# Patient Record
Sex: MALE | Race: WHITE | NOT HISPANIC OR LATINO
[De-identification: names, ages, dates, MRNs, and addresses within clinical notes are randomized per-mention and may not be internally consistent; named-entity substitution may affect disease eponyms.]

---

## 2017-09-07 PROBLEM — Z00.00 ENCOUNTER FOR PREVENTIVE HEALTH EXAMINATION: Status: ACTIVE | Noted: 2017-09-07

## 2017-09-12 ENCOUNTER — APPOINTMENT (OUTPATIENT)
Dept: OTOLARYNGOLOGY | Facility: CLINIC | Age: 62
End: 2017-09-12
Payer: MEDICAID

## 2017-09-12 VITALS — WEIGHT: 185 LBS | HEIGHT: 66 IN | BODY MASS INDEX: 29.73 KG/M2

## 2017-09-12 VITALS — SYSTOLIC BLOOD PRESSURE: 142 MMHG | DIASTOLIC BLOOD PRESSURE: 88 MMHG | HEART RATE: 65 BPM | OXYGEN SATURATION: 96 %

## 2017-09-12 DIAGNOSIS — Z78.9 OTHER SPECIFIED HEALTH STATUS: ICD-10-CM

## 2017-09-12 PROCEDURE — 99204 OFFICE O/P NEW MOD 45 MIN: CPT | Mod: 25

## 2017-09-12 PROCEDURE — 69210 REMOVE IMPACTED EAR WAX UNI: CPT | Mod: RT

## 2017-09-14 ENCOUNTER — MOBILE ON CALL (OUTPATIENT)
Age: 62
End: 2017-09-14

## 2017-09-26 ENCOUNTER — APPOINTMENT (OUTPATIENT)
Dept: OTOLARYNGOLOGY | Facility: CLINIC | Age: 62
End: 2017-09-26
Payer: MEDICAID

## 2017-09-26 VITALS — OXYGEN SATURATION: 96 % | HEART RATE: 81 BPM | SYSTOLIC BLOOD PRESSURE: 135 MMHG | DIASTOLIC BLOOD PRESSURE: 85 MMHG

## 2017-09-26 DIAGNOSIS — H61.21 IMPACTED CERUMEN, RIGHT EAR: ICD-10-CM

## 2017-09-26 PROCEDURE — 99214 OFFICE O/P EST MOD 30 MIN: CPT

## 2017-10-27 ENCOUNTER — OUTPATIENT (OUTPATIENT)
Dept: OUTPATIENT SERVICES | Facility: HOSPITAL | Age: 62
LOS: 1 days | End: 2017-10-27
Payer: MEDICAID

## 2017-10-27 PROCEDURE — 74230 X-RAY XM SWLNG FUNCJ C+: CPT

## 2017-10-27 PROCEDURE — 74230 X-RAY XM SWLNG FUNCJ C+: CPT | Mod: 26

## 2017-12-07 ENCOUNTER — APPOINTMENT (OUTPATIENT)
Dept: GASTROENTEROLOGY | Facility: CLINIC | Age: 62
End: 2017-12-07
Payer: MEDICAID

## 2017-12-07 VITALS
BODY MASS INDEX: 24.21 KG/M2 | RESPIRATION RATE: 14 BRPM | WEIGHT: 150 LBS | SYSTOLIC BLOOD PRESSURE: 132 MMHG | HEART RATE: 71 BPM | DIASTOLIC BLOOD PRESSURE: 85 MMHG | OXYGEN SATURATION: 98 %

## 2017-12-07 DIAGNOSIS — Z86.73 PERSONAL HISTORY OF TRANSIENT ISCHEMIC ATTACK (TIA), AND CEREBRAL INFARCTION W/OUT RESIDUAL DEFICITS: ICD-10-CM

## 2017-12-07 PROCEDURE — 99244 OFF/OP CNSLTJ NEW/EST MOD 40: CPT

## 2018-01-02 ENCOUNTER — APPOINTMENT (OUTPATIENT)
Dept: GASTROENTEROLOGY | Facility: HOSPITAL | Age: 63
End: 2018-01-02
Payer: MEDICAID

## 2018-01-02 ENCOUNTER — OUTPATIENT (OUTPATIENT)
Dept: OUTPATIENT SERVICES | Facility: HOSPITAL | Age: 63
LOS: 1 days | Discharge: HOME | End: 2018-01-02
Payer: MEDICAID

## 2018-01-02 PROCEDURE — 91010 ESOPHAGUS MOTILITY STUDY: CPT | Mod: 26

## 2018-01-02 PROCEDURE — 91010 ESOPHAGUS MOTILITY STUDY: CPT

## 2018-01-04 ENCOUNTER — APPOINTMENT (OUTPATIENT)
Dept: NEUROLOGY | Facility: CLINIC | Age: 63
End: 2018-01-04
Payer: MEDICAID

## 2018-01-04 VITALS
HEART RATE: 69 BPM | SYSTOLIC BLOOD PRESSURE: 146 MMHG | BODY MASS INDEX: 22.66 KG/M2 | HEIGHT: 66 IN | DIASTOLIC BLOOD PRESSURE: 87 MMHG | OXYGEN SATURATION: 100 % | WEIGHT: 141 LBS

## 2018-01-04 DIAGNOSIS — Z87.891 PERSONAL HISTORY OF NICOTINE DEPENDENCE: ICD-10-CM

## 2018-01-04 PROCEDURE — 99205 OFFICE O/P NEW HI 60 MIN: CPT

## 2018-01-04 RX ORDER — OFLOXACIN OTIC 3 MG/ML
0.3 SOLUTION AURICULAR (OTIC)
Qty: 1 | Refills: 0 | Status: DISCONTINUED | COMMUNITY
Start: 2017-09-12 | End: 2018-01-04

## 2018-01-04 RX ORDER — AMLODIPINE BESYLATE 5 MG/1
5 TABLET ORAL DAILY
Refills: 0 | Status: ACTIVE | COMMUNITY

## 2018-01-04 RX ORDER — INSULIN ASPART 100 [IU]/ML
INJECTION, SOLUTION INTRAVENOUS; SUBCUTANEOUS
Refills: 0 | Status: DISCONTINUED | COMMUNITY
End: 2018-01-04

## 2018-01-04 RX ORDER — ATORVASTATIN CALCIUM 80 MG/1
80 TABLET, FILM COATED ORAL DAILY
Refills: 0 | Status: ACTIVE | COMMUNITY

## 2018-01-04 RX ORDER — CIPROFLOXACIN 3 MG/ML
0.3 SOLUTION OPHTHALMIC
Qty: 1 | Refills: 0 | Status: DISCONTINUED | COMMUNITY
Start: 2017-09-14 | End: 2018-01-04

## 2018-01-04 RX ORDER — AMLODIPINE BESYLATE 5 MG/1
TABLET ORAL
Refills: 0 | Status: DISCONTINUED | COMMUNITY
End: 2018-01-04

## 2018-01-08 ENCOUNTER — MOBILE ON CALL (OUTPATIENT)
Age: 63
End: 2018-01-08

## 2018-01-18 ENCOUNTER — APPOINTMENT (OUTPATIENT)
Dept: GASTROENTEROLOGY | Facility: CLINIC | Age: 63
End: 2018-01-18
Payer: MEDICAID

## 2018-01-18 VITALS
HEART RATE: 92 BPM | SYSTOLIC BLOOD PRESSURE: 130 MMHG | BODY MASS INDEX: 23.24 KG/M2 | OXYGEN SATURATION: 99 % | WEIGHT: 144 LBS | TEMPERATURE: 99.2 F | DIASTOLIC BLOOD PRESSURE: 80 MMHG

## 2018-01-18 DIAGNOSIS — Z80.41 FAMILY HISTORY OF MALIGNANT NEOPLASM OF OVARY: ICD-10-CM

## 2018-01-18 DIAGNOSIS — Z86.39 PERSONAL HISTORY OF OTHER ENDOCRINE, NUTRITIONAL AND METABOLIC DISEASE: ICD-10-CM

## 2018-01-18 DIAGNOSIS — Z80.3 FAMILY HISTORY OF MALIGNANT NEOPLASM OF BREAST: ICD-10-CM

## 2018-01-18 PROCEDURE — 99213 OFFICE O/P EST LOW 20 MIN: CPT

## 2018-01-19 ENCOUNTER — INPATIENT (INPATIENT)
Facility: HOSPITAL | Age: 63
LOS: 0 days | Discharge: HOME | DRG: 639 | End: 2018-01-20
Attending: INTERNAL MEDICINE | Admitting: INTERNAL MEDICINE
Payer: MEDICAID

## 2018-01-19 VITALS
RESPIRATION RATE: 20 BRPM | HEART RATE: 96 BPM | OXYGEN SATURATION: 96 % | TEMPERATURE: 101 F | SYSTOLIC BLOOD PRESSURE: 120 MMHG | DIASTOLIC BLOOD PRESSURE: 76 MMHG

## 2018-01-19 DIAGNOSIS — E16.2 HYPOGLYCEMIA, UNSPECIFIED: ICD-10-CM

## 2018-01-19 DIAGNOSIS — Z29.9 ENCOUNTER FOR PROPHYLACTIC MEASURES, UNSPECIFIED: ICD-10-CM

## 2018-01-19 DIAGNOSIS — E11.9 TYPE 2 DIABETES MELLITUS WITHOUT COMPLICATIONS: ICD-10-CM

## 2018-01-19 DIAGNOSIS — E78.5 HYPERLIPIDEMIA, UNSPECIFIED: ICD-10-CM

## 2018-01-19 DIAGNOSIS — K22.0 ACHALASIA OF CARDIA: ICD-10-CM

## 2018-01-19 DIAGNOSIS — A41.9 SEPSIS, UNSPECIFIED ORGANISM: ICD-10-CM

## 2018-01-19 DIAGNOSIS — I63.9 CEREBRAL INFARCTION, UNSPECIFIED: ICD-10-CM

## 2018-01-19 DIAGNOSIS — J10.1 INFLUENZA DUE TO OTHER IDENTIFIED INFLUENZA VIRUS WITH OTHER RESPIRATORY MANIFESTATIONS: ICD-10-CM

## 2018-01-19 DIAGNOSIS — I10 ESSENTIAL (PRIMARY) HYPERTENSION: ICD-10-CM

## 2018-01-19 LAB
ALBUMIN SERPL ELPH-MCNC: 3.9 G/DL — SIGNIFICANT CHANGE UP (ref 3.3–5)
ALP SERPL-CCNC: 53 U/L — SIGNIFICANT CHANGE UP (ref 40–120)
ALT FLD-CCNC: 10 U/L — SIGNIFICANT CHANGE UP (ref 10–45)
ANION GAP SERPL CALC-SCNC: 10 MMOL/L — SIGNIFICANT CHANGE UP (ref 5–17)
APPEARANCE UR: CLEAR — SIGNIFICANT CHANGE UP
AST SERPL-CCNC: 20 U/L — SIGNIFICANT CHANGE UP (ref 10–40)
BASE EXCESS BLDV CALC-SCNC: -9.4 MMOL/L — SIGNIFICANT CHANGE UP
BASOPHILS NFR BLD AUTO: 0.2 % — SIGNIFICANT CHANGE UP (ref 0–2)
BILIRUB SERPL-MCNC: 0.4 MG/DL — SIGNIFICANT CHANGE UP (ref 0.2–1.2)
BILIRUB UR-MCNC: NEGATIVE — SIGNIFICANT CHANGE UP
BUN SERPL-MCNC: 19 MG/DL — SIGNIFICANT CHANGE UP (ref 7–23)
CALCIUM SERPL-MCNC: 8.7 MG/DL — SIGNIFICANT CHANGE UP (ref 8.4–10.5)
CHLORIDE SERPL-SCNC: 98 MMOL/L — SIGNIFICANT CHANGE UP (ref 96–108)
CO2 SERPL-SCNC: 31 MMOL/L — SIGNIFICANT CHANGE UP (ref 22–31)
COLOR SPEC: YELLOW — SIGNIFICANT CHANGE UP
CREAT SERPL-MCNC: 0.63 MG/DL — SIGNIFICANT CHANGE UP (ref 0.5–1.3)
DIFF PNL FLD: NEGATIVE — SIGNIFICANT CHANGE UP
EOSINOPHIL NFR BLD AUTO: 0.1 % — SIGNIFICANT CHANGE UP (ref 0–6)
FLUAV H1 2009 PAND RNA SPEC QL NAA+PROBE: DETECTED
GAS PNL BLDV: SIGNIFICANT CHANGE UP
GLUCOSE BLDC GLUCOMTR-MCNC: 102 MG/DL — HIGH (ref 70–99)
GLUCOSE BLDC GLUCOMTR-MCNC: 61 MG/DL — LOW (ref 70–99)
GLUCOSE SERPL-MCNC: 61 MG/DL — LOW (ref 70–99)
GLUCOSE UR QL: NEGATIVE — SIGNIFICANT CHANGE UP
HCO3 BLDV-SCNC: 14 MMOL/L — LOW (ref 20–27)
HCT VFR BLD CALC: 35.6 % — LOW (ref 39–50)
HGB BLD-MCNC: 11.9 G/DL — LOW (ref 13–17)
KETONES UR-MCNC: (no result) MG/DL
LACTATE SERPL-SCNC: 0.8 MMOL/L — SIGNIFICANT CHANGE UP (ref 0.5–2)
LEUKOCYTE ESTERASE UR-ACNC: NEGATIVE — SIGNIFICANT CHANGE UP
LYMPHOCYTES # BLD AUTO: 8.4 % — LOW (ref 13–44)
MCHC RBC-ENTMCNC: 31.1 PG — SIGNIFICANT CHANGE UP (ref 27–34)
MCHC RBC-ENTMCNC: 33.4 G/DL — SIGNIFICANT CHANGE UP (ref 32–36)
MCV RBC AUTO: 93 FL — SIGNIFICANT CHANGE UP (ref 80–100)
MONOCYTES NFR BLD AUTO: 6.1 % — SIGNIFICANT CHANGE UP (ref 2–14)
NEUTROPHILS NFR BLD AUTO: 85.2 % — HIGH (ref 43–77)
NITRITE UR-MCNC: NEGATIVE — SIGNIFICANT CHANGE UP
PCO2 BLDV: 21 MMHG — LOW (ref 41–51)
PH BLDV: 7.44 — HIGH (ref 7.32–7.43)
PH UR: 6 — SIGNIFICANT CHANGE UP (ref 5–8)
PLATELET # BLD AUTO: 238 K/UL — SIGNIFICANT CHANGE UP (ref 150–400)
PO2 BLDV: 58 MMHG — SIGNIFICANT CHANGE UP
POTASSIUM SERPL-MCNC: 3.8 MMOL/L — SIGNIFICANT CHANGE UP (ref 3.5–5.3)
POTASSIUM SERPL-SCNC: 3.8 MMOL/L — SIGNIFICANT CHANGE UP (ref 3.5–5.3)
PROT SERPL-MCNC: 6.5 G/DL — SIGNIFICANT CHANGE UP (ref 6–8.3)
PROT UR-MCNC: NEGATIVE MG/DL — SIGNIFICANT CHANGE UP
RAPID RVP RESULT: DETECTED
RBC # BLD: 3.83 M/UL — LOW (ref 4.2–5.8)
RBC # FLD: 14.8 % — SIGNIFICANT CHANGE UP (ref 10.3–16.9)
SAO2 % BLDV: 89 % — SIGNIFICANT CHANGE UP
SODIUM SERPL-SCNC: 139 MMOL/L — SIGNIFICANT CHANGE UP (ref 135–145)
SP GR SPEC: 1.01 — SIGNIFICANT CHANGE UP (ref 1–1.03)
UROBILINOGEN FLD QL: 0.2 E.U./DL — SIGNIFICANT CHANGE UP
WBC # BLD: 9.4 K/UL — SIGNIFICANT CHANGE UP (ref 3.8–10.5)
WBC # FLD AUTO: 9.4 K/UL — SIGNIFICANT CHANGE UP (ref 3.8–10.5)

## 2018-01-19 PROCEDURE — 93010 ELECTROCARDIOGRAM REPORT: CPT

## 2018-01-19 PROCEDURE — 99285 EMERGENCY DEPT VISIT HI MDM: CPT | Mod: 25

## 2018-01-19 PROCEDURE — 99223 1ST HOSP IP/OBS HIGH 75: CPT | Mod: GC

## 2018-01-19 PROCEDURE — 71045 X-RAY EXAM CHEST 1 VIEW: CPT | Mod: 26

## 2018-01-19 RX ORDER — SODIUM CHLORIDE 9 MG/ML
1000 INJECTION, SOLUTION INTRAVENOUS
Qty: 0 | Refills: 0 | Status: DISCONTINUED | OUTPATIENT
Start: 2018-01-19 | End: 2018-01-20

## 2018-01-19 RX ORDER — INSULIN LISPRO 100/ML
VIAL (ML) SUBCUTANEOUS
Qty: 0 | Refills: 0 | Status: DISCONTINUED | OUTPATIENT
Start: 2018-01-19 | End: 2018-01-20

## 2018-01-19 RX ORDER — ATORVASTATIN CALCIUM 80 MG/1
80 TABLET, FILM COATED ORAL AT BEDTIME
Qty: 0 | Refills: 0 | Status: DISCONTINUED | OUTPATIENT
Start: 2018-01-19 | End: 2018-01-20

## 2018-01-19 RX ORDER — SODIUM CHLORIDE 9 MG/ML
1000 INJECTION INTRAMUSCULAR; INTRAVENOUS; SUBCUTANEOUS ONCE
Qty: 0 | Refills: 0 | Status: COMPLETED | OUTPATIENT
Start: 2018-01-19 | End: 2018-01-19

## 2018-01-19 RX ORDER — DEXTROSE 50 % IN WATER 50 %
50 SYRINGE (ML) INTRAVENOUS ONCE
Qty: 0 | Refills: 0 | Status: COMPLETED | OUTPATIENT
Start: 2018-01-19 | End: 2018-01-19

## 2018-01-19 RX ORDER — HEPARIN SODIUM 5000 [USP'U]/ML
5000 INJECTION INTRAVENOUS; SUBCUTANEOUS EVERY 8 HOURS
Qty: 0 | Refills: 0 | Status: DISCONTINUED | OUTPATIENT
Start: 2018-01-19 | End: 2018-01-20

## 2018-01-19 RX ORDER — SODIUM CHLORIDE 9 MG/ML
3 INJECTION INTRAMUSCULAR; INTRAVENOUS; SUBCUTANEOUS ONCE
Qty: 0 | Refills: 0 | Status: COMPLETED | OUTPATIENT
Start: 2018-01-19 | End: 2018-01-19

## 2018-01-19 RX ORDER — DEXTROSE 50 % IN WATER 50 %
25 SYRINGE (ML) INTRAVENOUS ONCE
Qty: 0 | Refills: 0 | Status: COMPLETED | OUTPATIENT
Start: 2018-01-19 | End: 2018-01-19

## 2018-01-19 RX ORDER — DEXTROSE 50 % IN WATER 50 %
1 SYRINGE (ML) INTRAVENOUS ONCE
Qty: 0 | Refills: 0 | Status: DISCONTINUED | OUTPATIENT
Start: 2018-01-19 | End: 2018-01-20

## 2018-01-19 RX ORDER — ACETAMINOPHEN 500 MG
650 TABLET ORAL ONCE
Qty: 0 | Refills: 0 | Status: DISCONTINUED | OUTPATIENT
Start: 2018-01-19 | End: 2018-01-19

## 2018-01-19 RX ORDER — ACETAMINOPHEN 500 MG
650 TABLET ORAL ONCE
Qty: 0 | Refills: 0 | Status: COMPLETED | OUTPATIENT
Start: 2018-01-19 | End: 2018-01-19

## 2018-01-19 RX ORDER — ASPIRIN/CALCIUM CARB/MAGNESIUM 324 MG
81 TABLET ORAL DAILY
Qty: 0 | Refills: 0 | Status: DISCONTINUED | OUTPATIENT
Start: 2018-01-19 | End: 2018-01-20

## 2018-01-19 RX ORDER — DEXTROSE 50 % IN WATER 50 %
12.5 SYRINGE (ML) INTRAVENOUS ONCE
Qty: 0 | Refills: 0 | Status: DISCONTINUED | OUTPATIENT
Start: 2018-01-19 | End: 2018-01-20

## 2018-01-19 RX ORDER — DEXTROSE 50 % IN WATER 50 %
25 SYRINGE (ML) INTRAVENOUS ONCE
Qty: 0 | Refills: 0 | Status: DISCONTINUED | OUTPATIENT
Start: 2018-01-19 | End: 2018-01-20

## 2018-01-19 RX ORDER — GLUCAGON INJECTION, SOLUTION 0.5 MG/.1ML
1 INJECTION, SOLUTION SUBCUTANEOUS ONCE
Qty: 0 | Refills: 0 | Status: DISCONTINUED | OUTPATIENT
Start: 2018-01-19 | End: 2018-01-20

## 2018-01-19 RX ORDER — INFLUENZA VIRUS VACCINE 15; 15; 15; 15 UG/.5ML; UG/.5ML; UG/.5ML; UG/.5ML
0.5 SUSPENSION INTRAMUSCULAR ONCE
Qty: 0 | Refills: 0 | Status: COMPLETED | OUTPATIENT
Start: 2018-01-19 | End: 2018-01-19

## 2018-01-19 RX ADMIN — Medication 50 MILLILITER(S): at 17:44

## 2018-01-19 RX ADMIN — Medication 75 MILLIGRAM(S): at 20:14

## 2018-01-19 RX ADMIN — SODIUM CHLORIDE 125 MILLILITER(S): 9 INJECTION, SOLUTION INTRAVENOUS at 22:33

## 2018-01-19 RX ADMIN — SODIUM CHLORIDE 3 MILLILITER(S): 9 INJECTION INTRAMUSCULAR; INTRAVENOUS; SUBCUTANEOUS at 17:25

## 2018-01-19 RX ADMIN — SODIUM CHLORIDE 1000 MILLILITER(S): 9 INJECTION INTRAMUSCULAR; INTRAVENOUS; SUBCUTANEOUS at 17:40

## 2018-01-19 RX ADMIN — Medication 650 MILLIGRAM(S): at 18:54

## 2018-01-19 RX ADMIN — Medication 650 MILLIGRAM(S): at 20:25

## 2018-01-19 RX ADMIN — SODIUM CHLORIDE 125 MILLILITER(S): 9 INJECTION, SOLUTION INTRAVENOUS at 18:12

## 2018-01-19 RX ADMIN — SODIUM CHLORIDE 2000 MILLILITER(S): 9 INJECTION INTRAMUSCULAR; INTRAVENOUS; SUBCUTANEOUS at 22:32

## 2018-01-19 RX ADMIN — ATORVASTATIN CALCIUM 80 MILLIGRAM(S): 80 TABLET, FILM COATED ORAL at 22:32

## 2018-01-19 RX ADMIN — HEPARIN SODIUM 5000 UNIT(S): 5000 INJECTION INTRAVENOUS; SUBCUTANEOUS at 22:32

## 2018-01-19 NOTE — ED ADULT NURSE NOTE - OBJECTIVE STATEMENT
Pt BIBA for hypoglycemia. Pt currently has no complaints. Pt FS was 35, given 1 amp D50 by EMS, FS rechecked currently 89. PT stated he took lantus last night. Pt has a PEG tube in place, NPO. Wife at bedside.

## 2018-01-19 NOTE — H&P ADULT - PROBLEM SELECTOR PLAN 4
happened in 5/2017; residual defcit includes dysphagia   -- c/w ASA   -- c/w lipiitor   -- check Lipid profile in am Occurred 5/2017; residual deficits includes dysphagia; follows up with Dr pereira  -- c/w ASA   -- c/w lipiitor   -- check Lipid profile in am

## 2018-01-19 NOTE — ED PROVIDER NOTE - OBJECTIVE STATEMENT
Pt w/ PMHx HTN, DM (states on Lantus 26 units QHS only), CVA s/ residual dysphagia w/ PEG and tube feeds May 2017, p/w hypoglycemia. Pt c/o dizziness while at [employer]. Wife told pt to come home. Pt noted to be off. Wife checked temp, and at that time, temp 98.3 at 11 am. Pt was noted to be sluggish. EMS called. On EMS arrival BS 35. Pt was given 1 amp of D50. Also noted to have temp 101 at that time. Pt reports cough. Pt reports chronic sputum production because of inability to swallow, and frequently spits up beige-colored sputum, unchanged. No rhinorrhea, nor congestion. No V/D. No urinary complaints. No change in bowel habits. No CP, SOB, or abdominal pain. Pt reports his tube feeds are with Glucerna 1.5, approx 5 cans per day. Pt reports he thinks he had 2-3 cans today. Pt was previously on oral hypoglycemics, but states he no longer is. Wife reports since CVA May 2017 pt was lost significant weight 230 lbs to 140 lbs. Wife thinks pt was supposed to decrease his lantus dose, but pt states this is not so. Pt reports his PCP is Dr Rosenberg, ? GI

## 2018-01-19 NOTE — H&P ADULT - NSHPPHYSICALEXAM_GEN_ALL_CORE
.  VITAL SIGNS:  T(C): 38.4 (01-19-18 @ 17:24), Max: 38.4 (01-19-18 @ 17:24)  T(F): 101.1 (01-19-18 @ 17:24), Max: 101.1 (01-19-18 @ 17:24)  HR: 96 (01-19-18 @ 16:44) (96 - 96)  BP: 120/76 (01-19-18 @ 16:44) (120/76 - 120/76)  BP(mean): --  RR: 20 (01-19-18 @ 16:44) (20 - 20)  SpO2: 96% (01-19-18 @ 16:44) (96% - 96%)  Wt(kg): --    PHYSICAL EXAM:    Constitutional: WDWN resting comfortably in bed; NAD  Head: NC/AT  Eyes: PERRL, EOMI, clear conjunctiva  ENT: no nasal discharge; uvula midline, no oropharyngeal erythema or exudates; MMM  Neck: supple; no JVD or thyromegaly  Respiratory: CTA B/L; no W/R/R, no retractions  Cardiac: +S1/S2; RRR; no M/R/G; PMI non-displaced  Gastrointestinal: soft, NT/ND; no rebound or guarding; +BSx4  Genitourinary: normal external genitalia  Back: spine midline, no bony tenderness or step-offs; no CVAT B/L  Extremities: WWP, no clubbing or cyanosis; no peripheral edema  Musculoskeletal: NROM x4; no joint swelling, tenderness or erythema  Vascular: 2+ radial, femoral, DP/PT pulses B/L  Dermatologic: skin warm, dry and intact; no rashes, wounds, or scars  Lymphatic: no submandibular or cervical LAD  Neurologic: AAOx3; CNII-XII grossly intact; no focal deficits  Psychiatric: affect and characteristics of appearance, verbalizations, behaviors are appropriate .  VITAL SIGNS:  T(C): 38.4 (01-19-18 @ 17:24), Max: 38.4 (01-19-18 @ 17:24)  T(F): 101.1 (01-19-18 @ 17:24), Max: 101.1 (01-19-18 @ 17:24)  HR: 96 (01-19-18 @ 16:44) (96 - 96)  BP: 120/76 (01-19-18 @ 16:44) (120/76 - 120/76)  BP(mean): --  RR: 20 (01-19-18 @ 16:44) (20 - 20)  SpO2: 96% (01-19-18 @ 16:44) (96% - 96%)  Wt(kg): --    PHYSICAL EXAM:    Constitutional: WDWN resting comfortably in bed; NAD  Head: NC/AT  Eyes: PERRL, EOMI, clear conjunctiva  ENT: no nasal discharge; uvula midline, no oropharyngeal erythema or exudates;   Neck: supple;   Respiratory: CTA B/L; no W/R/R, no retractions  Cardiac: +S1/S2; RRR; no M/R/G;   Gastrointestinal: mild umblical tenderness to deep palpation; +PEG tube in place; soft, NT/ND; no rebound or guarding;   Extremities: WWP, no clubbing or cyanosis; no peripheral edema  Musculoskeletal: NROM x4;   Vascular: 2+ radial and DP pulses B/L  Dermatologic: skin vewarm, dry and intact; no rashes, wounds, or scars  Lymphatic: no submandibular or cervical LAD  Neurologic: AAOx3; CNII-XII grossly intact; no focal deficits  Psychiatric: affect and characteristics of appearance, verbalizations, behaviors are appropriate .  VITAL SIGNS:  T(C): 38.4 (01-19-18 @ 17:24), Max: 38.4 (01-19-18 @ 17:24)  T(F): 101.1 (01-19-18 @ 17:24), Max: 101.1 (01-19-18 @ 17:24)  HR: 96 (01-19-18 @ 16:44) (96 - 96)  BP: 120/76 (01-19-18 @ 16:44) (120/76 - 120/76)  BP(mean): --  RR: 20 (01-19-18 @ 16:44) (20 - 20)  SpO2: 96% (01-19-18 @ 16:44) (96% - 96%)  Wt(kg): --    PHYSICAL EXAM:    Constitutional: WDWN resting comfortably in bed; NAD  Head: NC/AT  Eyes: PERRL, EOMI, clear conjunctiva  ENT: no nasal discharge; uvula midline, no oropharyngeal erythema or exudates;   Neck: supple;   Respiratory: CTA B/L; no W/R/R, no retractions  Cardiac: +S1/S2; RRR; no M/R/G;   Gastrointestinal: mild umbilical tenderness to deep palpation; +PEG tube in place; soft, NT/ND; no rebound or guarding;   Extremities: WWP, no clubbing or cyanosis; no peripheral edema  Musculoskeletal: NROM x4;   Vascular: 2+ radial and DP pulses B/L  Dermatologic: skin vewarm, dry and intact; no rashes, wounds, or scars  Lymphatic: no submandibular or cervical LAD  Neurologic: AAOx3; CNII-XII grossly intact; no focal deficits  Psychiatric: affect and characteristics of appearance, verbalizations, behaviors are appropriate

## 2018-01-19 NOTE — ED PROVIDER NOTE - GASTROINTESTINAL, MLM
Abd soft, NT, ND, NABS. No guarding, rebound, or rigidity. No pulsatile abdominal masses. No organomegaly appreciated. PEG present

## 2018-01-19 NOTE — H&P ADULT - PROBLEM SELECTOR PLAN 8
continue lipitor   -- check lipid profile in am Patient takes amlodipine 5 mg QD; stable   -- will continue

## 2018-01-19 NOTE — H&P ADULT - ASSESSMENT
Pt ia a 61 yo male w/ a  PMHx of HTN, T2DM (states on Lantus 26 units QHS only), CVA s/ residual dysphagia w/ PEG and tube feeds May 2017, who p/w with weakness and lethargy in am.

## 2018-01-19 NOTE — H&P ADULT - PROBLEM SELECTOR PLAN 10
F: D5 and NS @ 125 cc/hr for now  E: replete PRN  N: glucerna 1.5 romeo 4-5 cans a day  FULL CODE  Prophylactic measure: HEParin TID

## 2018-01-19 NOTE — H&P ADULT - PROBLEM SELECTOR PLAN 5
A1C of 6.1- patient is well controlled   -- DOES not Warrant lantus anymore- needs to be discontinued upon discharge Patient reports 90 pound weight loss since the stroke; may be secondary to dysphagia from stroke and poor nutrition ; No B symptoms reported by patient- however has extensive family hx of cancer as seen above   -- nutritionist consult  -- routine cancer surveillance as outpatient

## 2018-01-19 NOTE — H&P ADULT - ATTENDING COMMENTS
Pt seen and examined at bedside on 1/19/2017 @ 2350    Agree with HPI, ROS as above.     VS, Labs, FH, SH, allergies, medications, imaging reviewed. Agree with physical exam as above    A/P: Pt ia a 61 yo male w/ a  PMHx of HTN, T2DM (states on Lantus 26 units QHS only), CVA s/ residual dysphagia w/ PEG and tube feeds May 2017, who p/w with weakness and lethargy in am.    **Hypoglycemia  -Likely iatrogenic - 2/2 insulin use, given patient's report of weight loss since stroke but unchanged insulin as well as in the setting of infection (detailed below) patient has been persistently hypoglycemic likely contributing to his initial symptoms; HbA1C of 6.1 supports that patient has likely been unknowingly hypoglycemic at home for some time  -C/w D10 gtt - q2-4 FS, wean as appropriate - FS goal > 100  -Endocrinology consult in AM   -Hold insulin - patient will likely need a dramatic reduction in insulin dose or no insulin now    **Sepsis 2/2 Flu  -Meeting 2/4 SIRS criteria on admission with + flu  -C/w Tamiflu  -LA wnl  -f/u blood cultures    Plan otherwise as outlined above....

## 2018-01-19 NOTE — H&P ADULT - PROBLEM SELECTOR PLAN 6
[id number] A1C of 6.1- patient is well controlled   -- endo consult in am for med regiment   -- DOES not Warrant lantus anymore- needs to be discontinued upon discharge

## 2018-01-19 NOTE — H&P ADULT - PROBLEM SELECTOR PLAN 7
Patient takes amlodipine 5 mg QD; stable   -- will continue follows up with Dr Rodriguez as outpatient; was told that he has achalasia and that he will be scheduled for a myotomy to correct

## 2018-01-19 NOTE — H&P ADULT - HISTORY OF PRESENT ILLNESS
· HPI Objective Statement: Pt w/ PMHx HTN, DM (states on Lantus 26 units QHS only), CVA s/ residual dysphagia w/ PEG and tube feeds May 2017, p/w hypoglycemia. Pt c/o dizziness while at [employer]. Wife told pt to come home. Pt noted to be off. Wife checked temp, and at that time, temp 98.3 at 11 am. Pt was noted to be sluggish. EMS called. On EMS arrival BS 35. Pt was given 1 amp of D50. Also noted to have temp 101 at that time. Pt reports cough. Pt reports chronic sputum production because of inability to swallow, and frequently spits up beige-colored sputum, unchanged. No rhinorrhea, nor congestion. No V/D. No urinary complaints. No change in bowel habits. No CP, SOB, or abdominal pain. Pt reports his tube feeds are with Glucerna 1.5, approx 5 cans per day. Pt reports he thinks he had 2-3 cans today. Pt was previously on oral hypoglycemics, but states he no longer is. Wife reports since CVA May 2017 pt was lost significant weight 230 lbs to 140 lbs. Wife thinks pt was supposed to decrease his lantus dose, but pt states this is not so. Pt reports his PCP is Dr Rosenberg, ? GI · HPI Objective Statement: Pt ia a 61 yo male w/ a  PMHx of HTN, T2DM (states on Lantus 26 units QHS only), CVA s/ residual dysphagia w/ PEG and tube feeds May 2017, who p/w with weakness and lethargy in am. Patient reports he had just returned from [employer] at 11 am preparing shabbat dinner when his wife noticed he was wobbly and looked unwell. When asked,  Pt did c/o of some dizziness while at [employer]. Wife checked temp, and at that time, temp 98.3 at 11 am. Patient does report a chronic cough (Usually clear) that worsened with yellowish sputum today. Patient went to sleep and woke up at 3 pm and still felt weak. In the setting of previous hx of stroke, wife called EMS who upon arrival noted a  BS of 35. Pt was given 1 amp of D50. Also noted to have temp 101 at that time. ROS is negative for fever, chills, headache, rhinnorhea, CP, SOB, or abdominal pain.N/V/D/C or urinary complaints. Pt reports his tube feeds are with Glucerna 1.5, approx 5 cans per day. Pt reports he thinks he had 2-3 cans today.   Of note, Wife reports since CVA May 2017 pt has lost significant weight 230 lbs to 140 lbs.    In the ED, vitals were Max: 101.1 HR: 75 - 96BP: 117/72 - 120/76RR:18 - 20 SpO2: 96% - 97%. Patient in the ED was given tamiflu, 1 amp of glucose, tylenol

## 2018-01-19 NOTE — H&P ADULT - NSHPLABSRESULTS_GEN_ALL_CORE
.  LABS:                         11.9   9.4   )-----------( 238      ( 19 Jan 2018 17:30 )             35.6     01-19    139  |  98  |  19  ----------------------------<  61<L>  3.8   |  31  |  0.63    Ca    8.7      19 Jan 2018 17:30    TPro  6.5  /  Alb  3.9  /  TBili  0.4  /  DBili  x   /  AST  20  /  ALT  10  /  AlkPhos  53  01-19              Lactate, Blood: 0.8 mmoL/L (01-19 @ 17:30)      RADIOLOGY, EKG & ADDITIONAL TESTS: Reviewed. .  LABS:                         11.9   9.4   )-----------( 238      ( 19 Jan 2018 17:30 )             35.6     01-19    139  |  98  |  19  ----------------------------<  61<L>  3.8   |  31  |  0.63    Ca    8.7      19 Jan 2018 17:30    TPro  6.5  /  Alb  3.9  /  TBili  0.4  /  DBili  x   /  AST  20  /  ALT  10  /  AlkPhos  53  01-19              Lactate, Blood: 0.8 mmoL/L (01-19 @ 17:30)      RADIOLOGY, EKG & ADDITIONAL TESTS: CXR is clear

## 2018-01-19 NOTE — H&P ADULT - FAMILY HISTORY
Father  Still living? Unknown  Family history of pancreatic cancer, Age at diagnosis: Age Unknown     Mother  Still living? Unknown  Family history of malignant neoplasm of ovary, Age at diagnosis: Age Unknown     Sibling  Still living? Unknown  Family history of breast cancer, Age at diagnosis: Age Unknown

## 2018-01-19 NOTE — ED PROVIDER NOTE - MEDICAL DECISION MAKING DETAILS
Pt p/w hypoglycemia. Pt reports lantus only. Hypoglycemia. DDx includes continued oral hypoglycemics, lack of need for insulin given diet, infection, sepsis, other pathology. Check labs, CXR, UA, monitor FS.

## 2018-01-19 NOTE — H&P ADULT - PROBLEM SELECTOR PLAN 2
Patient with RVP positve for influenza AH3  -- will dispense TAMIFLU 75 BID Patient with RVP positive for influenza AH3  -- will dispense TAMIFLU 75 BID

## 2018-01-19 NOTE — ED ADULT NURSE NOTE - CHIEF COMPLAINT QUOTE
pt received to er with c/o hypoglycemia. As per EMS pt FS was 35 and was given an amp of d50 Fs now is 89. Pt was alert and oriented when picked up by EMS. A&Ox3 now. Pt has a peg tube and is NPO. 18 G RFA

## 2018-01-19 NOTE — ED PROVIDER NOTE - PROGRESS NOTE DETAILS
Repeat glucose 50. Will give d50, start d5ns After glucose reached 142, d5NS turned off, repeat glucose 102, D5NH resumed

## 2018-01-19 NOTE — ED ADULT TRIAGE NOTE - CHIEF COMPLAINT QUOTE
pt received to er with c/o hypoglycemia. As per EMS pt FS was 35 and was given an amp of d50 Fs now is 89. Pt was alert and oriented when picked up by EMS. A&Ox3 now. Pt has a peg tube and is NPO

## 2018-01-19 NOTE — H&P ADULT - PROBLEM SELECTOR PLAN 1
Reports weakness and dizinees Patient with FGS of 35 on EMS arrival given 1 amp of D50 and FGS upon arrival was 89 on admission; 1 hour later it was 50 and another 1 amp of D50 was given and patient responded with FGS of 125--->>142 but then 102 for which he was started on a D5 + NS at 125 cc/hr Reports weakness and dizziness Patient with FGS of 35 on EMS arrival given 1 amp of D50 and FGS upon arrival was 89 on admission; 1 hour later it was 50 and another 1 amp of D50 was given and patient responded with FGS of 125--->>142 but then 102 for which he was started on a D5 + NS at 125 cc/hr; A1c is 6.1 however patient has been on lantus 26 units QD; patient also endorses that he has lost 90 pounds since the stroke; Hypoglycemia may be iatrogenic from insulin in the setting of an infection (influenza) and recent weight loss   -- will change to D10+ NS drip at 125 cc/hr  --FGS Q6   -- endo consult in the am to determine an adequate regiment upon discharge  -- neuro check Q6

## 2018-01-20 ENCOUNTER — TRANSCRIPTION ENCOUNTER (OUTPATIENT)
Age: 63
End: 2018-01-20

## 2018-01-20 VITALS — WEIGHT: 140.21 LBS

## 2018-01-20 DIAGNOSIS — R63.4 ABNORMAL WEIGHT LOSS: ICD-10-CM

## 2018-01-20 LAB
ANION GAP SERPL CALC-SCNC: 9 MMOL/L — SIGNIFICANT CHANGE UP (ref 5–17)
BUN SERPL-MCNC: 12 MG/DL — SIGNIFICANT CHANGE UP (ref 7–23)
CALCIUM SERPL-MCNC: 7.9 MG/DL — LOW (ref 8.4–10.5)
CHLORIDE SERPL-SCNC: 102 MMOL/L — SIGNIFICANT CHANGE UP (ref 96–108)
CHOLEST SERPL-MCNC: 99 MG/DL — SIGNIFICANT CHANGE UP (ref 10–199)
CO2 SERPL-SCNC: 30 MMOL/L — SIGNIFICANT CHANGE UP (ref 22–31)
CREAT SERPL-MCNC: 0.56 MG/DL — SIGNIFICANT CHANGE UP (ref 0.5–1.3)
GLUCOSE BLDC GLUCOMTR-MCNC: 121 MG/DL — HIGH (ref 70–99)
GLUCOSE BLDC GLUCOMTR-MCNC: 127 MG/DL — HIGH (ref 70–99)
GLUCOSE BLDC GLUCOMTR-MCNC: 133 MG/DL — HIGH (ref 70–99)
GLUCOSE BLDC GLUCOMTR-MCNC: 169 MG/DL — HIGH (ref 70–99)
GLUCOSE BLDC GLUCOMTR-MCNC: 196 MG/DL — HIGH (ref 70–99)
GLUCOSE BLDC GLUCOMTR-MCNC: 83 MG/DL — SIGNIFICANT CHANGE UP (ref 70–99)
GLUCOSE SERPL-MCNC: 139 MG/DL — HIGH (ref 70–99)
HCT VFR BLD CALC: 36.1 % — LOW (ref 39–50)
HCV AB S/CO SERPL IA: 0.09 S/CO — SIGNIFICANT CHANGE UP
HCV AB SERPL-IMP: SIGNIFICANT CHANGE UP
HDLC SERPL-MCNC: 52 MG/DL — SIGNIFICANT CHANGE UP (ref 40–125)
HGB BLD-MCNC: 12.2 G/DL — LOW (ref 13–17)
LIPID PNL WITH DIRECT LDL SERPL: 36 MG/DL — SIGNIFICANT CHANGE UP
MAGNESIUM SERPL-MCNC: 1.9 MG/DL — SIGNIFICANT CHANGE UP (ref 1.6–2.6)
MCHC RBC-ENTMCNC: 31.5 PG — SIGNIFICANT CHANGE UP (ref 27–34)
MCHC RBC-ENTMCNC: 33.8 G/DL — SIGNIFICANT CHANGE UP (ref 32–36)
MCV RBC AUTO: 93.3 FL — SIGNIFICANT CHANGE UP (ref 80–100)
PHOSPHATE SERPL-MCNC: 2.8 MG/DL — SIGNIFICANT CHANGE UP (ref 2.5–4.5)
PLATELET # BLD AUTO: 231 K/UL — SIGNIFICANT CHANGE UP (ref 150–400)
POTASSIUM SERPL-MCNC: 3.9 MMOL/L — SIGNIFICANT CHANGE UP (ref 3.5–5.3)
POTASSIUM SERPL-SCNC: 3.9 MMOL/L — SIGNIFICANT CHANGE UP (ref 3.5–5.3)
RBC # BLD: 3.87 M/UL — LOW (ref 4.2–5.8)
RBC # FLD: 15.3 % — SIGNIFICANT CHANGE UP (ref 10.3–16.9)
SODIUM SERPL-SCNC: 141 MMOL/L — SIGNIFICANT CHANGE UP (ref 135–145)
TOTAL CHOLESTEROL/HDL RATIO MEASUREMENT: 1.9 RATIO — LOW (ref 3.4–9.6)
TRIGL SERPL-MCNC: 54 MG/DL — SIGNIFICANT CHANGE UP (ref 10–149)
WBC # BLD: 8.6 K/UL — SIGNIFICANT CHANGE UP (ref 3.8–10.5)
WBC # FLD AUTO: 8.6 K/UL — SIGNIFICANT CHANGE UP (ref 3.8–10.5)

## 2018-01-20 PROCEDURE — 99222 1ST HOSP IP/OBS MODERATE 55: CPT | Mod: GC

## 2018-01-20 PROCEDURE — 99239 HOSP IP/OBS DSCHRG MGMT >30: CPT

## 2018-01-20 RX ORDER — REPAGLINIDE 1 MG/1
1 TABLET ORAL
Qty: 90 | Refills: 0 | OUTPATIENT
Start: 2018-01-20 | End: 2018-02-18

## 2018-01-20 RX ORDER — SODIUM CHLORIDE 9 MG/ML
1000 INJECTION, SOLUTION INTRAVENOUS
Qty: 0 | Refills: 0 | Status: DISCONTINUED | OUTPATIENT
Start: 2018-01-20 | End: 2018-01-20

## 2018-01-20 RX ORDER — INSULIN GLARGINE 100 [IU]/ML
0 INJECTION, SOLUTION SUBCUTANEOUS
Qty: 0 | Refills: 0 | COMMUNITY

## 2018-01-20 RX ORDER — REPAGLINIDE 1 MG/1
1 TABLET ORAL
Qty: 0 | Refills: 0 | COMMUNITY
Start: 2018-01-20

## 2018-01-20 RX ORDER — INSULIN GLARGINE 100 [IU]/ML
10 INJECTION, SOLUTION SUBCUTANEOUS AT BEDTIME
Qty: 0 | Refills: 0 | Status: DISCONTINUED | OUTPATIENT
Start: 2018-01-20 | End: 2018-01-20

## 2018-01-20 RX ORDER — DEXTROSE 50 % IN WATER 50 %
50 SYRINGE (ML) INTRAVENOUS ONCE
Qty: 0 | Refills: 0 | Status: COMPLETED | OUTPATIENT
Start: 2018-01-20 | End: 2018-01-20

## 2018-01-20 RX ORDER — REPAGLINIDE 1 MG/1
1 TABLET ORAL
Qty: 0 | Refills: 0 | Status: DISCONTINUED | OUTPATIENT
Start: 2018-01-20 | End: 2018-01-20

## 2018-01-20 RX ORDER — ATORVASTATIN CALCIUM 80 MG/1
1 TABLET, FILM COATED ORAL
Qty: 30 | Refills: 0 | OUTPATIENT
Start: 2018-01-20 | End: 2018-02-18

## 2018-01-20 RX ORDER — ASPIRIN/CALCIUM CARB/MAGNESIUM 324 MG
81 TABLET ORAL DAILY
Qty: 0 | Refills: 0 | Status: DISCONTINUED | OUTPATIENT
Start: 2018-01-20 | End: 2018-01-20

## 2018-01-20 RX ADMIN — Medication 50 MILLILITER(S): at 00:20

## 2018-01-20 RX ADMIN — SODIUM CHLORIDE 125 MILLILITER(S): 9 INJECTION, SOLUTION INTRAVENOUS at 03:43

## 2018-01-20 RX ADMIN — Medication 81 MILLIGRAM(S): at 11:02

## 2018-01-20 RX ADMIN — REPAGLINIDE 1 MILLIGRAM(S): 1 TABLET ORAL at 12:35

## 2018-01-20 RX ADMIN — HEPARIN SODIUM 5000 UNIT(S): 5000 INJECTION INTRAVENOUS; SUBCUTANEOUS at 13:33

## 2018-01-20 RX ADMIN — REPAGLINIDE 1 MILLIGRAM(S): 1 TABLET ORAL at 17:59

## 2018-01-20 RX ADMIN — HEPARIN SODIUM 5000 UNIT(S): 5000 INJECTION INTRAVENOUS; SUBCUTANEOUS at 05:36

## 2018-01-20 RX ADMIN — Medication 75 MILLIGRAM(S): at 11:02

## 2018-01-20 NOTE — DIETITIAN INITIAL EVALUATION ADULT. - PROBLEM SELECTOR PLAN 7
follows up with Dr Rodriguez as outpatient; was told that he has achalasia and that he will be scheduled for a myotomy to correct

## 2018-01-20 NOTE — CONSULT NOTE ADULT - SUBJECTIVE AND OBJECTIVE BOX
HPI: 62yMale    Age at Dx:  How dx:  Hx and duration of insulin:  Current Therapy:  Hx of hypoglycemia  Hx of DKA/HHS?    Home FSG:  Fasting  Lunch  Dinner  Bed    Hx of other regimens  Complications:  Outpatient Endo:    PMH & Surgical Hx:HYPOGLYCEMIA; INFLUENZAA  No h/o HF  Unknown h/o HF  Family history of breast cancer (Sibling)  Family history of malignant neoplasm of ovary (Mother)  Family history of pancreatic cancer (Father)  Handoff  MEWS Score  HLD (hyperlipidemia)  Stroke  Diabetes  HTN (hypertension)  Hypoglycemia  Weight loss  Prophylactic measure  HLD (hyperlipidemia)  HTN (hypertension)  Achalasia  Diabetes  Stroke  Sepsis  Influenza A  Hypoglycemia  No significant past surgical history  LOWER SUGAR  90+  Influenza A      FH:  DM:  Thyroid:  Autoimmune:  Other:    SH:  Smoking  Etoh:  Recreational Drugs:  Social Life:    Current Meds:  aspirin  chewable 81 milliGRAM(s) Enteral Tube daily  atorvastatin 80 milliGRAM(s) Oral at bedtime  dextrose 5%. 1000 milliLiter(s) IV Continuous <Continuous>  dextrose 50% Injectable 12.5 Gram(s) IV Push once  dextrose 50% Injectable 25 Gram(s) IV Push once  dextrose 50% Injectable 25 Gram(s) IV Push once  dextrose Gel 1 Dose(s) Oral once PRN  glucagon  Injectable 1 milliGRAM(s) IntraMuscular once PRN  heparin  Injectable 5000 Unit(s) SubCutaneous every 8 hours  influenza   Vaccine 0.5 milliLiter(s) IntraMuscular once  insulin glargine Injectable (LANTUS) 10 Unit(s) SubCutaneous at bedtime  insulin lispro (HumaLOG) corrective regimen sliding scale   SubCutaneous Before meals and at bedtime  oseltamivir Suspension 75 milliGRAM(s) Enteral Tube every 12 hours  repaglinide 1 milliGRAM(s) Oral three times a day before meals      Allergies:  penicillin (Unknown)      ROS:  Denies the following except as indicated.    General: weight loss/weight gain, decreased appetite, fatigue  Eyes: Blurry vision, double vision, visual changes  ENT: Throat pain, changes in voice,   CV: palpitations, SOB, CP, cough  GI: NVD, difficulty swallowing, abdominal pain  : polyuria, dysuria  Endo: abnormal menses, temperature intolerance, decreased libido  MSK: weakness, joint pain  Skin: rash, dryness, diaphoresis  Heme: Easy bruising,bleeding  Neuro: HA, dizziness, lightheadedness, numbness tingling  Psych: Anxiety, Depression    Vital Signs Last 24 Hrs  T(C): 37.1 (2018 08:59), Max: 38.4 (2018 17:24)  T(F): 98.8 (2018 08:59), Max: 101.1 (2018 17:24)  HR: 79 (2018 08:59) (66 - 96)  BP: 149/74 (2018 08:59) (117/72 - 153/76)  BP(mean): --  RR: 18 (2018 08:59) (18 - 20)  SpO2: 94% (2018 08:59) (92% - 97%)    Weight (kg): 63.6 ( @ 17:24)      Constitutional: wn/wd in NAD.   HEENT: NCAT, MMM, OP clear, EOMI, , no proptosis or lid retraction  Neck: no thyromegaly or palpable thyroid nodules   Respiratory: lungs CTAB.  Cardiovascular: regular rhythm, normal S1 and S2, no audible murmurs, no peripheral edema  GI: soft, NT/ND, no masses/HSM appreciated.  Neurology: no tremors, DTR 2+  Skin: no visible rashes/lesions  Psychiatric: AAO x 3, normal affect/mood.  Ext: radial pulses intact, DP pulses intact, extremities warm, no cyanosis, clubbing or edema.       LABS:                        11.9   9.4   )-----------( 238      ( 2018 17:30 )             35.6         139  |  98  |  19  ----------------------------<  61<L>  3.8   |  31  |  0.63    Ca    8.7      2018 17:30    TPro  6.5  /  Alb  3.9  /  TBili  0.4  /  DBili  x   /  AST  20  /  ALT  10  /  AlkPhos  53        Urinalysis Basic - ( 2018 20:47 )    Color: Yellow / Appearance: Clear / S.015 / pH: x  Gluc: x / Ketone: Trace mg/dL  / Bili: Negative / Urobili: 0.2 E.U./dL   Blood: x / Protein: NEGATIVE mg/dL / Nitrite: NEGATIVE   Leuk Esterase: NEGATIVE / RBC: x / WBC x   Sq Epi: x / Non Sq Epi: x / Bacteria: x      Hemoglobin A1C, Whole Blood: 6.1 ( @ 17:30)        RADIOLOGY & ADDITIONAL STUDIES:  CAPILLARY BLOOD GLUCOSE      POCT Blood Glucose.: 133 mg/dL (2018 07:14)  POCT Blood Glucose.: 127 mg/dL (2018 06:40)  POCT Blood Glucose.: 196 mg/dL (2018 01:14)  POCT Blood Glucose.: 61 mg/dL (2018 23:44)  POCT Blood Glucose.: 102 mg/dL (2018 20:10)  POCT Blood Glucose.: 142 mg/dL (2018 19:39)  POCT Blood Glucose.: 125 mg/dL (2018 18:19)  POCT Blood Glucose.: 50 mg/dL (2018 17:38)  POCT Blood Glucose.: 89 mg/dL (2018 16:42)        A/P:62y Male    1.  DM  Please continue lantus       units at night / morning.  Please continue lispro      units before each meal.  Please continue lispro moderate / low dose sliding scale four times daily with meals and at bedtime    Pt's fingerstick glucose goal is     Will continue to monitor     For discharge, pt can continue    Pt can follow up at discharge with [facility] by calling  to make an appointment.   Will discuss case with     and update primary team HPI: 62yMale with type 2 DM admitted for hyploglycemia in the setting of influenza.   Pt has had DM for about 20 years and was initially managed on oral hypolycemic agents.   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  At that time he was started on an insulin regimen with 26 units of lantus at night and 5 units of humalog after each can of glucerna 1.5, and was using 5 cans per day.   Since that time he has lost 90 pounds from 230 to 140, and his weight has been stable over the past 2 months.   His typical feeding regimen is a can of glucerna 1.5 at 6am, 11am, 1pm, 4pm, and 10pm.    He stopped using novolog a few months ago.   He has never been hospitalized previously for hypoglycemia, DKA/HHS, does not follow with an endocrinologist.  He reports checking his FSG 2-3 times per day and reports fasting numbers of low 100s and mid day of near 150, and evening of low 100s  He has no known complications of diabetes and denies symptoms of peripheral neuropathy, gastroporesis, or retinopathy.  No hx of CKD  On the night prior to admission, he had used 26 units of lantus.  The morning of admission he woke up as usual and had a can of glucerna.  A few hours later he had a second can of glucerna at approximately 11 am. He began feeling week and wobbly, checked his FSG which was 35.  An ambulance was called and EMTs found a fever of 101 and gave him an amp of D50.  On arrival to [facility], he was started on D5 NS at 125 cc/hour which was increeased at 4am to D10 at 125cc per hour.     PMH & Surgical Hx:  CVA  HLD (hyperlipidemia)    FH:  Family history of breast cancer (Sibling)  Family history of malignant neoplasm of ovary (Mother)  Family history of pancreatic cancer (Father)  DM: none    SH:  Smoking: none  Etoh: social  Recreational Drugs: none  Social Life: lives w wife, is employed.     Current Meds:  aspirin  chewable 81 milliGRAM(s) Enteral Tube daily  atorvastatin 80 milliGRAM(s) Oral at bedtime  dextrose 5%. 1000 milliLiter(s) IV Continuous <Continuous>  dextrose 50% Injectable 12.5 Gram(s) IV Push once  dextrose 50% Injectable 25 Gram(s) IV Push once  dextrose 50% Injectable 25 Gram(s) IV Push once  dextrose Gel 1 Dose(s) Oral once PRN  glucagon  Injectable 1 milliGRAM(s) IntraMuscular once PRN  heparin  Injectable 5000 Unit(s) SubCutaneous every 8 hours  influenza   Vaccine 0.5 milliLiter(s) IntraMuscular once  insulin glargine Injectable (LANTUS) 10 Unit(s) SubCutaneous at bedtime  insulin lispro (HumaLOG) corrective regimen sliding scale   SubCutaneous Before meals and at bedtime  oseltamivir Suspension 75 milliGRAM(s) Enteral Tube every 12 hours  repaglinide 1 milliGRAM(s) Oral three times a day before meals      Allergies:  penicillin (Unknown)      ROS:  Denies the following except as indicated.    General: weight loss/weight gain, decreased appetite, fatigue  Eyes: Blurry vision, double vision, visual changes  ENT: Throat pain, changes in voice,   CV: palpitations, SOB, CP, cough  GI: NVD, difficulty swallowing, abdominal pain  : polyuria, dysuria  Endo: abnormal menses, temperature intolerance, decreased libido  MSK: weakness, joint pain  Skin: rash, dryness, diaphoresis  Heme: Easy bruising,bleeding  Neuro: HA, dizziness, lightheadedness, numbness tingling  Psych: Anxiety, Depression    Vital Signs Last 24 Hrs  T(C): 37.1 (2018 08:59), Max: 38.4 (2018 17:24)  T(F): 98.8 (2018 08:59), Max: 101.1 (2018 17:24)  HR: 79 (2018 08:59) (66 - 96)  BP: 149/74 (2018 08:59) (117/72 - 153/76)  BP(mean): --  RR: 18 (2018 08:59) (18 - 20)  SpO2: 94% (2018 08:59) (92% - 97%)    Weight (kg): 63.6 ( @ 17:24)      Constitutional: wn/wd in NAD.   HEENT: NCAT, MMM, OP clear, EOMI, , no proptosis or lid retraction  Neck: no thyromegaly or palpable thyroid nodules   Respiratory: lungs CTAB.  Cardiovascular: regular rhythm, normal S1 and S2, no audible murmurs, no peripheral edema  GI: soft, NT/ND, no masses/HSM appreciated.  Neurology: no tremors, DTR 2+  Skin: no visible rashes/lesions  Psychiatric: AAO x 3, normal affect/mood.  Ext: radial pulses intact, DP pulses intact, extremities warm, no cyanosis, clubbing or edema.       LABS:                        11.9   9.4   )-----------( 238      ( 2018 17:30 )             35.6         139  |  98  |  19  ----------------------------<  61<L>  3.8   |  31  |  0.63    Ca    8.7      2018 17:30    TPro  6.5  /  Alb  3.9  /  TBili  0.4  /  DBili  x   /  AST  20  /  ALT  10  /  AlkPhos  53        Urinalysis Basic - ( 2018 20:47 )    Color: Yellow / Appearance: Clear / S.015 / pH: x  Gluc: x / Ketone: Trace mg/dL  / Bili: Negative / Urobili: 0.2 E.U./dL   Blood: x / Protein: NEGATIVE mg/dL / Nitrite: NEGATIVE   Leuk Esterase: NEGATIVE / RBC: x / WBC x   Sq Epi: x / Non Sq Epi: x / Bacteria: x      Hemoglobin A1C, Whole Blood: 6.1 ( @ 17:30)    POCT Blood Glucose.: 133 mg/dL (2018 07:14)  POCT Blood Glucose.: 127 mg/dL (2018 06:40)  POCT Blood Glucose.: 196 mg/dL (2018 01:14)  POCT Blood Glucose.: 61 mg/dL (2018 23:44)  POCT Blood Glucose.: 102 mg/dL (2018 20:10)  POCT Blood Glucose.: 142 mg/dL (2018 19:39)  POCT Blood Glucose.: 125 mg/dL (2018 18:19)  POCT Blood Glucose.: 50 mg/dL (2018 17:38)  POCT Blood Glucose.: 89 mg/dL (2018 16:42)        A/P:62y Male with hx of type 2 DM, well controlled but likely with masked hypogylcemia at home, presenting with hypoglycemia in setting of flu, now stable.     1.  DM - type 2, controlled, uncomplicated.   Would recommend change in feeding regimen to 2 cans of glucerna 1.5 for breakfast, 1 can at lunch, and 2 cans at dinner.   Can start 10 units lantus at bedtime, and start prandin 1mg prior to each bolus feed.   Please reveiw correct insulin injection technique.     Pt's fingerstick glucose goal is 100-150  Will continue to monitor   For discharge, pt can continue the above regimen    Pt can follow up at discharge with [facility] by calling  to make an appointment.   Will discuss case with Dr. Paige  and update primary team HPI: 62yMale with type 2 DM admitted for hyploglycemia in the setting of influenza.   Pt has had DM for about 20 years and was initially managed on oral hypolycemic agents.   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  At that time he was started on an insulin regimen with 26 units of lantus at night and 5 units of humalog after each can of glucerna 1.5, and was using 5 cans per day.   Since that time he has lost 90 pounds from 230 to 140, and his weight has been stable over the past 2 months.   His typical feeding regimen is a can of glucerna 1.5 at 6am, 11am, 1pm, 4pm, and 10pm.    He stopped using novolog a few months ago.   He has never been hospitalized previously for hypoglycemia, DKA/HHS, does not follow with an endocrinologist.  He reports checking his FSG 2-3 times per day and reports fasting numbers of low 100s and mid day of near 150, and evening of low 100s  He has no known complications of diabetes and denies symptoms of peripheral neuropathy, gastroporesis, or retinopathy.  No hx of CKD  On the night prior to admission, he had used 26 units of lantus.  The morning of admission he woke up as usual and had a can of glucerna.  A few hours later he had a second can of glucerna at approximately 11 am. He began feeling week and wobbly, checked his FSG which was 35.  An ambulance was called and EMTs found a fever of 101 and gave him an amp of D50.  On arrival to [facility], he was started on D5 NS at 125 cc/hour which was increeased at 4am to D10 at 125cc per hour.     PMH & Surgical Hx:  CVA  HLD (hyperlipidemia)    FH:  Family history of breast cancer (Sibling)  Family history of malignant neoplasm of ovary (Mother)  Family history of pancreatic cancer (Father)  DM: none    SH:  Smoking: none  Etoh: social  Recreational Drugs: none  Social Life: lives w wife, is employed.     Current Meds:  aspirin  chewable 81 milliGRAM(s) Enteral Tube daily  atorvastatin 80 milliGRAM(s) Oral at bedtime  dextrose 5%. 1000 milliLiter(s) IV Continuous <Continuous>  dextrose 50% Injectable 12.5 Gram(s) IV Push once  dextrose 50% Injectable 25 Gram(s) IV Push once  dextrose 50% Injectable 25 Gram(s) IV Push once  dextrose Gel 1 Dose(s) Oral once PRN  glucagon  Injectable 1 milliGRAM(s) IntraMuscular once PRN  heparin  Injectable 5000 Unit(s) SubCutaneous every 8 hours  influenza   Vaccine 0.5 milliLiter(s) IntraMuscular once  insulin glargine Injectable (LANTUS) 10 Unit(s) SubCutaneous at bedtime  insulin lispro (HumaLOG) corrective regimen sliding scale   SubCutaneous Before meals and at bedtime  oseltamivir Suspension 75 milliGRAM(s) Enteral Tube every 12 hours  repaglinide 1 milliGRAM(s) Oral three times a day before meals      Allergies:  penicillin (Unknown)      ROS:  Denies the following except as indicated.    General: weight loss/weight gain, decreased appetite, fatigue  Eyes: Blurry vision, double vision, visual changes  ENT: Throat pain, changes in voice,   CV: palpitations, SOB, CP, cough  GI: NVD, difficulty swallowing, abdominal pain  : polyuria, dysuria  Endo: abnormal menses, temperature intolerance, decreased libido  MSK: weakness, joint pain  Skin: rash, dryness, diaphoresis  Heme: Easy bruising,bleeding  Neuro: HA, dizziness, lightheadedness, numbness tingling  Psych: Anxiety, Depression    Vital Signs Last 24 Hrs  T(C): 37.1 (2018 08:59), Max: 38.4 (2018 17:24)  T(F): 98.8 (2018 08:59), Max: 101.1 (2018 17:24)  HR: 79 (2018 08:59) (66 - 96)  BP: 149/74 (2018 08:59) (117/72 - 153/76)  BP(mean): --  RR: 18 (2018 08:59) (18 - 20)  SpO2: 94% (2018 08:59) (92% - 97%)    Weight (kg): 63.6 ( @ 17:24)      Constitutional: wn/wd in NAD.   Respiratory: lungs CTAB.  Cardiovascular: regular rhythm, normal S1 and S2, systolic murmur at apex, no peripheral edema  GI: soft, NT/ND, no masses/HSM appreciated  Neurology: no tremors, DTR 3+ on right  Skin: no visible rashes/lesions  Psychiatric: AAO x 3, normal affect/mood.  Ext: radial pulses intact, DP pulses intact, extremities warm, no cyanosis, clubbing or edema.       LABS:                        11.9   9.4   )-----------( 238      ( 2018 17:30 )             35.6         139  |  98  |  19  ----------------------------<  61<L>  3.8   |  31  |  0.63    Ca    8.7      2018 17:30    TPro  6.5  /  Alb  3.9  /  TBili  0.4  /  DBili  x   /  AST  20  /  ALT  10  /  AlkPhos  53        Urinalysis Basic - ( 2018 20:47 )    Color: Yellow / Appearance: Clear / S.015 / pH: x  Gluc: x / Ketone: Trace mg/dL  / Bili: Negative / Urobili: 0.2 E.U./dL   Blood: x / Protein: NEGATIVE mg/dL / Nitrite: NEGATIVE   Leuk Esterase: NEGATIVE / RBC: x / WBC x   Sq Epi: x / Non Sq Epi: x / Bacteria: x      Hemoglobin A1C, Whole Blood: 6.1 ( @ 17:30)    POCT Blood Glucose.: 133 mg/dL (2018 07:14)  POCT Blood Glucose.: 127 mg/dL (2018 06:40)  POCT Blood Glucose.: 196 mg/dL (2018 01:14)  POCT Blood Glucose.: 61 mg/dL (2018 23:44)  POCT Blood Glucose.: 102 mg/dL (2018 20:10)  POCT Blood Glucose.: 142 mg/dL (2018 19:39)  POCT Blood Glucose.: 125 mg/dL (2018 18:19)  POCT Blood Glucose.: 50 mg/dL (2018 17:38)  POCT Blood Glucose.: 89 mg/dL (2018 16:42)        A/P:62y Male with hx of type 2 DM, well controlled but likely with masked hypogylcemia at home, presenting with hypoglycemia in setting of flu, now stable.     1.  DM - type 2, controlled, uncomplicated.   Would recommend change in feeding regimen to 2 cans of glucerna 1.5 for breakfast, 1 can at lunch, and 2 cans at dinner.   Can start 10 units lantus at bedtime, and start prandin 1mg prior to each bolus feed.   Please reveiw correct insulin injection technique.     Pt's fingerstick glucose goal is 100-150  Will continue to monitor   For discharge, pt can continue the above regimen    [facility] by calling  to make an appointment.   Will discuss case with Dr. Paige  and update primary team

## 2018-01-20 NOTE — DIETITIAN INITIAL EVALUATION ADULT. - OTHER INFO
61y/o M admitted with weakness, lethargy, and hypoglycemia. He is seen asleep in bed with RN at bedside. Pt with piror PEG on bolus feeds PTA. Denies wt changes and intolerance to EN. Pt currently ordered for glucerna 1.5, 6cans/day. RN reports tolerance to current EN regimen. Will follow.

## 2018-01-20 NOTE — DIETITIAN INITIAL EVALUATION ADULT. - PROBLEM SELECTOR PLAN 5
Patient reports 90 pound weight loss since the stroke; may be secondary to dysphagia from stroke and poor nutrition ; No B symptoms reported by patient- however has extensive family hx of cancer as seen above   -- nutritionist consult  -- routine cancer surveillance as outpatient

## 2018-01-20 NOTE — DISCHARGE NOTE ADULT - CARE PLAN
Principal Discharge DX:	Hypoglycemia  Goal:	follow-up, change medication regimen  Assessment and plan of treatment:	plan to change in feeding regimen to 2 cans of glucerna 1.5 for breakfast, 1 can at lunch, and 2 cans at dinner. Can start 10 units lantus at bedtime, and start prandin 1mg prior to each bolus feed. Pt can follow up at discharge with [facility] by calling  to make an appointment within one week of discharge.  Secondary Diagnosis:	Diabetes  Assessment and plan of treatment:	You should follow-up with your endocrinologist as an outpatient and check your sugars. You should come back to the ER if you have worsening symptoms.  Secondary Diagnosis:	HLD (hyperlipidemia)  Assessment and plan of treatment:	You should continue home medications and follow up with your primary medical doctor  Secondary Diagnosis:	HTN (hypertension)  Assessment and plan of treatment:	You should continue your home medications as prescribed.  Secondary Diagnosis:	Influenza A  Assessment and plan of treatment:	You should continue tamiflu until your pills are completed. You should follow-up with PMD in  Secondary Diagnosis:	Stroke  Assessment and plan of treatment:	You should follow up with your PMD for your previous stroke. If you have new symptoms concerning for stroke, you should return to the ER.

## 2018-01-20 NOTE — DISCHARGE NOTE ADULT - MEDICATION SUMMARY - MEDICATIONS TO TAKE
I will START or STAY ON the medications listed below when I get home from the hospital:    aspirin 81 mg oral tablet  -- 1 tab(s) by mouth once a day  -- Indication: For Prophylactic measure    repaglinide 1 mg oral tablet  -- 1 tab(s) by mouth 3 times a day (before meals)  -- Indication: For Diabetes    Lantus  -- 10 unit(s) subcutaneous once a day  -- Indication: For Diabetes    atorvastatin 80 mg oral tablet  -- 1 tab(s) by mouth once a day (at bedtime)  -- Indication: For HLD (hyperlipidemia)    Tamiflu 6 mg/mL oral suspension  -- 60 milliliter(s) by mouth 2 times a day   -- Check with your doctor before becoming pregnant.  Expires___________________  Finish all this medication unless otherwise directed by prescriber.  Refrigerate and shake well.  Expires_______________________    -- Indication: For FLU    Norvasc  -- orally once a day  -- Indication: For HTN (hypertension)

## 2018-01-20 NOTE — DISCHARGE NOTE ADULT - INSTRUCTIONS
plan to change in feeding regimen to 2 cans of glucerna 1.5 for breakfast, 1 can at lunch, and 2 cans at dinner.

## 2018-01-20 NOTE — DISCHARGE NOTE ADULT - PATIENT PORTAL LINK FT
“You can access the Follow[employer] Patient Portal, offered by [facility], by registering with the following website: [email or web address]”

## 2018-01-20 NOTE — CONSULT NOTE ADULT - ATTENDING COMMENTS
Pt seen with Dr. Meléndez on rounds this morning.  The hypoglycemic episode can be attributed to a Lantus dose which is well above what are likely his true basal requirements, though the timing of the episode (only 2-3 hours after his previous can of Glucerna) is a bit surprising.  Will decrease the Lantus to 10 units as the next step, and will try covering his bolus feeds with Prandin.  Will also try to decrease his feeds from 5 to 3X/day if he can tolerate two cans of Glucerna as a single bolus.  If the Prandin fails to cover the feeds adequately, will need to change to pre-meal Novolog, which he previously took before his CVA.

## 2018-01-20 NOTE — DISCHARGE NOTE ADULT - HOSPITAL COURSE
Pt ia a 61 yo male w/ a  PMHx of HTN, T2DM (states on Lantus 26 units QHS only), CVA s/ residual dysphagia w/ PEG and tube feeds May 2017, who p/w with weakness and lethargy in am. Patient reports he had just returned from [employer] at 11 am preparing shabbat dinner when his wife noticed he was wobbly and looked unwell. When asked,  Pt did c/o of some dizziness while at [employer]. Wife checked temp, and at that time, temp 98.3 at 11 am. Patient does report a chronic cough (Usually clear) that worsened with yellowish sputum today. Patient went to sleep and woke up at 3 pm and still felt weak. In the setting of previous hx of stroke, wife called EMS who upon arrival noted a  BS of 35. Pt was given 1 amp of D50. Also noted to have temp 101 at that time. ROS is negative for fever, chills, headache, rhinnorhea, CP, SOB, or abdominal pain.N/V/D/C or urinary complaints. Pt reports his tube feeds are with Glucerna 1.5, approx 5 cans per day. Pt reports he thinks he had 2-3 cans before admission. Of note, Wife reports since CVA May 2017 pt has lost significant weight 230 lbs to 140 lbs. In the ED, vitals were Max: 101.1 HR: 75 - 96BP: 117/72 - 120/76RR:18 - 20 SpO2: 96% - 97%. Patient in the ED was given tamiflu, 1 amp of glucose, tylenol. patient was admitted and endocrinology was consulted. The patient was initially started on d10 and FS improved and D10 was turned off.    Patient will be discharge with tamiflu and plan to change in feeding regimen to 2 cans of glucerna 1.5 for breakfast, 1 can at lunch, and 2 cans at dinner. Can start 10 units lantus at bedtime, and start prandin 1mg prior to each bolus feed. Pt can follow up at discharge with [facility] by calling  to make an appointment within one week of discharge.    The patient is stable and ready for discharge. Reviewed new medication regimen and new feeds regimen.  Patient understands given that he has the flu, he should follow-up with his PMD within 5 days of discharge.

## 2018-01-20 NOTE — DIETITIAN INITIAL EVALUATION ADULT. - PROBLEM SELECTOR PLAN 6
A1C of 6.1- patient is well controlled   -- endo consult in am for med regiment   -- DOES not Warrant lantus anymore- needs to be discontinued upon discharge

## 2018-01-20 NOTE — DIETITIAN INITIAL EVALUATION ADULT. - NS AS NUTRI INTERV ENTERAL NUTRITION
Glucerna 1.2 via PEG, 6cans/day (1422ml TV, 1710kcal, 85g, 1170ml water). Add 800ml free water/day or per MD. Monitor for s/s of intolerance.

## 2018-01-20 NOTE — DIETITIAN INITIAL EVALUATION ADULT. - PROBLEM SELECTOR PLAN 4
Occurred 5/2017; residual deficits includes dysphagia; follows up with Dr pereira  -- c/w ASA   -- c/w lipiitor   -- check Lipid profile in am

## 2018-01-20 NOTE — DISCHARGE NOTE ADULT - CARE PROVIDER_API CALL
Elena Meléndez; PhD), Internal Medicine  [address] [place] [id number]  Phone: [phone]  Fax: [phone]

## 2018-01-20 NOTE — DISCHARGE NOTE ADULT - PLAN OF CARE
follow-up, change medication regimen plan to change in feeding regimen to 2 cans of glucerna 1.5 for breakfast, 1 can at lunch, and 2 cans at dinner. Can start 10 units lantus at bedtime, and start prandin 1mg prior to each bolus feed. Pt can follow up at discharge with [facility] by calling  to make an appointment within one week of discharge. You should follow-up with your endocrinologist as an outpatient and check your sugars. You should come back to the ER if you have worsening symptoms. You should continue home medications and follow up with your primary medical doctor You should continue your home medications as prescribed. You should continue tamiflu until your pills are completed. You should follow-up with PMD in You should follow up with your PMD for your previous stroke. If you have new symptoms concerning for stroke, you should return to the ER.

## 2018-01-21 LAB
CULTURE RESULTS: SIGNIFICANT CHANGE UP
SPECIMEN SOURCE: SIGNIFICANT CHANGE UP

## 2018-01-23 DIAGNOSIS — Z87.891 PERSONAL HISTORY OF NICOTINE DEPENDENCE: ICD-10-CM

## 2018-01-23 DIAGNOSIS — T38.3X5A ADVERSE EFFECT OF INSULIN AND ORAL HYPOGLYCEMIC [ANTIDIABETIC] DRUGS, INITIAL ENCOUNTER: ICD-10-CM

## 2018-01-23 DIAGNOSIS — Z79.4 LONG TERM (CURRENT) USE OF INSULIN: ICD-10-CM

## 2018-01-23 DIAGNOSIS — Z80.3 FAMILY HISTORY OF MALIGNANT NEOPLASM OF BREAST: ICD-10-CM

## 2018-01-23 DIAGNOSIS — E11.649 TYPE 2 DIABETES MELLITUS WITH HYPOGLYCEMIA WITHOUT COMA: ICD-10-CM

## 2018-01-23 DIAGNOSIS — J10.1 INFLUENZA DUE TO OTHER IDENTIFIED INFLUENZA VIRUS WITH OTHER RESPIRATORY MANIFESTATIONS: ICD-10-CM

## 2018-01-23 DIAGNOSIS — I10 ESSENTIAL (PRIMARY) HYPERTENSION: ICD-10-CM

## 2018-01-23 DIAGNOSIS — Z80.0 FAMILY HISTORY OF MALIGNANT NEOPLASM OF DIGESTIVE ORGANS: ICD-10-CM

## 2018-01-23 DIAGNOSIS — Z93.1 GASTROSTOMY STATUS: ICD-10-CM

## 2018-01-23 DIAGNOSIS — B97.4 RESPIRATORY SYNCYTIAL VIRUS AS THE CAUSE OF DISEASES CLASSIFIED ELSEWHERE: ICD-10-CM

## 2018-01-23 DIAGNOSIS — E78.5 HYPERLIPIDEMIA, UNSPECIFIED: ICD-10-CM
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
## 2018-01-23 DIAGNOSIS — Z79.82 LONG TERM (CURRENT) USE OF ASPIRIN: ICD-10-CM

## 2018-01-23 DIAGNOSIS — Z88.0 ALLERGY STATUS TO PENICILLIN: ICD-10-CM

## 2018-01-23 DIAGNOSIS — I69.391 DYSPHAGIA FOLLOWING CEREBRAL INFARCTION: ICD-10-CM

## 2018-01-23 DIAGNOSIS — K22.0 ACHALASIA OF CARDIA: ICD-10-CM

## 2018-01-24 LAB
CULTURE RESULTS: SIGNIFICANT CHANGE UP
CULTURE RESULTS: SIGNIFICANT CHANGE UP
SPECIMEN SOURCE: SIGNIFICANT CHANGE UP
SPECIMEN SOURCE: SIGNIFICANT CHANGE UP

## 2018-01-26 PROBLEM — I63.9 CEREBRAL INFARCTION, UNSPECIFIED: Chronic | Status: ACTIVE | Noted: 2018-01-19

## 2018-01-31 ENCOUNTER — APPOINTMENT (OUTPATIENT)
Dept: HEART AND VASCULAR | Facility: CLINIC | Age: 63
End: 2018-01-31
Payer: MEDICAID

## 2018-01-31 VITALS
BODY MASS INDEX: 22.5 KG/M2 | TEMPERATURE: 98.3 F | SYSTOLIC BLOOD PRESSURE: 100 MMHG | DIASTOLIC BLOOD PRESSURE: 70 MMHG | OXYGEN SATURATION: 99 % | WEIGHT: 139.99 LBS | HEART RATE: 65 BPM | HEIGHT: 66 IN

## 2018-01-31 PROCEDURE — 99204 OFFICE O/P NEW MOD 45 MIN: CPT | Mod: 25

## 2018-01-31 PROCEDURE — 93000 ELECTROCARDIOGRAM COMPLETE: CPT

## 2018-02-02 ENCOUNTER — APPOINTMENT (OUTPATIENT)
Dept: HEART AND VASCULAR | Facility: CLINIC | Age: 63
End: 2018-02-02

## 2018-02-02 ENCOUNTER — EMERGENCY (EMERGENCY)
Facility: HOSPITAL | Age: 63
LOS: 1 days | Discharge: HOME | End: 2018-02-02
Attending: EMERGENCY MEDICINE | Admitting: EMERGENCY MEDICINE
Payer: MEDICAID

## 2018-02-02 VITALS
HEIGHT: 66 IN | RESPIRATION RATE: 16 BRPM | SYSTOLIC BLOOD PRESSURE: 110 MMHG | OXYGEN SATURATION: 97 % | TEMPERATURE: 98 F | HEART RATE: 87 BPM | WEIGHT: 145.06 LBS | DIASTOLIC BLOOD PRESSURE: 67 MMHG

## 2018-02-02 DIAGNOSIS — Z79.899 OTHER LONG TERM (CURRENT) DRUG THERAPY: ICD-10-CM

## 2018-02-02 DIAGNOSIS — K94.23 GASTROSTOMY MALFUNCTION: ICD-10-CM

## 2018-02-02 DIAGNOSIS — I10 ESSENTIAL (PRIMARY) HYPERTENSION: ICD-10-CM

## 2018-02-02 DIAGNOSIS — Z79.82 LONG TERM (CURRENT) USE OF ASPIRIN: ICD-10-CM

## 2018-02-02 DIAGNOSIS — E11.9 TYPE 2 DIABETES MELLITUS WITHOUT COMPLICATIONS: ICD-10-CM

## 2018-02-02 DIAGNOSIS — Z88.0 ALLERGY STATUS TO PENICILLIN: ICD-10-CM

## 2018-02-02 PROCEDURE — 99282 EMERGENCY DEPT VISIT SF MDM: CPT

## 2018-02-02 NOTE — ED PROVIDER NOTE - SKIN, MLM
Skin normal color for race, warm, dry and intact. No evidence of rash. No signs of erythema, fluctuance or purulence around PEG site.

## 2018-02-02 NOTE — ED ADULT NURSE NOTE - CHPI ED SYMPTOMS NEG
no dizziness/no weakness/no decreased eating/drinking/no vomiting/no chills/no nausea/no tingling/no fever/no numbness

## 2018-02-02 NOTE — ED PROVIDER NOTE - ATTENDING CONTRIBUTION TO CARE
63 yo M w/ DM, HTN, HLD, dysphagia 2/2 CVA presents w/ clogged PEG tube x 1 day.  Pt had PEG tube placed 8 months ago at [facility].  ROS negative for fevers, chills, nausea, vomiting, cough, CP, SOB, abdominal pain, diarrhea.  PE - PEG tube in place without leakage around site or evidence of infection such as redness or drainage.  Resident used pediatric ng tube to snake into pt's G tube and food clot loosened.  Resident able to flush and pt able to feed himself in ED via PEG.  No acute complications.  Pt to follow up with GI as scheduled.

## 2018-02-02 NOTE — ED PROVIDER NOTE - MEDICAL DECISION MAKING DETAILS
61 yo M w/ dysphagia 2/2 CVA presents w/ clogged PEG tube x 1 day.  Pediatric NG tube used to clear PEG tube followed by ginger ale flush.  PEG now functioning, pt able to self feed.  Pt to f/u w/ existing gastroenterologist.

## 2018-02-09 ENCOUNTER — MOBILE ON CALL (OUTPATIENT)
Age: 63
End: 2018-02-09

## 2018-02-15 ENCOUNTER — APPOINTMENT (OUTPATIENT)
Dept: GASTROENTEROLOGY | Facility: HOSPITAL | Age: 63
End: 2018-02-15

## 2018-02-15 ENCOUNTER — CHART COPY (OUTPATIENT)
Age: 63
End: 2018-02-15

## 2018-02-15 ENCOUNTER — OUTPATIENT (OUTPATIENT)
Dept: OUTPATIENT SERVICES | Facility: HOSPITAL | Age: 63
LOS: 1 days | Discharge: HOME | End: 2018-02-15
Payer: MEDICAID

## 2018-02-15 LAB
GLUCOSE BLDC GLUCOMTR-MCNC: 107 MG/DL — HIGH (ref 70–99)
GLUCOSE BLDC GLUCOMTR-MCNC: 119 MG/DL — HIGH (ref 70–99)

## 2018-02-15 PROCEDURE — 82962 GLUCOSE BLOOD TEST: CPT

## 2018-02-15 PROCEDURE — 43235 EGD DIAGNOSTIC BRUSH WASH: CPT

## 2018-02-15 PROCEDURE — 43499 UNLISTED PROCEDURE ESOPHAGUS: CPT

## 2018-02-15 PROCEDURE — C1889: CPT

## 2018-03-05 ENCOUNTER — APPOINTMENT (OUTPATIENT)
Dept: INTERNAL MEDICINE | Facility: CLINIC | Age: 63
End: 2018-03-05
Payer: MEDICAID

## 2018-03-05 VITALS
OXYGEN SATURATION: 99 % | HEIGHT: 66 IN | BODY MASS INDEX: 22.18 KG/M2 | WEIGHT: 138 LBS | HEART RATE: 69 BPM | SYSTOLIC BLOOD PRESSURE: 135 MMHG | TEMPERATURE: 98.5 F | DIASTOLIC BLOOD PRESSURE: 75 MMHG

## 2018-03-05 PROCEDURE — G0009: CPT

## 2018-03-05 PROCEDURE — 99214 OFFICE O/P EST MOD 30 MIN: CPT

## 2018-03-05 PROCEDURE — 99204 OFFICE O/P NEW MOD 45 MIN: CPT

## 2018-03-05 PROCEDURE — 90732 PPSV23 VACC 2 YRS+ SUBQ/IM: CPT

## 2018-03-06 ENCOUNTER — APPOINTMENT (OUTPATIENT)
Dept: NEUROLOGY | Facility: CLINIC | Age: 63
End: 2018-03-06
Payer: MEDICAID

## 2018-03-06 VITALS
TEMPERATURE: 98 F | OXYGEN SATURATION: 98 % | WEIGHT: 140 LBS | HEART RATE: 75 BPM | SYSTOLIC BLOOD PRESSURE: 144 MMHG | DIASTOLIC BLOOD PRESSURE: 85 MMHG | BODY MASS INDEX: 22.5 KG/M2 | HEIGHT: 66 IN

## 2018-03-06 PROCEDURE — 99214 OFFICE O/P EST MOD 30 MIN: CPT

## 2018-03-09 ENCOUNTER — EMERGENCY (EMERGENCY)
Facility: HOSPITAL | Age: 63
LOS: 1 days | Discharge: HOME | End: 2018-03-09
Admitting: EMERGENCY MEDICINE
Payer: MEDICAID

## 2018-03-09 VITALS
DIASTOLIC BLOOD PRESSURE: 85 MMHG | RESPIRATION RATE: 16 BRPM | SYSTOLIC BLOOD PRESSURE: 144 MMHG | HEIGHT: 66 IN | HEART RATE: 83 BPM | TEMPERATURE: 98 F | OXYGEN SATURATION: 100 % | WEIGHT: 139.99 LBS

## 2018-03-09 VITALS
OXYGEN SATURATION: 98 % | SYSTOLIC BLOOD PRESSURE: 137 MMHG | HEART RATE: 883 BPM | TEMPERATURE: 98 F | RESPIRATION RATE: 16 BRPM | DIASTOLIC BLOOD PRESSURE: 84 MMHG

## 2018-03-09 DIAGNOSIS — Z79.899 OTHER LONG TERM (CURRENT) DRUG THERAPY: ICD-10-CM

## 2018-03-09 DIAGNOSIS — K94.23 GASTROSTOMY MALFUNCTION: ICD-10-CM

## 2018-03-09 DIAGNOSIS — Z79.82 LONG TERM (CURRENT) USE OF ASPIRIN: ICD-10-CM

## 2018-03-09 DIAGNOSIS — Z88.0 ALLERGY STATUS TO PENICILLIN: ICD-10-CM

## 2018-03-09 DIAGNOSIS — Z79.4 LONG TERM (CURRENT) USE OF INSULIN: ICD-10-CM

## 2018-03-09 PROCEDURE — 74018 RADEX ABDOMEN 1 VIEW: CPT | Mod: 26

## 2018-03-09 PROCEDURE — 99283 EMERGENCY DEPT VISIT LOW MDM: CPT | Mod: 25

## 2018-03-09 PROCEDURE — 74018 RADEX ABDOMEN 1 VIEW: CPT

## 2018-03-09 RX ORDER — DIATRIZOATE MEGLUMINE 180 MG/ML
30 INJECTION, SOLUTION INTRAVESICAL ONCE
Qty: 0 | Refills: 0 | Status: COMPLETED | OUTPATIENT
Start: 2018-03-09 | End: 2018-03-09

## 2018-03-09 RX ADMIN — DIATRIZOATE MEGLUMINE 30 MILLILITER(S): 180 INJECTION, SOLUTION INTRAVESICAL at 14:37

## 2018-03-09 NOTE — ED PROVIDER NOTE - MEDICAL DECISION MAKING DETAILS
pt to ed co needing feeding tube which was placed 10 months ago changed unable to feed new tube placed and confirmed will DC I have discussed the discharge plan with the patient. The patient agrees with the plan, as discussed.  The patient understands Emergency Department diagnosis is a preliminary diagnosis often based on limited information and that the patient must adhere to the follow-up plan as discussed.  The patient understands that if the symptoms worsen or if prescribed medications do not have the desired/planned effect that the patient may return to the Emergency Department at any time for further evaluation and treatment.

## 2018-03-09 NOTE — ED PROVIDER NOTE - OBJECTIVE STATEMENT
pt to ed co feeding tube being clogged pt has long history dysphagia and has feeding tube placed 10 months ago and today clogged and wont flush or feed no pain no fever no dizzy no headache no chills no NVD no chest pain no SOB no shakes no aches no other  injury no other complaints

## 2018-03-09 NOTE — ED ADULT TRIAGE NOTE - CHIEF COMPLAINT QUOTE
Pt CO clogged Feeding tube today.  Pt states "I had a can this morning but I haven't been able to get the second one down because the tube is clogged."  PT denies Abd Pain, N/V/D, SOB, Fevers and CP

## 2018-03-09 NOTE — ED PROCEDURE NOTE - CPROC ED POST RADIOGRAPHY1
feeding tube in correct gastric position/no extravasation of contrast/post-procedure radiography performed

## 2018-03-13 ENCOUNTER — OUTPATIENT (OUTPATIENT)
Dept: OUTPATIENT SERVICES | Facility: HOSPITAL | Age: 63
LOS: 1 days | Discharge: HOME | End: 2018-03-13
Payer: MEDICAID

## 2018-03-13 ENCOUNTER — APPOINTMENT (OUTPATIENT)
Dept: GASTROENTEROLOGY | Facility: HOSPITAL | Age: 63
End: 2018-03-13
Payer: MEDICAID

## 2018-03-13 PROCEDURE — 91010 ESOPHAGUS MOTILITY STUDY: CPT | Mod: 26

## 2018-03-13 PROCEDURE — 91037 ESOPH IMPED FUNCTION TEST: CPT | Mod: 26

## 2018-03-13 PROCEDURE — 91010 ESOPHAGUS MOTILITY STUDY: CPT

## 2018-03-30 ENCOUNTER — APPOINTMENT (OUTPATIENT)
Dept: HEART AND VASCULAR | Facility: CLINIC | Age: 63
End: 2018-03-30

## 2018-04-05 ENCOUNTER — APPOINTMENT (OUTPATIENT)
Dept: GASTROENTEROLOGY | Facility: CLINIC | Age: 63
End: 2018-04-05
Payer: MEDICAID

## 2018-04-05 VITALS
RESPIRATION RATE: 14 BRPM | HEART RATE: 73 BPM | WEIGHT: 132 LBS | HEIGHT: 66 IN | BODY MASS INDEX: 21.21 KG/M2 | SYSTOLIC BLOOD PRESSURE: 102 MMHG | OXYGEN SATURATION: 97 % | TEMPERATURE: 97.7 F | DIASTOLIC BLOOD PRESSURE: 63 MMHG

## 2018-04-05 PROCEDURE — 99213 OFFICE O/P EST LOW 20 MIN: CPT | Mod: 25

## 2018-04-10 ENCOUNTER — APPOINTMENT (OUTPATIENT)
Dept: OTOLARYNGOLOGY | Facility: CLINIC | Age: 63
End: 2018-04-10
Payer: MEDICAID

## 2018-04-10 VITALS
OXYGEN SATURATION: 99 % | HEART RATE: 63 BPM | DIASTOLIC BLOOD PRESSURE: 72 MMHG | TEMPERATURE: 97.9 F | SYSTOLIC BLOOD PRESSURE: 120 MMHG

## 2018-04-10 PROCEDURE — 31575 DIAGNOSTIC LARYNGOSCOPY: CPT

## 2018-04-10 PROCEDURE — 99215 OFFICE O/P EST HI 40 MIN: CPT | Mod: 25

## 2018-04-10 RX ORDER — ACETAMINOPHEN, DEXTROMETHORPHAN HBR, DOXYLAMINE SUCCINATE 650; 30; 12.5 MG/30ML; MG/30ML; MG/30ML
32G X 4 MM LIQUID ORAL
Qty: 150 | Refills: 0 | Status: DISCONTINUED | COMMUNITY
Start: 2018-01-05

## 2018-04-10 RX ORDER — AZITHROMYCIN 250 MG/1
250 TABLET, FILM COATED ORAL
Qty: 6 | Refills: 0 | Status: DISCONTINUED | COMMUNITY
Start: 2018-03-12

## 2018-04-10 RX ORDER — OSELTAMIVIR PHOSPHATE 6 MG/ML
6 POWDER, FOR SUSPENSION ORAL
Qty: 180 | Refills: 0 | Status: DISCONTINUED | COMMUNITY
Start: 2018-01-20

## 2018-04-10 RX ORDER — NUT.TX.IMPAIRED DIGESTIVE FXN 0.035-1/ML
LIQUID (ML) ORAL
Qty: 34128 | Refills: 0 | Status: DISCONTINUED | COMMUNITY
Start: 2017-10-11

## 2018-04-10 RX ORDER — LANCETS 33 GAUGE
EACH MISCELLANEOUS
Qty: 100 | Refills: 0 | Status: DISCONTINUED | COMMUNITY
Start: 2018-01-09

## 2018-04-10 RX ORDER — BLOOD SUGAR DIAGNOSTIC
STRIP MISCELLANEOUS
Qty: 50 | Refills: 0 | Status: DISCONTINUED | COMMUNITY
Start: 2017-10-17

## 2018-04-10 RX ORDER — ETODOLAC 400 MG/1
400 TABLET, FILM COATED ORAL
Qty: 20 | Refills: 0 | Status: DISCONTINUED | COMMUNITY
Start: 2018-03-12

## 2018-04-10 RX ORDER — ISOPROPYL ALCOHOL 70 ML/100ML
70 SWAB TOPICAL
Qty: 100 | Refills: 0 | Status: DISCONTINUED | COMMUNITY
Start: 2017-10-17

## 2018-04-10 RX ORDER — LEVOFLOXACIN 500 MG/1
500 TABLET, FILM COATED ORAL DAILY
Qty: 7 | Refills: 0 | Status: DISCONTINUED | COMMUNITY
Start: 2018-02-15 | End: 2018-04-10

## 2018-04-10 RX ORDER — REPAGLINIDE 1 MG/1
1 TABLET ORAL
Qty: 90 | Refills: 0 | Status: DISCONTINUED | COMMUNITY
Start: 2018-01-20

## 2018-04-18 ENCOUNTER — RESULT REVIEW (OUTPATIENT)
Age: 63
End: 2018-04-18

## 2018-04-18 LAB
BASOPHILS # BLD AUTO: 0.03 K/UL
BASOPHILS NFR BLD AUTO: 0.5 %
EOSINOPHIL # BLD AUTO: 0.09 K/UL
EOSINOPHIL NFR BLD AUTO: 1.4 %
HCT VFR BLD CALC: 39.2 %
HGB BLD-MCNC: 12.5 G/DL
IMM GRANULOCYTES NFR BLD AUTO: 0.2 %
INR PPP: 1.02 RATIO
LYMPHOCYTES # BLD AUTO: 1.91 K/UL
LYMPHOCYTES NFR BLD AUTO: 30.2 %
MAN DIFF?: NORMAL
MCHC RBC-ENTMCNC: 31.3 PG
MCHC RBC-ENTMCNC: 31.9 GM/DL
MCV RBC AUTO: 98.2 FL
MONOCYTES # BLD AUTO: 0.5 K/UL
MONOCYTES NFR BLD AUTO: 7.9 %
NEUTROPHILS # BLD AUTO: 3.78 K/UL
NEUTROPHILS NFR BLD AUTO: 59.8 %
PLATELET # BLD AUTO: 299 K/UL
PT BLD: 11.5 SEC
RBC # BLD: 3.99 M/UL
RBC # FLD: 14.4 %
WBC # FLD AUTO: 6.32 K/UL

## 2018-04-19 ENCOUNTER — APPOINTMENT (OUTPATIENT)
Dept: OTOLARYNGOLOGY | Facility: CLINIC | Age: 63
End: 2018-04-19

## 2018-05-07 ENCOUNTER — APPOINTMENT (OUTPATIENT)
Dept: GASTROENTEROLOGY | Facility: HOSPITAL | Age: 63
End: 2018-05-07

## 2018-05-07 ENCOUNTER — OUTPATIENT (OUTPATIENT)
Dept: OUTPATIENT SERVICES | Facility: HOSPITAL | Age: 63
LOS: 1 days | Discharge: HOME | End: 2018-05-07
Payer: MEDICAID

## 2018-05-07 DIAGNOSIS — R13.10 DYSPHAGIA, UNSPECIFIED: ICD-10-CM

## 2018-05-07 PROCEDURE — 43236 UPPR GI SCOPE W/SUBMUC INJ: CPT | Mod: GC

## 2018-05-07 PROCEDURE — 43259 EGD US EXAM DUODENUM/JEJUNUM: CPT | Mod: 59,GC

## 2018-05-23 PROCEDURE — 87581 M.PNEUMON DNA AMP PROBE: CPT

## 2018-05-23 PROCEDURE — 87486 CHLMYD PNEUM DNA AMP PROBE: CPT

## 2018-05-23 PROCEDURE — 80061 LIPID PANEL: CPT

## 2018-05-23 PROCEDURE — 80048 BASIC METABOLIC PNL TOTAL CA: CPT

## 2018-05-23 PROCEDURE — 36415 COLL VENOUS BLD VENIPUNCTURE: CPT

## 2018-05-23 PROCEDURE — 84100 ASSAY OF PHOSPHORUS: CPT

## 2018-05-23 PROCEDURE — 81003 URINALYSIS AUTO W/O SCOPE: CPT

## 2018-05-23 PROCEDURE — 86803 HEPATITIS C AB TEST: CPT

## 2018-05-23 PROCEDURE — 71045 X-RAY EXAM CHEST 1 VIEW: CPT

## 2018-05-23 PROCEDURE — 99285 EMERGENCY DEPT VISIT HI MDM: CPT | Mod: 25

## 2018-05-23 PROCEDURE — 96375 TX/PRO/DX INJ NEW DRUG ADDON: CPT

## 2018-05-23 PROCEDURE — 87798 DETECT AGENT NOS DNA AMP: CPT

## 2018-05-23 PROCEDURE — 96374 THER/PROPH/DIAG INJ IV PUSH: CPT

## 2018-05-23 PROCEDURE — 82803 BLOOD GASES ANY COMBINATION: CPT

## 2018-05-23 PROCEDURE — 83036 HEMOGLOBIN GLYCOSYLATED A1C: CPT

## 2018-05-23 PROCEDURE — 85025 COMPLETE CBC W/AUTO DIFF WBC: CPT

## 2018-05-23 PROCEDURE — 87040 BLOOD CULTURE FOR BACTERIA: CPT

## 2018-05-23 PROCEDURE — 83735 ASSAY OF MAGNESIUM: CPT

## 2018-05-23 PROCEDURE — 83605 ASSAY OF LACTIC ACID: CPT

## 2018-05-23 PROCEDURE — 85027 COMPLETE CBC AUTOMATED: CPT

## 2018-05-23 PROCEDURE — 93005 ELECTROCARDIOGRAM TRACING: CPT

## 2018-05-23 PROCEDURE — 87633 RESP VIRUS 12-25 TARGETS: CPT

## 2018-05-23 PROCEDURE — 80053 COMPREHEN METABOLIC PANEL: CPT

## 2018-05-23 PROCEDURE — 82962 GLUCOSE BLOOD TEST: CPT

## 2018-05-23 PROCEDURE — 87086 URINE CULTURE/COLONY COUNT: CPT

## 2018-06-04 ENCOUNTER — APPOINTMENT (OUTPATIENT)
Dept: NEUROLOGY | Facility: CLINIC | Age: 63
End: 2018-06-04
Payer: MEDICAID

## 2018-06-04 ENCOUNTER — EMERGENCY (EMERGENCY)
Facility: HOSPITAL | Age: 63
LOS: 1 days | Discharge: HOME | End: 2018-06-04
Admitting: EMERGENCY MEDICINE
Payer: MEDICAID

## 2018-06-04 VITALS
OXYGEN SATURATION: 99 % | DIASTOLIC BLOOD PRESSURE: 72 MMHG | TEMPERATURE: 97 F | SYSTOLIC BLOOD PRESSURE: 106 MMHG | HEART RATE: 80 BPM | RESPIRATION RATE: 18 BRPM

## 2018-06-04 VITALS
HEART RATE: 73 BPM | DIASTOLIC BLOOD PRESSURE: 73 MMHG | TEMPERATURE: 97.4 F | WEIGHT: 132 LBS | BODY MASS INDEX: 21.21 KG/M2 | OXYGEN SATURATION: 99 % | HEIGHT: 66 IN | SYSTOLIC BLOOD PRESSURE: 120 MMHG

## 2018-06-04 DIAGNOSIS — Z86.79 PERSONAL HISTORY OF OTHER DISEASES OF THE CIRCULATORY SYSTEM: ICD-10-CM

## 2018-06-04 PROCEDURE — 99282 EMERGENCY DEPT VISIT SF MDM: CPT

## 2018-06-04 PROCEDURE — 99213 OFFICE O/P EST LOW 20 MIN: CPT

## 2018-06-04 NOTE — ED ADULT NURSE NOTE - CHIEF COMPLAINT QUOTE
"the cap fell off my feeding tube and its leaking." pt had feeding tube place 3 months ago, hx of stroke 1 year ago. denies pain, fever, n/v/d.

## 2018-06-04 NOTE — ED ADULT NURSE NOTE - OBJECTIVE STATEMENT
Patient states cap of Peg tube fell off last night and wanted it replaced, no other GI or other symptom complaint.  States has PEG due to Hx. of CVA and difficulties swallowing.

## 2018-06-04 NOTE — ED ADULT TRIAGE NOTE - CHIEF COMPLAINT QUOTE
"the cap fell on my feeding tube and its leaking." pt had feeding tube place 3 months ago, hx of stroke 1 year ago. denies pain, fever, n/v/d. "the cap fell off my feeding tube and its leaking." pt had feeding tube place 3 months ago, hx of stroke 1 year ago. denies pain, fever, n/v/d.

## 2018-06-04 NOTE — ED PROVIDER NOTE - MEDICAL DECISION MAKING DETAILS
New cap placed on PEG tube.  Stable for outpatient follow up.  Pt will see his GI for consideration of a new PEG if he has further problems.

## 2018-06-04 NOTE — ED PROVIDER NOTE - PHYSICAL EXAMINATION
GEN: awake, alert, NAD  EYES: Clear, Pupils equal and round.  PULM: Lungs clear  CV: RRR S1S2  GI: Abd soft, nontender  PEG site CDI.  Cap broken off PEG, but otherwise appears intact.  MSK: PUGH without difficulty  SKIN: normal color and turgor, no rash  NEURO: Alert, oriented. PUGH normally.  Speech clear.  Gait steady.

## 2018-06-04 NOTE — ED ADULT NURSE NOTE - CHPI ED SYMPTOMS NEG
no diarrhea/no dysuria/no vomiting/no nausea/no fever/no burning urination/no chills/no abdominal distension/no blood in stool

## 2018-06-04 NOTE — ED PROVIDER NOTE - OBJECTIVE STATEMENT
cap on PEG tube broke off and he lost it.  PEG functioning well, but he has been using a rolled up tape "plug" to prevent leakage.  has no other complaints.

## 2018-06-05 PROBLEM — Z93.1 S/P PERCUTANEOUS ENDOSCOPIC GASTROSTOMY (PEG) TUBE PLACEMENT: Status: RESOLVED | Noted: 2018-06-05 | Resolved: 2018-06-05

## 2018-06-05 PROBLEM — Z86.39 HISTORY OF HYPERLIPIDEMIA: Status: RESOLVED | Noted: 2018-06-05 | Resolved: 2018-06-05

## 2018-06-07 ENCOUNTER — APPOINTMENT (OUTPATIENT)
Dept: THORACIC SURGERY | Facility: CLINIC | Age: 63
End: 2018-06-07
Payer: MEDICAID

## 2018-06-07 VITALS
TEMPERATURE: 97.2 F | SYSTOLIC BLOOD PRESSURE: 120 MMHG | HEIGHT: 66 IN | RESPIRATION RATE: 19 BRPM | DIASTOLIC BLOOD PRESSURE: 68 MMHG | WEIGHT: 130 LBS | OXYGEN SATURATION: 97 % | BODY MASS INDEX: 20.89 KG/M2 | HEART RATE: 80 BPM

## 2018-06-07 DIAGNOSIS — Z86.39 PERSONAL HISTORY OF OTHER ENDOCRINE, NUTRITIONAL AND METABOLIC DISEASE: ICD-10-CM

## 2018-06-07 DIAGNOSIS — Z93.1 GASTROSTOMY STATUS: ICD-10-CM

## 2018-06-07 PROCEDURE — 99205 OFFICE O/P NEW HI 60 MIN: CPT

## 2018-06-08 DIAGNOSIS — Z79.82 LONG TERM (CURRENT) USE OF ASPIRIN: ICD-10-CM

## 2018-06-08 DIAGNOSIS — K94.23 GASTROSTOMY MALFUNCTION: ICD-10-CM

## 2018-06-08 DIAGNOSIS — Z88.0 ALLERGY STATUS TO PENICILLIN: ICD-10-CM

## 2018-06-08 DIAGNOSIS — Z79.4 LONG TERM (CURRENT) USE OF INSULIN: ICD-10-CM

## 2018-06-08 DIAGNOSIS — Z79.899 OTHER LONG TERM (CURRENT) DRUG THERAPY: ICD-10-CM

## 2018-06-14 ENCOUNTER — FORM ENCOUNTER (OUTPATIENT)
Age: 63
End: 2018-06-14

## 2018-06-15 ENCOUNTER — OUTPATIENT (OUTPATIENT)
Dept: OUTPATIENT SERVICES | Facility: HOSPITAL | Age: 63
LOS: 1 days | End: 2018-06-15
Payer: MEDICAID

## 2018-06-15 ENCOUNTER — APPOINTMENT (OUTPATIENT)
Dept: CT IMAGING | Facility: HOSPITAL | Age: 63
End: 2018-06-15
Payer: MEDICAID

## 2018-06-15 PROCEDURE — 70498 CT ANGIOGRAPHY NECK: CPT | Mod: 26

## 2018-06-15 PROCEDURE — 70498 CT ANGIOGRAPHY NECK: CPT

## 2018-06-21 ENCOUNTER — APPOINTMENT (OUTPATIENT)
Dept: THORACIC SURGERY | Facility: CLINIC | Age: 63
End: 2018-06-21
Payer: MEDICAID

## 2018-06-24 ENCOUNTER — EMERGENCY (EMERGENCY)
Facility: HOSPITAL | Age: 63
LOS: 1 days | Discharge: HOME | End: 2018-06-24
Attending: EMERGENCY MEDICINE | Admitting: EMERGENCY MEDICINE
Payer: MEDICAID

## 2018-06-24 VITALS
TEMPERATURE: 98 F | HEART RATE: 68 BPM | SYSTOLIC BLOOD PRESSURE: 117 MMHG | HEIGHT: 66 IN | DIASTOLIC BLOOD PRESSURE: 75 MMHG | WEIGHT: 130.07 LBS | OXYGEN SATURATION: 97 % | RESPIRATION RATE: 20 BRPM

## 2018-06-24 VITALS
SYSTOLIC BLOOD PRESSURE: 116 MMHG | DIASTOLIC BLOOD PRESSURE: 77 MMHG | RESPIRATION RATE: 18 BRPM | TEMPERATURE: 98 F | HEART RATE: 68 BPM | OXYGEN SATURATION: 96 %

## 2018-06-24 DIAGNOSIS — K94.23 GASTROSTOMY MALFUNCTION: ICD-10-CM

## 2018-06-24 DIAGNOSIS — Z88.0 ALLERGY STATUS TO PENICILLIN: ICD-10-CM

## 2018-06-24 DIAGNOSIS — I10 ESSENTIAL (PRIMARY) HYPERTENSION: ICD-10-CM

## 2018-06-24 DIAGNOSIS — Z79.899 OTHER LONG TERM (CURRENT) DRUG THERAPY: ICD-10-CM

## 2018-06-24 DIAGNOSIS — E78.5 HYPERLIPIDEMIA, UNSPECIFIED: ICD-10-CM

## 2018-06-24 DIAGNOSIS — Z79.82 LONG TERM (CURRENT) USE OF ASPIRIN: ICD-10-CM

## 2018-06-24 DIAGNOSIS — Z79.4 LONG TERM (CURRENT) USE OF INSULIN: ICD-10-CM

## 2018-06-24 PROCEDURE — 99283 EMERGENCY DEPT VISIT LOW MDM: CPT | Mod: 25

## 2018-06-24 PROCEDURE — 74018 RADEX ABDOMEN 1 VIEW: CPT | Mod: 26

## 2018-06-24 PROCEDURE — L8699: CPT

## 2018-06-24 PROCEDURE — 43760: CPT

## 2018-06-24 PROCEDURE — 74018 RADEX ABDOMEN 1 VIEW: CPT

## 2018-06-24 PROCEDURE — 99284 EMERGENCY DEPT VISIT MOD MDM: CPT | Mod: 25

## 2018-06-24 RX ORDER — DIATRIZOATE MEGLUMINE 180 MG/ML
30 INJECTION, SOLUTION INTRAVESICAL ONCE
Qty: 0 | Refills: 0 | Status: COMPLETED | OUTPATIENT
Start: 2018-06-24 | End: 2018-06-24

## 2018-06-24 RX ADMIN — DIATRIZOATE MEGLUMINE 30 MILLILITER(S): 180 INJECTION, SOLUTION INTRAVESICAL at 15:42

## 2018-06-24 NOTE — ED PROVIDER NOTE - DIAGNOSTIC INTERPRETATION
ER Physician Assistant: Yo Multani PA-C  INTERPRETATION:  no acute fracture; no soft tissue swelling noted; normal bony alignment. abd peg tube in (vazquez) in place

## 2018-06-24 NOTE — ED ADULT NURSE NOTE - OBJECTIVE STATEMENT
Patient is a 63yo M, arrived to ED via walk-in, AAOx3, in NAD, vitals stable, stating that his PEG tube fell out approximately one hour PTA.  Patient denies any trauma to the site, falls, bleeding or discharge at site or any other complaints.  PEG site to LUQ clear, dry and intact.  Patient states PEG was placed due to dysphagia from past stroke.  Will continue with plan of care.

## 2018-06-24 NOTE — ED PROVIDER NOTE - ATTENDING CONTRIBUTION TO CARE
I discussed the plan of care of the patient directly with the PA while the patient was in the Emergency Department. I did not perform a face to face diagnostic evaluation on this patient. I have reviewed the ACP note and agree with the history, exam and plan of care.

## 2018-06-24 NOTE — ED PROVIDER NOTE - OBJECTIVE STATEMENT
PT TO ED  co peg tub coming out today pt has had peg for 1 year has esophageal issues no fever no dizzy no headache no chills no NVD no chest pain no SOB no shakes no aches no other  injury no other complaints

## 2018-06-24 NOTE — ED PROVIDER NOTE - MEDICAL DECISION MAKING DETAILS
attempted peg tube 20 Mongolian would not fit 18 Mongolian vazquez placed and confirmed PT to FU GI this week I have discussed the discharge plan with the patient. The patient agrees with the plan, as discussed.  The patient understands Emergency Department diagnosis is a preliminary diagnosis often based on limited information and that the patient must adhere to the follow-up plan as discussed.  The patient understands that if the symptoms worsen or if prescribed medications do not have the desired/planned effect that the patient may return to the Emergency Department at any time for further evaluation and treatment.

## 2018-06-25 ENCOUNTER — FORM ENCOUNTER (OUTPATIENT)
Age: 63
End: 2018-06-25

## 2018-06-25 ENCOUNTER — APPOINTMENT (OUTPATIENT)
Dept: CT IMAGING | Facility: HOSPITAL | Age: 63
End: 2018-06-25

## 2018-06-26 ENCOUNTER — APPOINTMENT (OUTPATIENT)
Dept: CT IMAGING | Facility: HOSPITAL | Age: 63
End: 2018-06-26
Payer: MEDICAID

## 2018-06-26 ENCOUNTER — EMERGENCY (EMERGENCY)
Facility: HOSPITAL | Age: 63
LOS: 1 days | Discharge: HOME | End: 2018-06-26
Admitting: EMERGENCY MEDICINE
Payer: MEDICAID

## 2018-06-26 ENCOUNTER — OUTPATIENT (OUTPATIENT)
Dept: OUTPATIENT SERVICES | Facility: HOSPITAL | Age: 63
LOS: 1 days | End: 2018-06-26
Payer: MEDICAID

## 2018-06-26 VITALS
DIASTOLIC BLOOD PRESSURE: 74 MMHG | SYSTOLIC BLOOD PRESSURE: 127 MMHG | WEIGHT: 130.07 LBS | HEART RATE: 68 BPM | HEIGHT: 66 IN | RESPIRATION RATE: 18 BRPM | TEMPERATURE: 98 F | OXYGEN SATURATION: 95 %

## 2018-06-26 VITALS
HEART RATE: 68 BPM | DIASTOLIC BLOOD PRESSURE: 66 MMHG | TEMPERATURE: 98 F | OXYGEN SATURATION: 96 % | RESPIRATION RATE: 18 BRPM | SYSTOLIC BLOOD PRESSURE: 125 MMHG

## 2018-06-26 DIAGNOSIS — K94.23 GASTROSTOMY MALFUNCTION: ICD-10-CM

## 2018-06-26 DIAGNOSIS — I10 ESSENTIAL (PRIMARY) HYPERTENSION: ICD-10-CM

## 2018-06-26 DIAGNOSIS — Z79.82 LONG TERM (CURRENT) USE OF ASPIRIN: ICD-10-CM

## 2018-06-26 DIAGNOSIS — E78.5 HYPERLIPIDEMIA, UNSPECIFIED: ICD-10-CM

## 2018-06-26 DIAGNOSIS — Z79.4 LONG TERM (CURRENT) USE OF INSULIN: ICD-10-CM

## 2018-06-26 DIAGNOSIS — Z79.899 OTHER LONG TERM (CURRENT) DRUG THERAPY: ICD-10-CM

## 2018-06-26 DIAGNOSIS — Z88.0 ALLERGY STATUS TO PENICILLIN: ICD-10-CM

## 2018-06-26 PROCEDURE — 75572 CT HRT W/3D IMAGE: CPT

## 2018-06-26 PROCEDURE — 99283 EMERGENCY DEPT VISIT LOW MDM: CPT | Mod: 25

## 2018-06-26 PROCEDURE — 75572 CT HRT W/3D IMAGE: CPT | Mod: 26

## 2018-06-26 NOTE — ED PROVIDER NOTE - MEDICAL DECISION MAKING DETAILS
18 FR vazquez exchange as requested + verified positioning and d/c to gi f/u in place 18 FR taylor exchange as requested + verified positioning @ xray  and d/c to gi f/u in place ( CT surgery called and todays CTA results discussed -> follow uo tomorrow Dr Presley in place)

## 2018-06-26 NOTE — ED PROVIDER NOTE - OBJECTIVE STATEMENT
Returns to ED as replacement vazquez in peg site now clogged + has GI care in place to replace peg Seeking new vazquez for interim NO pain reported Here on the 24th and vazquez placed as difficulty with PEG and patient seeks only this exchanged till GI f/u

## 2018-06-27 PROCEDURE — 99283 EMERGENCY DEPT VISIT LOW MDM: CPT

## 2018-06-27 PROCEDURE — 74019 RADEX ABDOMEN 2 VIEWS: CPT | Mod: 26

## 2018-06-27 PROCEDURE — 74019 RADEX ABDOMEN 2 VIEWS: CPT

## 2018-06-28 ENCOUNTER — APPOINTMENT (OUTPATIENT)
Dept: THORACIC SURGERY | Facility: CLINIC | Age: 63
End: 2018-06-28
Payer: MEDICAID

## 2018-06-28 VITALS
WEIGHT: 120 LBS | TEMPERATURE: 97.3 F | DIASTOLIC BLOOD PRESSURE: 66 MMHG | OXYGEN SATURATION: 97 % | SYSTOLIC BLOOD PRESSURE: 113 MMHG | RESPIRATION RATE: 18 BRPM | BODY MASS INDEX: 19.37 KG/M2 | HEART RATE: 78 BPM

## 2018-06-28 PROCEDURE — 99214 OFFICE O/P EST MOD 30 MIN: CPT

## 2018-06-29 ENCOUNTER — OUTPATIENT (OUTPATIENT)
Dept: OUTPATIENT SERVICES | Facility: HOSPITAL | Age: 63
LOS: 1 days | End: 2018-06-29
Payer: MEDICAID

## 2018-06-29 ENCOUNTER — APPOINTMENT (OUTPATIENT)
Dept: GASTROENTEROLOGY | Facility: HOSPITAL | Age: 63
End: 2018-06-29

## 2018-06-29 DIAGNOSIS — R13.10 DYSPHAGIA, UNSPECIFIED: ICD-10-CM

## 2018-06-29 PROCEDURE — 43246 EGD PLACE GASTROSTOMY TUBE: CPT | Mod: GC

## 2018-06-29 PROCEDURE — L8699: CPT

## 2018-06-29 PROCEDURE — 43245 EGD DILATE STRICTURE: CPT | Mod: 59,GC

## 2018-06-29 PROCEDURE — 43246 EGD PLACE GASTROSTOMY TUBE: CPT

## 2018-07-01 ENCOUNTER — FORM ENCOUNTER (OUTPATIENT)
Age: 63
End: 2018-07-01

## 2018-07-02 ENCOUNTER — APPOINTMENT (OUTPATIENT)
Dept: HEART AND VASCULAR | Facility: CLINIC | Age: 63
End: 2018-07-02
Payer: MEDICAID

## 2018-07-02 ENCOUNTER — OUTPATIENT (OUTPATIENT)
Dept: OUTPATIENT SERVICES | Facility: HOSPITAL | Age: 63
LOS: 1 days | End: 2018-07-02
Payer: MEDICAID

## 2018-07-02 VITALS
BODY MASS INDEX: 19.77 KG/M2 | WEIGHT: 123 LBS | OXYGEN SATURATION: 96 % | HEIGHT: 66 IN | DIASTOLIC BLOOD PRESSURE: 61 MMHG | SYSTOLIC BLOOD PRESSURE: 116 MMHG | HEART RATE: 77 BPM

## 2018-07-02 DIAGNOSIS — R63.4 ABNORMAL WEIGHT LOSS: ICD-10-CM

## 2018-07-02 DIAGNOSIS — I25.10 ATHEROSCLEROTIC HEART DISEASE OF NATIVE CORONARY ARTERY WITHOUT ANGINA PECTORIS: ICD-10-CM

## 2018-07-02 DIAGNOSIS — I25.10 ATHEROSCLEROTIC HEART DISEASE OF NATIVE CORONARY ARTERY W/OUT ANGINA PECTORIS: ICD-10-CM

## 2018-07-02 DIAGNOSIS — I69.391 DYSPHAGIA FOLLOWING CEREBRAL INFARCTION: ICD-10-CM

## 2018-07-02 DIAGNOSIS — E11.9 TYPE 2 DIABETES MELLITUS W/OUT COMPLICATIONS: ICD-10-CM

## 2018-07-02 PROCEDURE — 93306 TTE W/DOPPLER COMPLETE: CPT | Mod: 26

## 2018-07-02 PROCEDURE — 93306 TTE W/DOPPLER COMPLETE: CPT

## 2018-07-02 PROCEDURE — 99215 OFFICE O/P EST HI 40 MIN: CPT

## 2018-07-05 ENCOUNTER — APPOINTMENT (OUTPATIENT)
Dept: CARDIOTHORACIC SURGERY | Facility: CLINIC | Age: 63
End: 2018-07-05
Payer: MEDICAID

## 2018-07-05 VITALS
HEIGHT: 66 IN | TEMPERATURE: 98.1 F | OXYGEN SATURATION: 97 % | DIASTOLIC BLOOD PRESSURE: 65 MMHG | HEART RATE: 85 BPM | WEIGHT: 125 LBS | RESPIRATION RATE: 19 BRPM | SYSTOLIC BLOOD PRESSURE: 136 MMHG | BODY MASS INDEX: 20.09 KG/M2

## 2018-07-05 DIAGNOSIS — R26.9 OTHER SEQUELAE OF CEREBRAL INFARCTION: ICD-10-CM

## 2018-07-05 DIAGNOSIS — Z87.19 PERSONAL HISTORY OF OTHER DISEASES OF THE DIGESTIVE SYSTEM: ICD-10-CM

## 2018-07-05 DIAGNOSIS — R13.19 OTHER DYSPHAGIA: ICD-10-CM

## 2018-07-05 DIAGNOSIS — S09.91XA UNSPECIFIED INJURY OF EAR, INITIAL ENCOUNTER: ICD-10-CM

## 2018-07-05 DIAGNOSIS — K22.0 ACHALASIA OF CARDIA: ICD-10-CM

## 2018-07-05 DIAGNOSIS — I69.398 OTHER SEQUELAE OF CEREBRAL INFARCTION: ICD-10-CM

## 2018-07-05 LAB
25(OH)D3 SERPL-MCNC: 23.2 NG/ML
ALBUMIN SERPL ELPH-MCNC: 3.8 G/DL
ALP BLD-CCNC: 66 U/L
ALT SERPL-CCNC: 14 U/L
ANION GAP SERPL CALC-SCNC: 14 MMOL/L
AST SERPL-CCNC: 20 U/L
BILIRUB SERPL-MCNC: 0.3 MG/DL
BUN SERPL-MCNC: 24 MG/DL
CALCIUM SERPL-MCNC: 8.8 MG/DL
CHLORIDE SERPL-SCNC: 96 MMOL/L
CHOLEST SERPL-MCNC: 113 MG/DL
CHOLEST/HDLC SERPL: 2.2 RATIO
CO2 SERPL-SCNC: 31 MMOL/L
CREAT SERPL-MCNC: 0.8 MG/DL
GLUCOSE SERPL-MCNC: 146 MG/DL
HBA1C MFR BLD HPLC: 6.6 %
HDLC SERPL-MCNC: 52 MG/DL
LDLC SERPL CALC-MCNC: 33 MG/DL
POTASSIUM SERPL-SCNC: 4.8 MMOL/L
PROT SERPL-MCNC: 6.3 G/DL
SODIUM SERPL-SCNC: 141 MMOL/L
TRIGL SERPL-MCNC: 138 MG/DL
TSH SERPL-ACNC: 3.16 UIU/ML
VIT B12 SERPL-MCNC: 975 PG/ML

## 2018-07-05 PROCEDURE — 99211 OFF/OP EST MAY X REQ PHY/QHP: CPT

## 2018-07-05 RX ORDER — OMEPRAZOLE 10 MG/1
1 CAPSULE, DELAYED RELEASE ORAL
Qty: 0 | Refills: 0 | COMMUNITY

## 2018-07-05 RX ORDER — AMLODIPINE BESYLATE 2.5 MG/1
0 TABLET ORAL
Qty: 0 | Refills: 0 | COMMUNITY

## 2018-07-05 RX ORDER — CHLORHEXIDINE GLUCONATE 213 G/1000ML
1 SOLUTION TOPICAL ONCE
Qty: 0 | Refills: 0 | Status: DISCONTINUED | OUTPATIENT
Start: 2018-07-06 | End: 2018-07-06

## 2018-07-05 RX ORDER — INSULIN GLARGINE 100 [IU]/ML
10 INJECTION, SOLUTION SUBCUTANEOUS
Qty: 0 | Refills: 0 | COMMUNITY

## 2018-07-05 NOTE — H&P ADULT - HISTORY OF PRESENT ILLNESS
SKELETON    61 yo male, PMHx HTN, HLD, IDDM type 2, h/o CVA with left sided hemiparesis s/p botox injection, POEM (peroral endoscopic myotomy with Dr Kam), and PEG, Carotid artery disease with LICA 67% blockage and occluded left vertebral artery ( presents to cardiologist for routine follow up. Pt denies chest pain, SOB, dizziness, palpitations, nausea. Echocardiogram 7/2/18 mild concentric LVH. Normal LV wall motion. EF 65%. Mild AS. Trace MR. PASP 30. CTA coronaries 6/26/18 revealing severe pRCA, mod mLAD, and dense calcium in D1, OM1, and OM3 to preclude evaluation. Study also showed aberrant right subclavian artery originating from midpoint of aortic arch and mild focal ectasia of posterior aortic arch measuring 3.4cm; no dissection.     In light of pt’s risk factors and abnormal CTA coronaries pt now presents for recommended cardiac catheterization with possible intervention. SKELETON    61 yo male, PMHx HTN, HLD, IDDM type 2, h/o CVA with left sided hemiparesis s/p botox injection, POEM (peroral endoscopic myotomy with Dr Kam), and PEG initially placed 5/2017 and replaced at [facility] on 6/29/18, Carotid artery disease with LICA 67% blockage and occluded left vertebral artery ( presents to cardiologist for routine follow up. Pt denies chest pain, SOB, dizziness, palpitations, nausea. Echocardiogram 7/2/18 mild concentric LVH. Normal LV wall motion. EF 65%. Mild AS. Trace MR. PASP 30. CTA coronaries 6/26/18 revealing severe pRCA, mod mLAD, and dense calcium in D1, OM1, and OM3 to preclude evaluation. Study also showed aberrant right subclavian artery originating from midpoint of aortic arch and mild focal ectasia of posterior aortic arch measuring 3.4cm; no dissection.     In light of pt’s risk factors and abnormal CTA coronaries pt now presents for recommended cardiac catheterization with possible intervention. History obtained from pt's wife - reliable historian    Pt needs right subclavian artery assessment. It's aberrant and comes off the mid aortic arch and could be contributing to pt's dysphasia.     61 yo male, former smoker, PMHx HTN, HLD, IDDM type 2, h/o CVA with left sided hemiparesis s/p botox injection, POEM (peroral endoscopic myotomy with Dr Kam), and PEG initially placed 5/2017 and replaced at [facility] on 6/29/18, Carotid artery disease with LICA 67% blockage and occluded left vertebral artery presents to cardiologist for routine follow up. Pt has known aberrant right subclavian artery originating from midpoint of aortic arch which is pressing on his esophagus, possibly worsening his dysphasia. Pt may need CTS with Dr Kam depending on a cardiac catheterization to assess the vasculature more closely. His surgery is not yet scheduled. Pt denies chest pain, SOB, dizziness, palpitations, nausea. Echocardiogram 7/2/18 mild concentric LVH. Normal LV wall motion. EF 65%. Mild AS. Trace MR. PASP 30. CTA coronaries 6/26/18 revealing severe pRCA, mod mLAD, and dense calcium in D1, OM1, and OM3 to preclude evaluation. Study also showed aberrant right subclavian artery originating from midpoint of aortic arch and mild focal ectasia of posterior aortic arch measuring 3.4cm; no dissection.     In light of pt’s risk factors and abnormal CTA coronaries pt now presents for recommended cardiac catheterization with possible intervention to assess vasculature prior to potential surgery with Dr. Kam. History obtained from pt's wife - reliable historian    Pt needs right subclavian artery assessment. It's aberrant and comes off the mid aortic arch and could be contributing to pt's dysphasia.     63 yo male, former smoker, PMHx HTN, HLD, NIDDM type 2, h/o CVA with left sided hemiparesis s/p botox injection, POEM (peroral endoscopic myotomy with Dr Kam), and PEG initially placed 5/2017 and replaced at [facility] on 6/29/18, Carotid artery disease with LICA 67% blockage and occluded left vertebral artery presents to cardiologist for routine follow up. Pt has known aberrant right subclavian artery originating from midpoint of aortic arch which is pressing on his esophagus, possibly worsening his dysphasia. Pt may need CTS with Dr Kam depending on a cardiac catheterization to assess the vasculature more closely. His surgery is not yet scheduled. Pt denies chest pain, SOB, dizziness, palpitations, nausea. Echocardiogram 7/2/18 mild concentric LVH. Normal LV wall motion. EF 65%. Mild AS. Trace MR. PASP 30. CTA coronaries 6/26/18 revealing severe pRCA, mod mLAD, and dense calcium in D1, OM1, and OM3 to preclude evaluation. Study also showed aberrant right subclavian artery originating from midpoint of aortic arch and mild focal ectasia of posterior aortic arch measuring 3.4cm; no dissection.     In light of pt’s risk factors and abnormal CTA coronaries pt now presents for recommended cardiac catheterization with possible intervention to assess vasculature prior to potential surgery with Dr. Kam.

## 2018-07-05 NOTE — H&P ADULT - ASSESSMENT
63 yo male, former smoker, PMHx HTN, HLD, NIDDM type 2, h/o CVA with left sided hemiparesis s/p botox injection, POEM (peroral endoscopic myotomy with Dr Kam), and PEG initially placed 5/2017 and replaced at [facility] on 6/29/18, Carotid artery disease with LICA 67% blockage and occluded left vertebral artery presents to cardiologist for routine follow up. Pt has known aberrant right subclavian artery originating from midpoint of aortic arch which is pressing on his esophagus, possibly worsening his dysphasia. Pt may need CTS with Dr Kam depending on a cardiac catheterization to assess the vasculature more closely. His surgery is not yet scheduled. Pt denies chest pain, SOB, dizziness, palpitations, nausea. Echocardiogram 7/2/18 mild concentric LVH. Normal LV wall motion. EF 65%. Mild AS. Trace MR. PASP 30. CTA coronaries 6/26/18 revealing severe pRCA, mod mLAD, and dense calcium in D1, OM1, and OM3 to preclude evaluation. Study also showed aberrant right subclavian artery originating from midpoint of aortic arch and mild focal ectasia of posterior aortic arch measuring 3.4cm; no dissection. In light of pt’s risk factors and abnormal CTA coronaries pt now presents for recommended cardiac catheterization with possible intervention to assess vasculature prior to potential surgery with Dr. Kam.     Pt's last dose of Aspirin 81mg was on 7/5/18. Mallampati III (pt has severe dysphagia). ASA III.

## 2018-07-06 ENCOUNTER — OUTPATIENT (OUTPATIENT)
Dept: OUTPATIENT SERVICES | Facility: HOSPITAL | Age: 63
LOS: 1 days | Discharge: NURSING, REHAB OR LONG-TERM CARE | End: 2018-07-06
Payer: MEDICAID

## 2018-07-06 LAB
ALBUMIN SERPL ELPH-MCNC: 3.9 G/DL — SIGNIFICANT CHANGE UP (ref 3.3–5)
ALP SERPL-CCNC: 53 U/L — SIGNIFICANT CHANGE UP (ref 40–120)
ALT FLD-CCNC: 14 U/L — SIGNIFICANT CHANGE UP (ref 10–45)
ANION GAP SERPL CALC-SCNC: 8 MMOL/L — SIGNIFICANT CHANGE UP (ref 5–17)
APTT BLD: 29.9 SEC — SIGNIFICANT CHANGE UP (ref 27.5–37.4)
AST SERPL-CCNC: 21 U/L — SIGNIFICANT CHANGE UP (ref 10–40)
BASOPHILS NFR BLD AUTO: 0.4 % — SIGNIFICANT CHANGE UP (ref 0–2)
BILIRUB SERPL-MCNC: 0.2 MG/DL — SIGNIFICANT CHANGE UP (ref 0.2–1.2)
BUN SERPL-MCNC: 27 MG/DL — HIGH (ref 7–23)
CALCIUM SERPL-MCNC: 8.9 MG/DL — SIGNIFICANT CHANGE UP (ref 8.4–10.5)
CHLORIDE SERPL-SCNC: 95 MMOL/L — LOW (ref 96–108)
CHOLEST SERPL-MCNC: 138 MG/DL — SIGNIFICANT CHANGE UP (ref 10–199)
CK MB CFR SERPL CALC: 5 NG/ML — SIGNIFICANT CHANGE UP (ref 0–6.7)
CK SERPL-CCNC: 147 U/L — SIGNIFICANT CHANGE UP (ref 30–200)
CO2 SERPL-SCNC: 33 MMOL/L — HIGH (ref 22–31)
CREAT SERPL-MCNC: 0.66 MG/DL — SIGNIFICANT CHANGE UP (ref 0.5–1.3)
EOSINOPHIL NFR BLD AUTO: 1.5 % — SIGNIFICANT CHANGE UP (ref 0–6)
GLUCOSE SERPL-MCNC: 135 MG/DL — HIGH (ref 70–99)
HBA1C BLD-MCNC: 6.4 % — HIGH (ref 4–5.6)
HCT VFR BLD CALC: 36.4 % — LOW (ref 39–50)
HDLC SERPL-MCNC: 51 MG/DL — SIGNIFICANT CHANGE UP (ref 40–125)
HGB BLD-MCNC: 12 G/DL — LOW (ref 13–17)
INR BLD: 1.09 — SIGNIFICANT CHANGE UP (ref 0.88–1.16)
LIPID PNL WITH DIRECT LDL SERPL: 49 MG/DL — SIGNIFICANT CHANGE UP
LYMPHOCYTES # BLD AUTO: 20.9 % — SIGNIFICANT CHANGE UP (ref 13–44)
MCHC RBC-ENTMCNC: 32.4 PG — SIGNIFICANT CHANGE UP (ref 27–34)
MCHC RBC-ENTMCNC: 33 G/DL — SIGNIFICANT CHANGE UP (ref 32–36)
MCV RBC AUTO: 98.4 FL — SIGNIFICANT CHANGE UP (ref 80–100)
MONOCYTES NFR BLD AUTO: 6.1 % — SIGNIFICANT CHANGE UP (ref 2–14)
NEUTROPHILS NFR BLD AUTO: 71.1 % — SIGNIFICANT CHANGE UP (ref 43–77)
PLATELET # BLD AUTO: 267 K/UL — SIGNIFICANT CHANGE UP (ref 150–400)
POTASSIUM SERPL-MCNC: 4.6 MMOL/L — SIGNIFICANT CHANGE UP (ref 3.5–5.3)
POTASSIUM SERPL-SCNC: 4.6 MMOL/L — SIGNIFICANT CHANGE UP (ref 3.5–5.3)
PROT SERPL-MCNC: 6.1 G/DL — SIGNIFICANT CHANGE UP (ref 6–8.3)
PROTHROM AB SERPL-ACNC: 12.1 SEC — SIGNIFICANT CHANGE UP (ref 9.8–12.7)
RBC # BLD: 3.7 M/UL — LOW (ref 4.2–5.8)
RBC # FLD: 13.9 % — SIGNIFICANT CHANGE UP (ref 10.3–16.9)
SODIUM SERPL-SCNC: 136 MMOL/L — SIGNIFICANT CHANGE UP (ref 135–145)
TOTAL CHOLESTEROL/HDL RATIO MEASUREMENT: 2.7 RATIO — LOW (ref 3.4–9.6)
TRIGL SERPL-MCNC: 191 MG/DL — HIGH (ref 10–149)
WBC # BLD: 7.5 K/UL — SIGNIFICANT CHANGE UP (ref 3.8–10.5)
WBC # FLD AUTO: 7.5 K/UL — SIGNIFICANT CHANGE UP (ref 3.8–10.5)

## 2018-07-06 PROCEDURE — 93458 L HRT ARTERY/VENTRICLE ANGIO: CPT | Mod: 26

## 2018-07-06 PROCEDURE — 93458 L HRT ARTERY/VENTRICLE ANGIO: CPT

## 2018-07-06 PROCEDURE — C1894: CPT

## 2018-07-06 PROCEDURE — 85610 PROTHROMBIN TIME: CPT

## 2018-07-06 PROCEDURE — 82550 ASSAY OF CK (CPK): CPT

## 2018-07-06 PROCEDURE — 85025 COMPLETE CBC W/AUTO DIFF WBC: CPT

## 2018-07-06 PROCEDURE — 75605 CONTRAST EXAM THORACIC AORTA: CPT | Mod: 26,59

## 2018-07-06 PROCEDURE — 82553 CREATINE MB FRACTION: CPT

## 2018-07-06 PROCEDURE — 80053 COMPREHEN METABOLIC PANEL: CPT

## 2018-07-06 PROCEDURE — 80061 LIPID PANEL: CPT

## 2018-07-06 PROCEDURE — C1760: CPT

## 2018-07-06 PROCEDURE — 83036 HEMOGLOBIN GLYCOSYLATED A1C: CPT

## 2018-07-06 PROCEDURE — 85730 THROMBOPLASTIN TIME PARTIAL: CPT

## 2018-07-06 PROCEDURE — C1769: CPT

## 2018-07-06 PROCEDURE — 93567 NJX CAR CTH SPRVLV AORTGRPHY: CPT

## 2018-07-06 PROCEDURE — C1887: CPT

## 2018-07-06 NOTE — PROGRESS NOTE ADULT - SUBJECTIVE AND OBJECTIVE BOX
Interventional Cardiology PA SDA Discharge Note    Patient without complaints. Ambulated and voided without difficulties    Afebrile, VSS    Ext:    		Right  Groin:   no  hematoma,   no  bruit, dressing; C/D/I        Pulses:    intact DP/PT to baseline     A/P:  63 yo male, former smoker, PMHx HTN, HLD, NIDDM type 2, h/o CVA with left sided hemiparesis s/p botox injection, POEM (peroral endoscopic myotomy with Dr Kam), and PEG initially placed 5/2017 and replaced at [facility] on 6/29/18, Carotid artery disease with LICA 67% blockage and occluded left vertebral artery presents to cardiologist for routine follow up. Pt has known aberrant right subclavian artery originating from midpoint of aortic arch which is pressing on his esophagus, possibly worsening his dysphasia. Pt may need CTS with Dr Kam depending on a cardiac catheterization to assess the vasculature more closely. His surgery is not yet scheduled. Pt denies chest pain, SOB, dizziness, palpitations, nausea. Echocardiogram 7/2/18 mild concentric LVH. Normal LV wall motion. EF 65%. Mild AS. Trace MR. PASP 30. CTA coronaries 6/26/18 revealing severe pRCA, mod mLAD, and dense calcium in D1, OM1, and OM3 to preclude evaluation. Study also showed aberrant right subclavian artery originating from midpoint of aortic arch and mild focal ectasia of posterior aortic arch measuring 3.4cm; no dissection. In light of pt’s risk factors and abnormal CTA coronaries pt now presents for recommended cardiac catheterization with possible intervention to assess vasculature prior to potential surgery with Dr. Kam.     Pt s/p diagnostic cardiac cath on 7/6/18 showing triple vessel disease. As per Dr Bella pt will have open heart surgery with Dr Boyd. Pt will follow up with Dr. Boyd in 1-2 weeks. Pt had a failed right femoral artery perclose and hemostasis was achieved after 20 minutes of manual compression. Dr. Bella wants the interventional cardiology fellow to evaluate the pt prior to discharge and pt can go home at 8pm but is not allowed to walk home.       1.	Stable for discharge as per attending Dr. Bella after bed rest, pt voids, groin/wrist stable and 30 minutes of ambulation.  2.	Follow-up with PMD/Cardiologist Dr Boyd in 1-2 weeks  3.	Discharged forms signed and copies in chart Interventional Cardiology PA SDA Discharge Note    Patient without complaints. Ambulated and voided without difficulties    Afebrile, VSS    Ext:    		Right  Groin:   no  hematoma,   no  bruit, dressing; C/D/I        Pulses:    intact DP/PT to baseline     A/P:  61 yo male, former smoker, PMHx HTN, HLD, NIDDM type 2, h/o CVA with left sided hemiparesis s/p botox injection, POEM (peroral endoscopic myotomy with Dr Kam), and PEG initially placed 5/2017 and replaced at [facility] on 6/29/18, Carotid artery disease with LICA 67% blockage and occluded left vertebral artery presents to cardiologist for routine follow up. Pt has known aberrant right subclavian artery originating from midpoint of aortic arch which is pressing on his esophagus, possibly worsening his dysphasia. Pt may need CTS with Dr Kam depending on a cardiac catheterization to assess the vasculature more closely. His surgery is not yet scheduled. Pt denies chest pain, SOB, dizziness, palpitations, nausea. Echocardiogram 7/2/18 mild concentric LVH. Normal LV wall motion. EF 65%. Mild AS. Trace MR. PASP 30. CTA coronaries 6/26/18 revealing severe pRCA, mod mLAD, and dense calcium in D1, OM1, and OM3 to preclude evaluation. Study also showed aberrant right subclavian artery originating from midpoint of aortic arch and mild focal ectasia of posterior aortic arch measuring 3.4cm; no dissection. In light of pt’s risk factors and abnormal CTA coronaries pt now presents for recommended cardiac catheterization with possible intervention to assess vasculature prior to potential surgery with Dr. Kam.     Pt s/p diagnostic cardiac cath on 7/6/18 showing LM normal. Significant LAD, OM1 and subtotal RCA lesions. Minimal stenosis in anomalous right subclavian artery. EF normal. As per Dr Bella pt will have open heart surgery with Dr Boyd. Pt will follow up with Dr. Boyd in 1-2 weeks. Pt had a failed right femoral artery perclose and hemostasis was achieved after 20 minutes of manual compression. Dr. Bella wants the interventional cardiology fellow to evaluate the pt prior to discharge and pt can go home at 8pm but is not allowed to walk home.       1.	Stable for discharge as per attending Dr. Bella after bed rest, pt voids, groin/wrist stable and 30 minutes of ambulation.  2.	Follow-up with PMD/Cardiologist Dr Boyd in 1-2 weeks  3.	Discharged forms signed and copies in chart Interventional Cardiology PA SDA Discharge Note    Patient without complaints. Ambulated and voided without difficulties    Afebrile, VSS    Ext:    		Right  Groin:   no  hematoma,   no  bruit, dressing; C/D/I        Pulses:    intact DP/PT to baseline     A/P:  61 yo male, former smoker, PMHx HTN, HLD, NIDDM type 2, h/o CVA with left sided hemiparesis s/p botox injection, POEM (peroral endoscopic myotomy with Dr Kam), and PEG initially placed 5/2017 and replaced at [facility] on 6/29/18, Carotid artery disease with LICA 67% blockage and occluded left vertebral artery presents to cardiologist for routine follow up. Pt has known aberrant right subclavian artery originating from midpoint of aortic arch which is pressing on his esophagus, possibly worsening his dysphasia. Pt may need CTS with Dr Kam depending on a cardiac catheterization to assess the vasculature more closely. His surgery is not yet scheduled. Pt denies chest pain, SOB, dizziness, palpitations, nausea. Echocardiogram 7/2/18 mild concentric LVH. Normal LV wall motion. EF 65%. Mild AS. Trace MR. PASP 30. CTA coronaries 6/26/18 revealing severe pRCA, mod mLAD, and dense calcium in D1, OM1, and OM3 to preclude evaluation. Study also showed aberrant right subclavian artery originating from midpoint of aortic arch and mild focal ectasia of posterior aortic arch measuring 3.4cm; no dissection. In light of pt’s risk factors and abnormal CTA coronaries pt now presents for recommended cardiac catheterization with possible intervention to assess vasculature prior to potential surgery with Dr. Kam.     Pt s/p diagnostic cardiac cath on 7/6/18 showing LM normal. Significant LAD, OM1 and subtotal RCA lesions. Minimal stenosis in anomalous right subclavian artery. EF normal. As per Dr Bella pt will have open heart surgery with Dr Boyd. Pt will follow up with Dr. Boyd in 1-2 weeks. Pt had a failed right femoral artery perclose and hemostasis was achieved after 20 minutes of manual compression. Dr. Bella wants the interventional cardiology fellow to evaluate the pt prior to discharge and pt can go home at 8pm but is not allowed to walk home.   update: pt. evaluated by Interventional cardiology fellow Joel who states that pt. groin is stable and he can be d/c home.     1.	Stable for discharge as per attending Dr. Bella after bed rest, pt voids, groin/wrist stable and 30 minutes of ambulation.  2.	Follow-up with PMD/Cardiologist Dr Boyd in 1-2 weeks  3.	Discharged forms signed and copies in chart

## 2018-07-09 ENCOUNTER — INPATIENT (INPATIENT)
Facility: HOSPITAL | Age: 63
LOS: 8 days | Discharge: NURSING, REHAB OR LONG-TERM CARE | DRG: 235 | End: 2018-07-18
Attending: THORACIC SURGERY (CARDIOTHORACIC VASCULAR SURGERY) | Admitting: THORACIC SURGERY (CARDIOTHORACIC VASCULAR SURGERY)
Payer: MEDICAID

## 2018-07-09 ENCOUNTER — APPOINTMENT (OUTPATIENT)
Dept: INTERNAL MEDICINE | Facility: CLINIC | Age: 63
End: 2018-07-09

## 2018-07-09 VITALS
HEART RATE: 62 BPM | OXYGEN SATURATION: 98 % | DIASTOLIC BLOOD PRESSURE: 53 MMHG | RESPIRATION RATE: 16 BRPM | TEMPERATURE: 98 F | SYSTOLIC BLOOD PRESSURE: 110 MMHG

## 2018-07-09 DIAGNOSIS — Z98.890 OTHER SPECIFIED POSTPROCEDURAL STATES: Chronic | ICD-10-CM

## 2018-07-09 DIAGNOSIS — Z09 ENCOUNTER FOR FOLLOW-UP EXAMINATION AFTER COMPLETED TREATMENT FOR CONDITIONS OTHER THAN MALIGNANT NEOPLASM: Chronic | ICD-10-CM

## 2018-07-09 DIAGNOSIS — Z98.49 CATARACT EXTRACTION STATUS, UNSPECIFIED EYE: Chronic | ICD-10-CM

## 2018-07-09 DIAGNOSIS — I25.10 ATHEROSCLEROTIC HEART DISEASE OF NATIVE CORONARY ARTERY WITHOUT ANGINA PECTORIS: ICD-10-CM

## 2018-07-09 DIAGNOSIS — R13.10 DYSPHAGIA, UNSPECIFIED: ICD-10-CM

## 2018-07-09 DIAGNOSIS — I10 ESSENTIAL (PRIMARY) HYPERTENSION: ICD-10-CM

## 2018-07-09 PROBLEM — S09.91XA INJURY OF EAR CANAL, INITIAL ENCOUNTER: Status: ACTIVE | Noted: 2017-09-12

## 2018-07-09 PROBLEM — K22.0 ACHALASIA, ESOPHAGEAL: Status: ACTIVE | Noted: 2018-07-09

## 2018-07-09 PROBLEM — I69.398 GAIT DISTURBANCE, POST-STROKE: Status: ACTIVE | Noted: 2018-01-04

## 2018-07-09 PROBLEM — Z87.19 HISTORY OF DYSPHAGIA: Status: RESOLVED | Noted: 2017-09-12 | Resolved: 2018-07-09

## 2018-07-09 PROBLEM — R13.19 CERVICAL DYSPHAGIA: Status: ACTIVE | Noted: 2018-06-07

## 2018-07-09 LAB
ALBUMIN SERPL ELPH-MCNC: 3.9 G/DL — SIGNIFICANT CHANGE UP (ref 3.3–5)
ALP SERPL-CCNC: 57 U/L — SIGNIFICANT CHANGE UP (ref 40–120)
ALT FLD-CCNC: 12 U/L — SIGNIFICANT CHANGE UP (ref 10–45)
ANION GAP SERPL CALC-SCNC: 9 MMOL/L — SIGNIFICANT CHANGE UP (ref 5–17)
APPEARANCE UR: ABNORMAL
APTT BLD: 31.9 SEC — SIGNIFICANT CHANGE UP (ref 27.5–37.4)
AST SERPL-CCNC: 19 U/L — SIGNIFICANT CHANGE UP (ref 10–40)
BASOPHILS NFR BLD AUTO: 0.4 % — SIGNIFICANT CHANGE UP (ref 0–2)
BILIRUB SERPL-MCNC: 0.2 MG/DL — SIGNIFICANT CHANGE UP (ref 0.2–1.2)
BILIRUB UR-MCNC: NEGATIVE — SIGNIFICANT CHANGE UP
BLD GP AB SCN SERPL QL: NEGATIVE — SIGNIFICANT CHANGE UP
BLD GP AB SCN SERPL QL: NEGATIVE — SIGNIFICANT CHANGE UP
BUN SERPL-MCNC: 26 MG/DL — HIGH (ref 7–23)
CALCIUM SERPL-MCNC: 8.9 MG/DL — SIGNIFICANT CHANGE UP (ref 8.4–10.5)
CHLORIDE SERPL-SCNC: 98 MMOL/L — SIGNIFICANT CHANGE UP (ref 96–108)
CHOLEST SERPL-MCNC: 140 MG/DL — SIGNIFICANT CHANGE UP (ref 10–199)
CK MB CFR SERPL CALC: 4.2 NG/ML — SIGNIFICANT CHANGE UP (ref 0–6.7)
CK SERPL-CCNC: 162 U/L — SIGNIFICANT CHANGE UP (ref 30–200)
CO2 SERPL-SCNC: 35 MMOL/L — HIGH (ref 22–31)
COLOR SPEC: YELLOW — SIGNIFICANT CHANGE UP
CREAT SERPL-MCNC: 0.64 MG/DL — SIGNIFICANT CHANGE UP (ref 0.5–1.3)
DIFF PNL FLD: NEGATIVE — SIGNIFICANT CHANGE UP
EOSINOPHIL NFR BLD AUTO: 1.7 % — SIGNIFICANT CHANGE UP (ref 0–6)
GLUCOSE SERPL-MCNC: 154 MG/DL — HIGH (ref 70–99)
GLUCOSE UR QL: NEGATIVE — SIGNIFICANT CHANGE UP
HCT VFR BLD CALC: 35 % — LOW (ref 39–50)
HDLC SERPL-MCNC: 63 MG/DL — SIGNIFICANT CHANGE UP (ref 40–125)
HGB BLD-MCNC: 11.2 G/DL — LOW (ref 13–17)
INR BLD: 1.05 — SIGNIFICANT CHANGE UP (ref 0.88–1.16)
KETONES UR-MCNC: NEGATIVE — SIGNIFICANT CHANGE UP
LEUKOCYTE ESTERASE UR-ACNC: NEGATIVE — SIGNIFICANT CHANGE UP
LIPID PNL WITH DIRECT LDL SERPL: 61 MG/DL — SIGNIFICANT CHANGE UP
LYMPHOCYTES # BLD AUTO: 17.5 % — SIGNIFICANT CHANGE UP (ref 13–44)
MAGNESIUM SERPL-MCNC: 2.4 MG/DL — SIGNIFICANT CHANGE UP (ref 1.6–2.6)
MCHC RBC-ENTMCNC: 31.9 PG — SIGNIFICANT CHANGE UP (ref 27–34)
MCHC RBC-ENTMCNC: 32 G/DL — SIGNIFICANT CHANGE UP (ref 32–36)
MCV RBC AUTO: 99.7 FL — SIGNIFICANT CHANGE UP (ref 80–100)
MONOCYTES NFR BLD AUTO: 6.5 % — SIGNIFICANT CHANGE UP (ref 2–14)
NEUTROPHILS NFR BLD AUTO: 73.9 % — SIGNIFICANT CHANGE UP (ref 43–77)
NITRITE UR-MCNC: NEGATIVE — SIGNIFICANT CHANGE UP
NT-PROBNP SERPL-SCNC: 239 PG/ML — SIGNIFICANT CHANGE UP (ref 0–300)
PH UR: 8 — SIGNIFICANT CHANGE UP (ref 5–8)
PLATELET # BLD AUTO: 262 K/UL — SIGNIFICANT CHANGE UP (ref 150–400)
POTASSIUM SERPL-MCNC: 4.8 MMOL/L — SIGNIFICANT CHANGE UP (ref 3.5–5.3)
POTASSIUM SERPL-SCNC: 4.8 MMOL/L — SIGNIFICANT CHANGE UP (ref 3.5–5.3)
PROT SERPL-MCNC: 6.7 G/DL — SIGNIFICANT CHANGE UP (ref 6–8.3)
PROT UR-MCNC: NEGATIVE MG/DL — SIGNIFICANT CHANGE UP
PROTHROM AB SERPL-ACNC: 11.7 SEC — SIGNIFICANT CHANGE UP (ref 9.8–12.7)
RBC # BLD: 3.51 M/UL — LOW (ref 4.2–5.8)
RBC # FLD: 14.2 % — SIGNIFICANT CHANGE UP (ref 10.3–16.9)
RH IG SCN BLD-IMP: POSITIVE — SIGNIFICANT CHANGE UP
RH IG SCN BLD-IMP: POSITIVE — SIGNIFICANT CHANGE UP
SODIUM SERPL-SCNC: 142 MMOL/L — SIGNIFICANT CHANGE UP (ref 135–145)
SP GR SPEC: 1.01 — SIGNIFICANT CHANGE UP (ref 1–1.03)
TOTAL CHOLESTEROL/HDL RATIO MEASUREMENT: 2.2 RATIO — LOW (ref 3.4–9.6)
TRIGL SERPL-MCNC: 81 MG/DL — SIGNIFICANT CHANGE UP (ref 10–149)
TROPONIN T SERPL-MCNC: <0.01 NG/ML — SIGNIFICANT CHANGE UP (ref 0–0.01)
TSH SERPL-MCNC: 2.13 UIU/ML — SIGNIFICANT CHANGE UP (ref 0.35–4.94)
UROBILINOGEN FLD QL: 0.2 E.U./DL — SIGNIFICANT CHANGE UP
WBC # BLD: 7.1 K/UL — SIGNIFICANT CHANGE UP (ref 3.8–10.5)
WBC # FLD AUTO: 7.1 K/UL — SIGNIFICANT CHANGE UP (ref 3.8–10.5)

## 2018-07-09 PROCEDURE — 70450 CT HEAD/BRAIN W/O DYE: CPT | Mod: 26

## 2018-07-09 PROCEDURE — 94010 BREATHING CAPACITY TEST: CPT | Mod: 26

## 2018-07-09 PROCEDURE — 99222 1ST HOSP IP/OBS MODERATE 55: CPT

## 2018-07-09 PROCEDURE — 71045 X-RAY EXAM CHEST 1 VIEW: CPT | Mod: 26

## 2018-07-09 PROCEDURE — 93880 EXTRACRANIAL BILAT STUDY: CPT | Mod: 26

## 2018-07-09 RX ORDER — CHLORHEXIDINE GLUCONATE 213 G/1000ML
1 SOLUTION TOPICAL ONCE
Qty: 0 | Refills: 0 | Status: COMPLETED | OUTPATIENT
Start: 2018-07-09 | End: 2018-07-09

## 2018-07-09 RX ORDER — HEPARIN SODIUM 5000 [USP'U]/ML
5000 INJECTION INTRAVENOUS; SUBCUTANEOUS EVERY 8 HOURS
Qty: 0 | Refills: 0 | Status: DISCONTINUED | OUTPATIENT
Start: 2018-07-09 | End: 2018-07-10

## 2018-07-09 RX ORDER — ASPIRIN/CALCIUM CARB/MAGNESIUM 324 MG
81 TABLET ORAL DAILY
Qty: 0 | Refills: 0 | Status: DISCONTINUED | OUTPATIENT
Start: 2018-07-09 | End: 2018-07-10

## 2018-07-09 RX ORDER — CHLORHEXIDINE GLUCONATE 213 G/1000ML
10 SOLUTION TOPICAL ONCE
Qty: 0 | Refills: 0 | Status: COMPLETED | OUTPATIENT
Start: 2018-07-09 | End: 2018-07-10

## 2018-07-09 RX ORDER — METOPROLOL TARTRATE 50 MG
12.5 TABLET ORAL DAILY
Qty: 0 | Refills: 0 | Status: DISCONTINUED | OUTPATIENT
Start: 2018-07-09 | End: 2018-07-10

## 2018-07-09 RX ORDER — REPAGLINIDE 1 MG/1
1 TABLET ORAL THREE TIMES A DAY
Qty: 0 | Refills: 0 | Status: DISCONTINUED | OUTPATIENT
Start: 2018-07-09 | End: 2018-07-11

## 2018-07-09 RX ORDER — CHLORHEXIDINE GLUCONATE 213 G/1000ML
1 SOLUTION TOPICAL ONCE
Qty: 0 | Refills: 0 | Status: COMPLETED | OUTPATIENT
Start: 2018-07-10 | End: 2018-07-10

## 2018-07-09 RX ORDER — PANTOPRAZOLE SODIUM 20 MG/1
40 TABLET, DELAYED RELEASE ORAL
Qty: 0 | Refills: 0 | Status: DISCONTINUED | OUTPATIENT
Start: 2018-07-09 | End: 2018-07-10

## 2018-07-09 RX ORDER — SODIUM CHLORIDE 9 MG/ML
3 INJECTION INTRAMUSCULAR; INTRAVENOUS; SUBCUTANEOUS EVERY 8 HOURS
Qty: 0 | Refills: 0 | Status: DISCONTINUED | OUTPATIENT
Start: 2018-07-09 | End: 2018-07-10

## 2018-07-09 RX ORDER — DOCUSATE SODIUM 100 MG
100 CAPSULE ORAL
Qty: 0 | Refills: 0 | Status: DISCONTINUED | OUTPATIENT
Start: 2018-07-09 | End: 2018-07-10

## 2018-07-09 RX ORDER — ATORVASTATIN CALCIUM 80 MG/1
80 TABLET, FILM COATED ORAL AT BEDTIME
Qty: 0 | Refills: 0 | Status: DISCONTINUED | OUTPATIENT
Start: 2018-07-09 | End: 2018-07-18

## 2018-07-09 RX ORDER — SENNA PLUS 8.6 MG/1
2 TABLET ORAL AT BEDTIME
Qty: 0 | Refills: 0 | Status: DISCONTINUED | OUTPATIENT
Start: 2018-07-09 | End: 2018-07-10

## 2018-07-09 RX ADMIN — CHLORHEXIDINE GLUCONATE 1 APPLICATION(S): 213 SOLUTION TOPICAL at 22:15

## 2018-07-09 RX ADMIN — REPAGLINIDE 1 MILLIGRAM(S): 1 TABLET ORAL at 23:04

## 2018-07-09 RX ADMIN — Medication 81 MILLIGRAM(S): at 22:15

## 2018-07-09 RX ADMIN — ATORVASTATIN CALCIUM 80 MILLIGRAM(S): 80 TABLET, FILM COATED ORAL at 22:15

## 2018-07-09 RX ADMIN — HEPARIN SODIUM 5000 UNIT(S): 5000 INJECTION INTRAVENOUS; SUBCUTANEOUS at 22:16

## 2018-07-09 NOTE — PROGRESS NOTE ADULT - SUBJECTIVE AND OBJECTIVE BOX
Planned Date of Surgery: 7/10/18                                                                                                           Surgeon: Dr. 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 Patient lost 110 pounds since his stroke and has been evaluated by various specialists including [facility]- Dr. George, neuro- Dr. Howell, Gi - Dr. Rosenberg and Dr. Sood and thoracic surgery - Dr. Kam. The patient was recommended to Dr. Boyd for consideration of resection of the aberrant subclavian artery, which appears to be causing an anatomic obstruction of the esophagus with proximal dilatation resulting in dysphagia, with concomitant carotid subclavian bypass. The patient presents to [facility] for surgical intervention with Dr. Boyd planned for 7/10/18. Patient denies any swallowing problems in his childhood or prior to CVA. He now reports inability to enjoy life secondary to dysphagia and achalasia. He is now s/p POEM on 2018 and Botox injections to [facility] 2018 with no relief of symptoms. Patient is in no acute distress, denies recent hospitalization, ER visits or surgeries. Denies chest pain, SOB, palpitations, hemopytsis, N/V/D, abdominal pain, dizziness, fever or chills. Patient is ambulating with a cane.       PAST MEDICAL & SURGICAL HISTORY:  Dysphagia  HLD (hyperlipidemia)  Stroke  Diabetes  HTN (hypertension)  H/O hernia repair: At age 6 months.  S/P cataract surgery  S/P Botox injection: to [facility]      penicillin (Unknown)  penicillins (Unknown)      MEDICATIONS  (STANDING):  aspirin  chewable 81 milliGRAM(s) Oral daily  atorvastatin 80 milliGRAM(s) Oral at bedtime  chlorhexidine 0.12% Liquid 10 milliLiter(s) Swish and Spit once  chlorhexidine 4% Liquid 1 Application(s) Topical once  chlorhexidine 4% Liquid 1 Application(s) Topical once  docusate sodium 100 milliGRAM(s) Oral two times a day  heparin  Injectable 5000 Unit(s) SubCutaneous every 8 hours  pantoprazole    Tablet 40 milliGRAM(s) Oral before breakfast  repaglinide 1 milliGRAM(s) Oral three times a day  senna 2 Tablet(s) Oral at bedtime  sodium chloride 0.9% lock flush 3 milliLiter(s) IV Push every 8 hours    MEDICATIONS  (PRN):      On Beta Blocker? YES     Labs:                        11.2   7.1   )-----------( 262      ( 2018 15:48 )             35.0         142  |  98  |  26<H>  ----------------------------<  154<H>  4.8   |  35<H>  |  0.64    Ca    8.9      2018 15:48  Mg     2.4         TPro  6.7  /  Alb  3.9  /  TBili  0.2  /  DBili  x   /  AST  19  /  ALT  12  /  AlkPhos  57      PT/INR - ( 2018 15:48 )   PT: 11.7 sec;   INR: 1.05          PTT - ( 2018 15:48 )  PTT:31.9 sec  Urinalysis Basic - ( 2018 15:48 )    Color: Yellow / Appearance: Cloudy / S.010 / pH: x  Gluc: x / Ketone: NEGATIVE  / Bili: Negative / Urobili: 0.2 E.U./dL   Blood: x / Protein: NEGATIVE mg/dL / Nitrite: NEGATIVE   Leuk Esterase: NEGATIVE / RBC: x / WBC x   Sq Epi: x / Non Sq Epi: x / Bacteria: x      ABO Interpretation: A (18 @ 14:47)      CARDIAC MARKERS ( 2018 15:48 )  x     / <0.01 ng/mL / 162 U/L / x     / 4.2 ng/mL          Hgb A1C: 6.4    EKG: < from: 12 Lead ECG (18 @ 17:42) >    Ventricular Rate 83 BPM    Atrial Rate 83 BPM    P-R Interval 122 ms    QRS Duration 96 ms    Q-T Interval 360 ms    QTC Calculation(Bezet) 423 ms    P Axis 73 degrees    R Axis 60 degrees    T Axis 54 degrees    Diagnosis Line Normal sinus rhythm  Low voltage Limb lead    Confirmed by Pauline Velasquez ([id number]) on 2018 2:05:07 PM    < end of copied text >      CXR: pending.    CT Scans: < from: CT Head No Cont (18 @ 19:32) >  EXAM:  CT BRAIN                          PROCEDURE DATE:  2018          INTERPRETATION:  PROCEDURE: CT head without intravenous contrast    CLINICAL INDICATION: Preoperative study prior to cardiac surgery, history   of CVA    TECHNIQUE: Multiple axial images were obtained and viewed at 5 mm   intervals from the skull base to the vertex. The images were reviewed in   brain and bone windows. Sagittal and coronal reformations are provided.    COMPARISON: No prior CT of the head    FINDINGS:     There is no evidence of acute intracranial hemorrhage.    There is evidence of infarction injury in the left anterior corona   radiata and frontal periventricular white matter. This is partially seen   on a prior CTA neck and is likely chronic. There is background mild small   vessel ischemic change with subtle patchy lucency elsewhere in cerebral   white matter. Gray-white matter differentiation of cortex appears   preserved without evidence of large territorial infarction. The   intracranialleft vertebral artery is heavily calcified.    There is no hydrocephalus. There is diffuse mild parenchymal volume loss.   No mass effect or midline shift.    Bone window images show the calvarium to be intact. There is chronic left   maxillary sinusdisease with atelectasis. The mastoid air cells are well   ventilated. The left native ocular lens has been replaced.    IMPRESSION:    No acute intracranial hemorrhage or mass effect.     There is left corona radiata and periventricular frontal ischemic injury   that appears chronic. No recent territorial infarction injury identified.          < end of copied text >      Cath Report:    < from: CT Heart without Coronaries w/ IV Cont (18 @ 12:27) >  EXAM:  CT HEART WITHOUT CORONARIES IC                          *** ADDENDUM 2018  ***      Limited CT of the chest with IV contrast 2018 12:27 PM    INDICATION: 62 year old male with vascular ring.      TECHNIQUE: CT of the medial aspects of the mid to lower chest was   obtained in conjunction with a coronary CT angiogram.  The coronary CT   angiogram is dictated separately by the attending cardiologist.  This   report pertains solely to the non-coronary portions of the study.  CONTRAST: 110 cc of Optiray 350 IV; with 15 cc discarded.    PRIOR STUDIES: None.      FINDINGS:  There is an aberrant right subclavian artery, originating off the   midportion of the aortic arch, without evidence of a prominent   diverticulum. There is mild focal ectasia of the posterior segment of the   aortic arch measuring up to 3.4 cm in maximal luminal diameter. There is   a left-sided thoracic aorta, without aortic dissection. The heart is   normal in size.  No pericardial effusion is seen.   There is no thrombus   in the left atrium or the left atrial appendage.  The imaged opacified   pulmonary arteries demonstrate no filling defect to indicate pulmonary   embolus. No gross mediastinal or hilar adenopathy identified.    There are scatteredcentrilobular and tree in bud nodularities throughout   the bilateral lower lobes, which appear airway related and consistent   with terminal bronchiolitis. Mild central bronchial wall thickening is   also noted bilaterally. No focal airspace consolidations or pleural   effusions are evident. Visualized central airways demonstrate no   obstructing endobronchial lesion, otherwise. Minimal focal inspissated   secretion in the mid trachea is noted. A 0.8 cm perifissural nodule is   noted in the posteroinferior segment of the right middle lobe.     Multilevel degenerative spondylotic changes are seen in the spine. No   aggressive osseous lesions throughout the visualized bones.    A 1.5 cm linear metallic densities noted in the region of the mid   esophagus, either representing surgical clips or ingested material.   Correlate clinically.      IMPRESSION:   1.  Left-sided thoracic aorta with an aberrant right subclavian artery   originating off the mid aortic arch; without evidence of a prominent   diverticulum.     2.  Mild focal ectasia of the posterior aortic arch measuring up to 3.4   cm in maximal luminal diameter. No aortic dissection.    3.  Scattered centrilobular and tree in bud nodularities throughout the   bilateral lower lobes,which appear airway related and consistent with   terminal bronchiolitis. These are likely infectious or inflammatory in   etiology.    4.  A 0.8 cm perifissural nodule in the posteroinferior segment of the   right middle lobe.    < end of copied text >    < from: CT Angio Neck w/ IV Cont (06.15.18 @ 16:40) >  EXAM:  CT ANGIO NECK (W)AW IC                          PROCEDURE DATE:  06/15/2018          INTERPRETATION:  PROCEDURE: CTA neck with and without intravenous contrast    INDICATION: Dysphagia    TECHNIQUE: Multiple axial thin section were obtained through the neck   following the intravenous injection of 120 ml of Optiray 350. MPR   sagittal and coronal MIP images were generated from the axial images. 3-D   reconstructions were also obtained and postprocessed on a independent   workstation.    COMPARISON: None    FINDINGS: The CTA examination demonstrates the right common carotid   artery to be normal in caliber. There is a normal bifurcation into the   right internal and external carotid arteries. There is calcification   involving the right carotid bifurcation and proximal right internal   carotid arteries. There is no hemodynamically significant stenosis. There   is a looped configuration to the distal right internal carotid artery.    The left common carotid artery is normal in caliber. There is a normal   bifurcation into the left internal and external carotid arteries. There   is calcification involving the carotid bifurcation and proximal left   internal carotid artery. There is a short segment of narrowing involving   the proximal left internal carotid artery approximately 10 mm from the   carotid bifurcation. The diameter of the luminal narrowing measures   approximately 1.5 mm. The normal distal left internal carotid artery   measures 4.6 mm. Utilizing NASCET criteria,this corresponds to   approximately 67% stenosis. There is a looped configuration to the   midportion of the left internal carotid artery.    Intraluminal contrast is not identified within the cervical vertebral   artery which may be secondary to occlusion. The right vertebral artery   appears normal in caliber.    There is a common origin to the right innominate and left common carotid   artery. The aortic arch appears intact without narrowing of the origin of   the great vessels.    Limited evaluation of intracranial circulation demonstrates the left A1   segment to be underdeveloped. The anterior communicating artery is   present. The left P1 segment is hypoplastic with a prominent left   posterior communicating artery supplying the left PCA territory.    Limited evaluation of the neck demonstrates mild dilatation of the left   piriform sinus with a paramedian orientation to the left vocal cord which   may be secondary to focal cord paresis and clinical correlation is   recommended. A 15 mm tubular metallic density is identified at the within   the esophagus at the level of the lamar and clinical correlation is   recommended (series [id number] image 31 and series 4 image 26). A second   similar tubular density is identified is distallyas best seen on the    topogram.    IMPRESSION: Approximately 67% left carotid stenosis. Occluded left   vertebral artery. Two approximately 15 mm tubular metallic density within   the esophagus of unclear radiology and clinical correlation is recommended.    < end of copied text >      Echo: < from: Echocardiogram (18 @ 12:28) >  EXAM:  ECHOCARDIOGRAM (CARDIOL)                          PROCEDURE DATE:  2018          INTERPRETATION:  Patient Height: 167.6 cm  Patient Weight: 56.7 kg  Heart Rate: 58 bpm  Systolic Pressure: 110 mmHg  Diastolic Pressure: 60 mmHg  BSA: 1.6m^2  Interpretation Summary  There is mild concentric left ventricular hypertrophy.The left   ventricular wall   motion is normal.The left ventricular ejection fraction is normal.The   left   ventricular ejection fraction is 65%.The right ventricle is normal in   size and   function.The left atrial size is normal.The mitral inflow pattern is   consistent with impaired left ventricular relaxation with mildly   elevated left   atrial pressure (8-14mmHg).  Right atrial size is normal.Calcified aortic   valve.No aortic regurgitation noted.There is Mild aortic stenosis.The   peak   pressure gradient is 15 mmHg.The mean pressure gradient is 7 mmHg.The   calculated aortic valve area using the continuity equation is 1.8   cm2.Mitral   annular calcification noted.Structurally normal mitral valve.There is   trace   mitral regurgitation.Structurally normal tricuspid valve.There is trace   tricuspid regurgitation.The pulmonary artery systolic pressure is   estimated to   be 30 mmHg.Structurally normal pulmonic valve.No pulmonic regurgitation   noted.There is no pericardial effusion.    < end of copied text >      PFT's:     Carotid Duplex: pending.    Consult in Chart?  YES   Consent in Chart? YES   Pre-op Orders Placed? YES   Blood Prodructs Ordered? YES   NPO ordered? YES Planned Date of Surgery: 7/10/18                                                                                                           Surgeon: Dr. 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 Patient lost 110 pounds since his stroke and has been evaluated by various specialists including [facility]- Dr. George, neuro- Dr. Howell, Gi - Dr. Rosenberg and Dr. Sood and thoracic surgery - Dr. Kam. The patient was recommended to Dr. Boyd for consideration of resection of the aberrant subclavian artery, which appears to be causing an anatomic obstruction of the esophagus with proximal dilatation resulting in dysphagia, with concomitant carotid subclavian bypass. The patient presents to [facility] for surgical intervention with Dr. Boyd planned for 7/10/18. Patient denies any swallowing problems in his childhood or prior to CVA. He now reports inability to enjoy life secondary to dysphagia and achalasia. He is now s/p POEM on 2018 and Botox injections to [facility] 2018 with no relief of symptoms. Patient is in no acute distress, denies recent hospitalization, ER visits or surgeries. Denies chest pain, SOB, palpitations, hemopytsis, N/V/D, abdominal pain, dizziness, fever or chills. Patient is ambulating with a cane.       PAST MEDICAL & SURGICAL HISTORY:  Dysphagia  HLD (hyperlipidemia)  Stroke  Diabetes  HTN (hypertension)  H/O hernia repair: At age 6 months.  S/P cataract surgery  S/P Botox injection: to [facility]      penicillin (Unknown)  penicillins (Unknown)      MEDICATIONS  (STANDING):  aspirin  chewable 81 milliGRAM(s) Oral daily  atorvastatin 80 milliGRAM(s) Oral at bedtime  chlorhexidine 0.12% Liquid 10 milliLiter(s) Swish and Spit once  chlorhexidine 4% Liquid 1 Application(s) Topical once  chlorhexidine 4% Liquid 1 Application(s) Topical once  docusate sodium 100 milliGRAM(s) Oral two times a day  heparin  Injectable 5000 Unit(s) SubCutaneous every 8 hours  pantoprazole    Tablet 40 milliGRAM(s) Oral before breakfast  repaglinide 1 milliGRAM(s) Oral three times a day  senna 2 Tablet(s) Oral at bedtime  sodium chloride 0.9% lock flush 3 milliLiter(s) IV Push every 8 hours    MEDICATIONS  (PRN):      On Beta Blocker? YES     Labs:                        11.2   7.1   )-----------( 262      ( 2018 15:48 )             35.0         142  |  98  |  26<H>  ----------------------------<  154<H>  4.8   |  35<H>  |  0.64    Ca    8.9      2018 15:48  Mg     2.4         TPro  6.7  /  Alb  3.9  /  TBili  0.2  /  DBili  x   /  AST  19  /  ALT  12  /  AlkPhos  57      PT/INR - ( 2018 15:48 )   PT: 11.7 sec;   INR: 1.05          PTT - ( 2018 15:48 )  PTT:31.9 sec  Urinalysis Basic - ( 2018 15:48 )    Color: Yellow / Appearance: Cloudy / S.010 / pH: x  Gluc: x / Ketone: NEGATIVE  / Bili: Negative / Urobili: 0.2 E.U./dL   Blood: x / Protein: NEGATIVE mg/dL / Nitrite: NEGATIVE   Leuk Esterase: NEGATIVE / RBC: x / WBC x   Sq Epi: x / Non Sq Epi: x / Bacteria: x      ABO Interpretation: A (18 @ 14:47)      CARDIAC MARKERS ( 2018 15:48 )  x     / <0.01 ng/mL / 162 U/L / x     / 4.2 ng/mL          Hgb A1C: 6.4    EKG: < from: 12 Lead ECG (18 @ 17:42) >    Ventricular Rate 83 BPM    Atrial Rate 83 BPM    P-R Interval 122 ms    QRS Duration 96 ms    Q-T Interval 360 ms    QTC Calculation(Bezet) 423 ms    P Axis 73 degrees    R Axis 60 degrees    T Axis 54 degrees    Diagnosis Line Normal sinus rhythm  Low voltage Limb lead    Confirmed by Pauline Velasquez ([id number]) on 2018 2:05:07 PM    < end of copied text >      CXR: pending.    CT Scans: < from: CT Head No Cont (18 @ 19:32) >  EXAM:  CT BRAIN                          PROCEDURE DATE:  2018          INTERPRETATION:  PROCEDURE: CT head without intravenous contrast    CLINICAL INDICATION: Preoperative study prior to cardiac surgery, history   of CVA    TECHNIQUE: Multiple axial images were obtained and viewed at 5 mm   intervals from the skull base to the vertex. The images were reviewed in   brain and bone windows. Sagittal and coronal reformations are provided.    COMPARISON: No prior CT of the head    FINDINGS:     There is no evidence of acute intracranial hemorrhage.    There is evidence of infarction injury in the left anterior corona   radiata and frontal periventricular white matter. This is partially seen   on a prior CTA neck and is likely chronic. There is background mild small   vessel ischemic change with subtle patchy lucency elsewhere in cerebral   white matter. Gray-white matter differentiation of cortex appears   preserved without evidence of large territorial infarction. The   intracranialleft vertebral artery is heavily calcified.    There is no hydrocephalus. There is diffuse mild parenchymal volume loss.   No mass effect or midline shift.    Bone window images show the calvarium to be intact. There is chronic left   maxillary sinusdisease with atelectasis. The mastoid air cells are well   ventilated. The left native ocular lens has been replaced.    IMPRESSION:    No acute intracranial hemorrhage or mass effect.     There is left corona radiata and periventricular frontal ischemic injury   that appears chronic. No recent territorial infarction injury identified.          < end of copied text >      Cath Report:    < from: CT Heart without Coronaries w/ IV Cont (18 @ 12:27) >  EXAM:  CT HEART WITHOUT CORONARIES IC                          *** ADDENDUM 2018  ***      Limited CT of the chest with IV contrast 2018 12:27 PM    INDICATION: 62 year old male with vascular ring.      TECHNIQUE: CT of the medial aspects of the mid to lower chest was   obtained in conjunction with a coronary CT angiogram.  The coronary CT   angiogram is dictated separately by the attending cardiologist.  This   report pertains solely to the non-coronary portions of the study.  CONTRAST: 110 cc of Optiray 350 IV; with 15 cc discarded.    PRIOR STUDIES: None.      FINDINGS:  There is an aberrant right subclavian artery, originating off the   midportion of the aortic arch, without evidence of a prominent   diverticulum. There is mild focal ectasia of the posterior segment of the   aortic arch measuring up to 3.4 cm in maximal luminal diameter. There is   a left-sided thoracic aorta, without aortic dissection. The heart is   normal in size.  No pericardial effusion is seen.   There is no thrombus   in the left atrium or the left atrial appendage.  The imaged opacified   pulmonary arteries demonstrate no filling defect to indicate pulmonary   embolus. No gross mediastinal or hilar adenopathy identified.    There are scatteredcentrilobular and tree in bud nodularities throughout   the bilateral lower lobes, which appear airway related and consistent   with terminal bronchiolitis. Mild central bronchial wall thickening is   also noted bilaterally. No focal airspace consolidations or pleural   effusions are evident. Visualized central airways demonstrate no   obstructing endobronchial lesion, otherwise. Minimal focal inspissated   secretion in the mid trachea is noted. A 0.8 cm perifissural nodule is   noted in the posteroinferior segment of the right middle lobe.     Multilevel degenerative spondylotic changes are seen in the spine. No   aggressive osseous lesions throughout the visualized bones.    A 1.5 cm linear metallic densities noted in the region of the mid   esophagus, either representing surgical clips or ingested material.   Correlate clinically.      IMPRESSION:   1.  Left-sided thoracic aorta with an aberrant right subclavian artery   originating off the mid aortic arch; without evidence of a prominent   diverticulum.     2.  Mild focal ectasia of the posterior aortic arch measuring up to 3.4   cm in maximal luminal diameter. No aortic dissection.    3.  Scattered centrilobular and tree in bud nodularities throughout the   bilateral lower lobes,which appear airway related and consistent with   terminal bronchiolitis. These are likely infectious or inflammatory in   etiology.    4.  A 0.8 cm perifissural nodule in the posteroinferior segment of the   right middle lobe.    < end of copied text >    < from: CT Angio Neck w/ IV Cont (06.15.18 @ 16:40) >  EXAM:  CT ANGIO NECK (W)AW IC                          PROCEDURE DATE:  06/15/2018          INTERPRETATION:  PROCEDURE: CTA neck with and without intravenous contrast    INDICATION: Dysphagia    TECHNIQUE: Multiple axial thin section were obtained through the neck   following the intravenous injection of 120 ml of Optiray 350. MPR   sagittal and coronal MIP images were generated from the axial images. 3-D   reconstructions were also obtained and postprocessed on a independent   workstation.    COMPARISON: None    FINDINGS: The CTA examination demonstrates the right common carotid   artery to be normal in caliber. There is a normal bifurcation into the   right internal and external carotid arteries. There is calcification   involving the right carotid bifurcation and proximal right internal   carotid arteries. There is no hemodynamically significant stenosis. There   is a looped configuration to the distal right internal carotid artery.    The left common carotid artery is normal in caliber. There is a normal   bifurcation into the left internal and external carotid arteries. There   is calcification involving the carotid bifurcation and proximal left   internal carotid artery. There is a short segment of narrowing involving   the proximal left internal carotid artery approximately 10 mm from the   carotid bifurcation. The diameter of the luminal narrowing measures   approximately 1.5 mm. The normal distal left internal carotid artery   measures 4.6 mm. Utilizing NASCET criteria,this corresponds to   approximately 67% stenosis. There is a looped configuration to the   midportion of the left internal carotid artery.    Intraluminal contrast is not identified within the cervical vertebral   artery which may be secondary to occlusion. The right vertebral artery   appears normal in caliber.    There is a common origin to the right innominate and left common carotid   artery. The aortic arch appears intact without narrowing of the origin of   the great vessels.    Limited evaluation of intracranial circulation demonstrates the left A1   segment to be underdeveloped. The anterior communicating artery is   present. The left P1 segment is hypoplastic with a prominent left   posterior communicating artery supplying the left PCA territory.    Limited evaluation of the neck demonstrates mild dilatation of the left   piriform sinus with a paramedian orientation to the left vocal cord which   may be secondary to focal cord paresis and clinical correlation is   recommended. A 15 mm tubular metallic density is identified at the within   the esophagus at the level of the lamar and clinical correlation is   recommended (series [id number] image 31 and series 4 image 26). A second   similar tubular density is identified is distallyas best seen on the    topogram.    IMPRESSION: Approximately 67% left carotid stenosis. Occluded left   vertebral artery. Two approximately 15 mm tubular metallic density within   the esophagus of unclear radiology and clinical correlation is recommended.    < end of copied text >      Echo: < from: Echocardiogram (18 @ 12:28) >  EXAM:  ECHOCARDIOGRAM (CARDIOL)                          PROCEDURE DATE:  2018          INTERPRETATION:  Patient Height: 167.6 cm  Patient Weight: 56.7 kg  Heart Rate: 58 bpm  Systolic Pressure: 110 mmHg  Diastolic Pressure: 60 mmHg  BSA: 1.6m^2  Interpretation Summary  There is mild concentric left ventricular hypertrophy.The left   ventricular wall   motion is normal.The left ventricular ejection fraction is normal.The   left   ventricular ejection fraction is 65%.The right ventricle is normal in   size and   function.The left atrial size is normal.The mitral inflow pattern is   consistent with impaired left ventricular relaxation with mildly   elevated left   atrial pressure (8-14mmHg).  Right atrial size is normal.Calcified aortic   valve.No aortic regurgitation noted.There is Mild aortic stenosis.The   peak   pressure gradient is 15 mmHg.The mean pressure gradient is 7 mmHg.The   calculated aortic valve area using the continuity equation is 1.8   cm2.Mitral   annular calcification noted.Structurally normal mitral valve.There is   trace   mitral regurgitation.Structurally normal tricuspid valve.There is trace   tricuspid regurgitation.The pulmonary artery systolic pressure is   estimated to   be 30 mmHg.Structurally normal pulmonic valve.No pulmonic regurgitation   noted.There is no pericardial effusion.    < end of copied text >      PFT's:  FEV1/FVC: 97%    Carotid Duplex: pending.    Consult in Chart?  YES   Consent in Chart? YES   Pre-op Orders Placed? YES   Blood Prodructs Ordered? YES   NPO ordered? YES

## 2018-07-09 NOTE — PROGRESS NOTE ADULT - ASSESSMENT
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 Patient lost 110 pounds since his stroke and has been evaluated by various specialists including FLOYD- Dr. George, neuro- Dr. Howell, Gi - Dr. Rosenberg and Dr. Sood and thoracic surgery - Dr. Kam. The patient was recommended to Dr. Boyd for consideration of resection of the aberrant subclavian artery, which appears to be causing an anatomic obstruction of the esophagus with proximal dilatation resulting in dysphagia, with concomitant carotid subclavian bypass. The patient presents to [facility] for surgical intervention with Dr. Boyd planned for 7/10/18. Patient denies any swallowing problems in his childhood or prior to CVA. He now reports inability to enjoy life secondary to dysphagia and achalasia. He is now s/p POEM on 2/2018 and Botox injections to UES 5/2018 with no relief of symptoms. Patient is in no acute distress, denies recent hospitalization, ER visits or surgeries. Denies chest pain, SOB, palpitations, hemopytsis, N/V/D, abdominal pain, dizziness, fever or chills. Patient is ambulating with a cane.      Plan:  - Preop for tomorrow.

## 2018-07-09 NOTE — H&P ADULT - PMH
Diabetes    Dysphagia    HLD (hyperlipidemia)    HTN (hypertension)    Stroke Coronary artery disease    Diabetes    Dysphagia    HLD (hyperlipidemia)    HTN (hypertension)    Stroke

## 2018-07-09 NOTE — H&P ADULT - NSHPPHYSICALEXAM_GEN_ALL_CORE
General: Thin male lying comfortably in bed, wife at bedside, no acute distress     Neurological: Alert and oriented. No focal neurological deficits, strength 5/5 b/l upper and lower extremities, sensation intact b/l.     Cardiovascular: S1S2, RRR, no murmurs appreciated on exam     Respiratory: Clear to ausculation bilaterally, no wheezes rales or rhonchi.     Gastrointestinal: PEG in place, clean, dry, no signs of infection, Abdomen soft, non tender, non distended     Extremities: Warm and well perfused. No peripheral edema or calf tenderness     Vascular: Peripheral pulses palpable, DP/PT 2+, radial 2+ b/l

## 2018-07-09 NOTE — H&P ADULT - NSHPSOCIALHISTORY_GEN_ALL_CORE
Former smoker - 1pack/week x 1 year, quit 2 years ago.  Denies ETOH or illicit drug use.  Ambulates with a cane and with assistance.

## 2018-07-09 NOTE — H&P ADULT - ASSESSMENT
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 Patient lost 110 pounds since his stroke and has been evaluated by various specialists including [facility]- Dr. George, neuro- Dr. Howell, Gi - Dr. Rosenberg and Dr. Sood and thoracic surgery - Dr. Kam. The patient was recommended to Dr. Boyd for consideration of resection of the aberrant subclavian artery, which appears to be causing an anatomic obstruction of the esophagus with proximal dilatation resulting in dysphagia, with concomitant carotid subclavian bypass. The patient presents to [facility] for surgical intervention with Dr. Boyd planned for 7/10/18. Patient denies any swallowing problems in his childhood or prior to CVA. He now reports inability to enjoy life secondary to dysphagia and achalasia. He is now s/p POEM on 2/2018 and Botox injections to UES 5/2018 with no relief of symptoms. Patient is in no acute distress, denies recent hospitalization, ER visits or surgeries. Denies chest pain, SOB, palpitations, hemopytsis, N/V/D, abdominal pain, dizziness, fever or chills. Patient is ambulating with a cane.

## 2018-07-09 NOTE — H&P ADULT - HISTORY OF PRESENT ILLNESS
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 Patient lost 110 pounds since his stroke and has been evaluated by various specialists including [facility]FO[facility]- Dr. George, neuro- Dr. Howell, Gi - Dr. Rosenberg and Dr. Sood and thoracic surgery - Dr. Kam. Patient underwent 62 year old Male with PMHx significant for HTN, HLD, DM, CVA 5/4/17 resulting in left sided hemiparesis, dysphagia, and PEG placement. Patient lost 110 pounds since his stroke and has been evaluated by various specialists including [facility]- Dr. George, neuro- Dr. Howell, Gi - Dr. Rosenberg and Dr. Sood and thoracic surgery - Dr. Kam. Patient underwent Videofluoroscopic swallow study on 10/27/17 which revealed pharyngeoesophageal swallow is severely profoundly impaired. Patient underwent high resolution esophageal motility study 1/2/18 revealing achalasia with Type III pattern. S/p EDG with esophageal POEM on 2/15/18 which revealed hypertensive esophagus s/p perioral endoscopic myotomy. 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 CTA Neck and CTA coronaries were performed in 6/2018 and patient was referred to Dr. Boyd by Dr. Kam. On review of the CT angiogram/coronary angiogram, patient has evidence of aberrant right subclavian artery with origin from the proximal arch, the course is posterior to the esophogram, the esophagus is dilated proximal to this. The patient was recommended to Dr. Boyd for consideration of resection of the aberrant subclavian artery, which appears to be causing an anatomic obstruction of the esophagus with proximal dilatation resulting in dysphagia, with concomitant carotid subclavian bypass. The patient presents to [facility] for surgical intervention with Dr. Boyd. Patient denies any swallowing problems in his childhood or prior to CVA. He now reports inability to     The patient presents to [facility] today for CABG, aorto to right subclavian artery bypass, possible TEVAR 62 year old Male with PMHx significant for HTN, HLD, DM, CVA 5/4/17 resulting in left sided hemiparesis, dysphagia, and PEG placement. Patient lost 110 pounds since his stroke and has been evaluated by various specialists including [facility]- Dr. George, neuro- Dr. Howell, Gi - Dr. Rosenberg and Dr. Sood and thoracic surgery - Dr. Kam. Patient underwent Videofluoroscopic swallow study on 10/27/17 which revealed pharyngeoesophageal swallow is severely profoundly impaired. Patient underwent high resolution esophageal motility study 1/2/18 revealing achalasia with Type III pattern. S/p EDG with esophageal POEM on 2/15/18 which revealed hypertensive esophagus s/p perioral endoscopic myotomy. 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 CTA Neck and CTA coronaries were performed in 6/2018 and patient was referred to Dr. Boyd by Dr. Kam. On review of the CT angiogram/coronary angiogram, patient has evidence of aberrant right subclavian artery with origin from the proximal arch, the course is posterior to the esophogram, the esophagus is dilated proximal to this. The patient was recommended to Dr. Boyd for consideration of resection of the aberrant subclavian artery, which appears to be causing an anatomic obstruction of the esophagus with proximal dilatation resulting in dysphagia, with concomitant carotid subclavian bypass. The patient presents to [facility] for surgical intervention with Dr. Boyd planned for 7/10/18. Patient denies any swallowing problems in his childhood or prior to CVA. He now reports inability to enjoy life secondary to dysphagia and achalasia. He is now s/p POEM on 2/2018 and Botox injections to UES 5/2018 with no relief of symptoms. Patient is in no acute distress, denies recent hospitalization, ER visits or surgeries. Denies chest pain, SOB, palpitations, hemopytsis, N/V/D, abdominal pain, dizziness, fever or chills. Patient is ambulating with a cane.

## 2018-07-09 NOTE — H&P ADULT - NSHPREVIEWOFSYSTEMS_GEN_ALL_CORE
Review of Systems  CONSTITUTIONAL:  + weight loss Denies Fevers / chills, sweats, fatigue, weight gain                                      NEURO:  +difficulty walking, history of falls, Denies parathesias, seizures, syncope, confusion                                                  EYES:  Denies Blurry vision, discharge, pain, loss of vision                                                                                    ENMT:  +dysphagia Denies Difficulty hearing, vertigo, epistaxis, recent dental work                                       CV:  Denies Chest pain, palpitations, GODWIN, orthopnea                                                                                          RESPIRATORY:  Denies Wheezing, SOB, cough / sputum, hemoptysis                                                                GI:  +PEG feeding, Denies Nausea, vomiting, diarrhea, constipation, melena                                               : Denies Hematuria, dysuria, urgency, incontinence                                                                                         MUSKULOSKELETAL:  Denies arthritis, joint swelling                                                           SKIN/BREAST:  Denies rash, itching, hair loss, masses                                                                                            PSYCH:  Denies depression, anxiety, suicidal ideation                                                                                               HEME/LYMPH:  Denies bruises easily, enlarged lymph nodes, tender lymph nodes                                        ENDOCRINE:  Denies cold intolerance, heat intolerance, polydipsia

## 2018-07-09 NOTE — H&P ADULT - PROBLEM SELECTOR PLAN 2
- Continue to monitor BP/HR. - Continue to monitor BP/HR.  - Started Lopressor 12.5mg PO qd with hold parameters.

## 2018-07-09 NOTE — H&P ADULT - PROBLEM SELECTOR PLAN 1
- Plan is for OR tomorrow.  - NPO at midnight.  - Restarted home meds.  - GI PPX.  - DVT PPx. - Plan is for OR tomorrow with Dr. Boyd.  - NPO at midnight.  - Restarted home meds - holding Amlodipine.  - GI PPX.  - DVT PPx.

## 2018-07-09 NOTE — PATIENT PROFILE ADULT. - FALL HARM RISK
coagulation(Bleeding disorder R/T clinical cond/anti-coags)/bones(Osteoporosis,prev fx,steroid use,metastatic bone ca/hx of stroke/other

## 2018-07-10 ENCOUNTER — APPOINTMENT (OUTPATIENT)
Dept: CARDIOTHORACIC SURGERY | Facility: HOSPITAL | Age: 63
End: 2018-07-10
Payer: MEDICAID

## 2018-07-10 LAB
ALBUMIN SERPL ELPH-MCNC: 3.4 G/DL — SIGNIFICANT CHANGE UP (ref 3.3–5)
ALBUMIN SERPL ELPH-MCNC: 3.5 G/DL — SIGNIFICANT CHANGE UP (ref 3.3–5)
ALP SERPL-CCNC: 35 U/L — LOW (ref 40–120)
ALP SERPL-CCNC: 54 U/L — SIGNIFICANT CHANGE UP (ref 40–120)
ALT FLD-CCNC: 12 U/L — SIGNIFICANT CHANGE UP (ref 10–45)
ALT FLD-CCNC: 13 U/L — SIGNIFICANT CHANGE UP (ref 10–45)
ANION GAP SERPL CALC-SCNC: 11 MMOL/L — SIGNIFICANT CHANGE UP (ref 5–17)
ANION GAP SERPL CALC-SCNC: 13 MMOL/L — SIGNIFICANT CHANGE UP (ref 5–17)
ANION GAP SERPL CALC-SCNC: 14 MMOL/L — SIGNIFICANT CHANGE UP (ref 5–17)
ANION GAP SERPL CALC-SCNC: 9 MMOL/L — SIGNIFICANT CHANGE UP (ref 5–17)
APTT BLD: 40.7 SEC — HIGH (ref 27.5–37.4)
APTT BLD: 50.1 SEC — HIGH (ref 27.5–37.4)
APTT BLD: 68.4 SEC — HIGH (ref 27.5–37.4)
AST SERPL-CCNC: 19 U/L — SIGNIFICANT CHANGE UP (ref 10–40)
AST SERPL-CCNC: 24 U/L — SIGNIFICANT CHANGE UP (ref 10–40)
BASOPHILS NFR BLD AUTO: 0.1 % — SIGNIFICANT CHANGE UP (ref 0–2)
BASOPHILS NFR BLD AUTO: 0.1 % — SIGNIFICANT CHANGE UP (ref 0–2)
BASOPHILS NFR BLD AUTO: 0.4 % — SIGNIFICANT CHANGE UP (ref 0–2)
BILIRUB SERPL-MCNC: 0.2 MG/DL — SIGNIFICANT CHANGE UP (ref 0.2–1.2)
BILIRUB SERPL-MCNC: 1.1 MG/DL — SIGNIFICANT CHANGE UP (ref 0.2–1.2)
BUN SERPL-MCNC: 16 MG/DL — SIGNIFICANT CHANGE UP (ref 7–23)
BUN SERPL-MCNC: 17 MG/DL — SIGNIFICANT CHANGE UP (ref 7–23)
BUN SERPL-MCNC: 17 MG/DL — SIGNIFICANT CHANGE UP (ref 7–23)
BUN SERPL-MCNC: 21 MG/DL — SIGNIFICANT CHANGE UP (ref 7–23)
CALCIUM SERPL-MCNC: 8.1 MG/DL — LOW (ref 8.4–10.5)
CALCIUM SERPL-MCNC: 8.1 MG/DL — LOW (ref 8.4–10.5)
CALCIUM SERPL-MCNC: 8.5 MG/DL — SIGNIFICANT CHANGE UP (ref 8.4–10.5)
CALCIUM SERPL-MCNC: 8.8 MG/DL — SIGNIFICANT CHANGE UP (ref 8.4–10.5)
CHLORIDE SERPL-SCNC: 100 MMOL/L — SIGNIFICANT CHANGE UP (ref 96–108)
CHLORIDE SERPL-SCNC: 98 MMOL/L — SIGNIFICANT CHANGE UP (ref 96–108)
CO2 SERPL-SCNC: 25 MMOL/L — SIGNIFICANT CHANGE UP (ref 22–31)
CO2 SERPL-SCNC: 27 MMOL/L — SIGNIFICANT CHANGE UP (ref 22–31)
CO2 SERPL-SCNC: 28 MMOL/L — SIGNIFICANT CHANGE UP (ref 22–31)
CO2 SERPL-SCNC: 31 MMOL/L — SIGNIFICANT CHANGE UP (ref 22–31)
CREAT SERPL-MCNC: 0.52 MG/DL — SIGNIFICANT CHANGE UP (ref 0.5–1.3)
CREAT SERPL-MCNC: 0.52 MG/DL — SIGNIFICANT CHANGE UP (ref 0.5–1.3)
CREAT SERPL-MCNC: 0.55 MG/DL — SIGNIFICANT CHANGE UP (ref 0.5–1.3)
CREAT SERPL-MCNC: 0.55 MG/DL — SIGNIFICANT CHANGE UP (ref 0.5–1.3)
EOSINOPHIL NFR BLD AUTO: 0.2 % — SIGNIFICANT CHANGE UP (ref 0–6)
EOSINOPHIL NFR BLD AUTO: 2.8 % — SIGNIFICANT CHANGE UP (ref 0–6)
GAS PNL BLDA: SIGNIFICANT CHANGE UP
GLUCOSE BLDC GLUCOMTR-MCNC: 120 MG/DL — HIGH (ref 70–99)
GLUCOSE BLDC GLUCOMTR-MCNC: 163 MG/DL — HIGH (ref 70–99)
GLUCOSE BLDC GLUCOMTR-MCNC: 182 MG/DL — HIGH (ref 70–99)
GLUCOSE BLDC GLUCOMTR-MCNC: 186 MG/DL — HIGH (ref 70–99)
GLUCOSE BLDC GLUCOMTR-MCNC: 212 MG/DL — HIGH (ref 70–99)
GLUCOSE BLDC GLUCOMTR-MCNC: 233 MG/DL — HIGH (ref 70–99)
GLUCOSE SERPL-MCNC: 118 MG/DL — HIGH (ref 70–99)
GLUCOSE SERPL-MCNC: 181 MG/DL — HIGH (ref 70–99)
GLUCOSE SERPL-MCNC: 225 MG/DL — HIGH (ref 70–99)
GLUCOSE SERPL-MCNC: 227 MG/DL — HIGH (ref 70–99)
HCT VFR BLD CALC: 24.8 % — LOW (ref 39–50)
HCT VFR BLD CALC: 28.9 % — LOW (ref 39–50)
HCT VFR BLD CALC: 34.4 % — LOW (ref 39–50)
HCT VFR BLD CALC: 37.4 % — LOW (ref 39–50)
HGB BLD-MCNC: 11.9 G/DL — LOW (ref 13–17)
HGB BLD-MCNC: 12.2 G/DL — LOW (ref 13–17)
HGB BLD-MCNC: 8.3 G/DL — LOW (ref 13–17)
HGB BLD-MCNC: 9.8 G/DL — LOW (ref 13–17)
INR BLD: 1.19 — HIGH (ref 0.88–1.16)
INR BLD: 1.24 — HIGH (ref 0.88–1.16)
INR BLD: 1.27 — HIGH (ref 0.88–1.16)
LACTATE SERPL-SCNC: 1.7 MMOL/L — SIGNIFICANT CHANGE UP (ref 0.5–2)
LACTATE SERPL-SCNC: 3.2 MMOL/L — HIGH (ref 0.5–2)
LACTATE SERPL-SCNC: 3.2 MMOL/L — HIGH (ref 0.5–2)
LYMPHOCYTES # BLD AUTO: 18.2 % — SIGNIFICANT CHANGE UP (ref 13–44)
LYMPHOCYTES # BLD AUTO: 5.1 % — LOW (ref 13–44)
LYMPHOCYTES # BLD AUTO: 7.1 % — LOW (ref 13–44)
MAGNESIUM SERPL-MCNC: 1.9 MG/DL — SIGNIFICANT CHANGE UP (ref 1.6–2.6)
MAGNESIUM SERPL-MCNC: 1.9 MG/DL — SIGNIFICANT CHANGE UP (ref 1.6–2.6)
MAGNESIUM SERPL-MCNC: 2.4 MG/DL — SIGNIFICANT CHANGE UP (ref 1.6–2.6)
MCHC RBC-ENTMCNC: 29.9 PG — SIGNIFICANT CHANGE UP (ref 27–34)
MCHC RBC-ENTMCNC: 30.1 PG — SIGNIFICANT CHANGE UP (ref 27–34)
MCHC RBC-ENTMCNC: 30.5 PG — SIGNIFICANT CHANGE UP (ref 27–34)
MCHC RBC-ENTMCNC: 32.2 PG — SIGNIFICANT CHANGE UP (ref 27–34)
MCHC RBC-ENTMCNC: 32.6 G/DL — SIGNIFICANT CHANGE UP (ref 32–36)
MCHC RBC-ENTMCNC: 33.5 G/DL — SIGNIFICANT CHANGE UP (ref 32–36)
MCHC RBC-ENTMCNC: 33.9 G/DL — SIGNIFICANT CHANGE UP (ref 32–36)
MCHC RBC-ENTMCNC: 34.6 G/DL — SIGNIFICANT CHANGE UP (ref 32–36)
MCV RBC AUTO: 88.2 FL — SIGNIFICANT CHANGE UP (ref 80–100)
MCV RBC AUTO: 88.7 FL — SIGNIFICANT CHANGE UP (ref 80–100)
MCV RBC AUTO: 89.2 FL — SIGNIFICANT CHANGE UP (ref 80–100)
MCV RBC AUTO: 98.7 FL — SIGNIFICANT CHANGE UP (ref 80–100)
MONOCYTES NFR BLD AUTO: 6.1 % — SIGNIFICANT CHANGE UP (ref 2–14)
MONOCYTES NFR BLD AUTO: 6.1 % — SIGNIFICANT CHANGE UP (ref 2–14)
MONOCYTES NFR BLD AUTO: 6.5 % — SIGNIFICANT CHANGE UP (ref 2–14)
NEUTROPHILS NFR BLD AUTO: 72.5 % — SIGNIFICANT CHANGE UP (ref 43–77)
NEUTROPHILS NFR BLD AUTO: 86.1 % — HIGH (ref 43–77)
NEUTROPHILS NFR BLD AUTO: 88.7 % — HIGH (ref 43–77)
PHOSPHATE SERPL-MCNC: 3.1 MG/DL — SIGNIFICANT CHANGE UP (ref 2.5–4.5)
PHOSPHATE SERPL-MCNC: 3.3 MG/DL — SIGNIFICANT CHANGE UP (ref 2.5–4.5)
PHOSPHATE SERPL-MCNC: 3.6 MG/DL — SIGNIFICANT CHANGE UP (ref 2.5–4.5)
PLATELET # BLD AUTO: 159 K/UL — SIGNIFICANT CHANGE UP (ref 150–400)
PLATELET # BLD AUTO: 160 K/UL — SIGNIFICANT CHANGE UP (ref 150–400)
PLATELET # BLD AUTO: 208 K/UL — SIGNIFICANT CHANGE UP (ref 150–400)
PLATELET # BLD AUTO: 241 K/UL — SIGNIFICANT CHANGE UP (ref 150–400)
POTASSIUM SERPL-MCNC: 3.8 MMOL/L — SIGNIFICANT CHANGE UP (ref 3.5–5.3)
POTASSIUM SERPL-MCNC: 4.4 MMOL/L — SIGNIFICANT CHANGE UP (ref 3.5–5.3)
POTASSIUM SERPL-SCNC: 3.8 MMOL/L — SIGNIFICANT CHANGE UP (ref 3.5–5.3)
POTASSIUM SERPL-SCNC: 4.4 MMOL/L — SIGNIFICANT CHANGE UP (ref 3.5–5.3)
PROT SERPL-MCNC: 5 G/DL — LOW (ref 6–8.3)
PROT SERPL-MCNC: 6.1 G/DL — SIGNIFICANT CHANGE UP (ref 6–8.3)
PROTHROM AB SERPL-ACNC: 13.3 SEC — HIGH (ref 9.8–12.7)
PROTHROM AB SERPL-ACNC: 13.8 SEC — HIGH (ref 9.8–12.7)
PROTHROM AB SERPL-ACNC: 14.2 SEC — HIGH (ref 9.8–12.7)
RBC # BLD: 2.78 M/UL — LOW (ref 4.2–5.8)
RBC # BLD: 3.26 M/UL — LOW (ref 4.2–5.8)
RBC # BLD: 3.79 M/UL — LOW (ref 4.2–5.8)
RBC # BLD: 3.9 M/UL — LOW (ref 4.2–5.8)
RBC # FLD: 14 % — SIGNIFICANT CHANGE UP (ref 10.3–16.9)
RBC # FLD: 17.8 % — HIGH (ref 10.3–16.9)
RBC # FLD: 18.1 % — HIGH (ref 10.3–16.9)
RBC # FLD: 18.1 % — HIGH (ref 10.3–16.9)
SODIUM SERPL-SCNC: 138 MMOL/L — SIGNIFICANT CHANGE UP (ref 135–145)
SODIUM SERPL-SCNC: 139 MMOL/L — SIGNIFICANT CHANGE UP (ref 135–145)
SODIUM SERPL-SCNC: 139 MMOL/L — SIGNIFICANT CHANGE UP (ref 135–145)
SODIUM SERPL-SCNC: 140 MMOL/L — SIGNIFICANT CHANGE UP (ref 135–145)
WBC # BLD: 13.9 K/UL — HIGH (ref 3.8–10.5)
WBC # BLD: 7.6 K/UL — SIGNIFICANT CHANGE UP (ref 3.8–10.5)
WBC # BLD: 8.6 K/UL — SIGNIFICANT CHANGE UP (ref 3.8–10.5)
WBC # BLD: 9.6 K/UL — SIGNIFICANT CHANGE UP (ref 3.8–10.5)
WBC # FLD AUTO: 13.9 K/UL — HIGH (ref 3.8–10.5)
WBC # FLD AUTO: 7.6 K/UL — SIGNIFICANT CHANGE UP (ref 3.8–10.5)
WBC # FLD AUTO: 8.6 K/UL — SIGNIFICANT CHANGE UP (ref 3.8–10.5)
WBC # FLD AUTO: 9.6 K/UL — SIGNIFICANT CHANGE UP (ref 3.8–10.5)

## 2018-07-10 PROCEDURE — 35526 ART BYP GRFT AOR/CAROT/INNOM: CPT | Mod: AS

## 2018-07-10 PROCEDURE — 33533 CABG ARTERIAL SINGLE: CPT | Mod: AS

## 2018-07-10 PROCEDURE — 35526 ART BYP GRFT AOR/CAROT/INNOM: CPT | Mod: 80

## 2018-07-10 PROCEDURE — 35206 REPAIR BLOOD VESSEL DIR UXTR: CPT | Mod: AS

## 2018-07-10 PROCEDURE — 99292 CRITICAL CARE ADDL 30 MIN: CPT

## 2018-07-10 PROCEDURE — 33518 CABG ARTERY-VEIN TWO: CPT | Mod: AS

## 2018-07-10 PROCEDURE — 93010 ELECTROCARDIOGRAM REPORT: CPT

## 2018-07-10 PROCEDURE — 35206 REPAIR BLOOD VESSEL DIR UXTR: CPT | Mod: 62

## 2018-07-10 PROCEDURE — 33533 CABG ARTERIAL SINGLE: CPT | Mod: 80

## 2018-07-10 PROCEDURE — 33533 CABG ARTERIAL SINGLE: CPT

## 2018-07-10 PROCEDURE — 71045 X-RAY EXAM CHEST 1 VIEW: CPT | Mod: 26,76

## 2018-07-10 PROCEDURE — 35526 ART BYP GRFT AOR/CAROT/INNOM: CPT

## 2018-07-10 PROCEDURE — 99291 CRITICAL CARE FIRST HOUR: CPT

## 2018-07-10 PROCEDURE — 33518 CABG ARTERY-VEIN TWO: CPT

## 2018-07-10 PROCEDURE — 33518 CABG ARTERY-VEIN TWO: CPT | Mod: 80

## 2018-07-10 RX ORDER — ALBUMIN HUMAN 25 %
250 VIAL (ML) INTRAVENOUS
Qty: 0 | Refills: 0 | Status: COMPLETED | OUTPATIENT
Start: 2018-07-10 | End: 2018-07-10

## 2018-07-10 RX ORDER — HEPARIN SODIUM 5000 [USP'U]/ML
5000 INJECTION INTRAVENOUS; SUBCUTANEOUS EVERY 8 HOURS
Qty: 0 | Refills: 0 | Status: DISCONTINUED | OUTPATIENT
Start: 2018-07-10 | End: 2018-07-18

## 2018-07-10 RX ORDER — VANCOMYCIN HCL 1 G
1000 VIAL (EA) INTRAVENOUS EVERY 12 HOURS
Qty: 0 | Refills: 0 | Status: DISCONTINUED | OUTPATIENT
Start: 2018-07-10 | End: 2018-07-12

## 2018-07-10 RX ORDER — METOPROLOL TARTRATE 50 MG
12.5 TABLET ORAL ONCE
Qty: 0 | Refills: 0 | Status: COMPLETED | OUTPATIENT
Start: 2018-07-10 | End: 2018-07-10

## 2018-07-10 RX ORDER — PROPOFOL 10 MG/ML
20 INJECTION, EMULSION INTRAVENOUS
Qty: 500 | Refills: 0 | Status: DISCONTINUED | OUTPATIENT
Start: 2018-07-10 | End: 2018-07-10

## 2018-07-10 RX ORDER — DEXMEDETOMIDINE HYDROCHLORIDE IN 0.9% SODIUM CHLORIDE 4 UG/ML
0.4 INJECTION INTRAVENOUS
Qty: 200 | Refills: 0 | Status: DISCONTINUED | OUTPATIENT
Start: 2018-07-10 | End: 2018-07-11

## 2018-07-10 RX ORDER — CLEVIDIPINE BUTYRATE 50MG/100ML
2 VIAL (ML) INTRAVENOUS
Qty: 25 | Refills: 0 | Status: DISCONTINUED | OUTPATIENT
Start: 2018-07-10 | End: 2018-07-11

## 2018-07-10 RX ORDER — SODIUM CHLORIDE 9 MG/ML
1000 INJECTION INTRAMUSCULAR; INTRAVENOUS; SUBCUTANEOUS
Qty: 0 | Refills: 0 | Status: DISCONTINUED | OUTPATIENT
Start: 2018-07-10 | End: 2018-07-18

## 2018-07-10 RX ORDER — DEXTROSE 50 % IN WATER 50 %
50 SYRINGE (ML) INTRAVENOUS
Qty: 0 | Refills: 0 | Status: DISCONTINUED | OUTPATIENT
Start: 2018-07-10 | End: 2018-07-11

## 2018-07-10 RX ORDER — FENTANYL CITRATE 50 UG/ML
25 INJECTION INTRAVENOUS ONCE
Qty: 0 | Refills: 0 | Status: DISCONTINUED | OUTPATIENT
Start: 2018-07-10 | End: 2018-07-10

## 2018-07-10 RX ORDER — INSULIN HUMAN 100 [IU]/ML
2 INJECTION, SOLUTION SUBCUTANEOUS
Qty: 100 | Refills: 0 | Status: DISCONTINUED | OUTPATIENT
Start: 2018-07-10 | End: 2018-07-11

## 2018-07-10 RX ORDER — ASPIRIN/CALCIUM CARB/MAGNESIUM 324 MG
81 TABLET ORAL DAILY
Qty: 0 | Refills: 0 | Status: DISCONTINUED | OUTPATIENT
Start: 2018-07-10 | End: 2018-07-18

## 2018-07-10 RX ORDER — DEXTROSE 50 % IN WATER 50 %
25 SYRINGE (ML) INTRAVENOUS
Qty: 0 | Refills: 0 | Status: DISCONTINUED | OUTPATIENT
Start: 2018-07-10 | End: 2018-07-11

## 2018-07-10 RX ORDER — POTASSIUM CHLORIDE 20 MEQ
20 PACKET (EA) ORAL ONCE
Qty: 0 | Refills: 0 | Status: COMPLETED | OUTPATIENT
Start: 2018-07-10 | End: 2018-07-10

## 2018-07-10 RX ORDER — MAGNESIUM SULFATE 500 MG/ML
2 VIAL (ML) INJECTION ONCE
Qty: 0 | Refills: 0 | Status: COMPLETED | OUTPATIENT
Start: 2018-07-10 | End: 2018-07-10

## 2018-07-10 RX ORDER — PROPOFOL 10 MG/ML
10 INJECTION, EMULSION INTRAVENOUS
Qty: 500 | Refills: 0 | Status: DISCONTINUED | OUTPATIENT
Start: 2018-07-10 | End: 2018-07-11

## 2018-07-10 RX ORDER — PANTOPRAZOLE SODIUM 20 MG/1
40 TABLET, DELAYED RELEASE ORAL DAILY
Qty: 0 | Refills: 0 | Status: DISCONTINUED | OUTPATIENT
Start: 2018-07-10 | End: 2018-07-12

## 2018-07-10 RX ORDER — VASOPRESSIN 20 [USP'U]/ML
0.03 INJECTION INTRAVENOUS
Qty: 100 | Refills: 0 | Status: DISCONTINUED | OUTPATIENT
Start: 2018-07-10 | End: 2018-07-11

## 2018-07-10 RX ADMIN — Medication 12.5 MILLIGRAM(S): at 06:30

## 2018-07-10 RX ADMIN — DEXMEDETOMIDINE HYDROCHLORIDE IN 0.9% SODIUM CHLORIDE 5.74 MICROGRAM(S)/KG/HR: 4 INJECTION INTRAVENOUS at 20:24

## 2018-07-10 RX ADMIN — Medication 125 MILLILITER(S): at 16:22

## 2018-07-10 RX ADMIN — CHLORHEXIDINE GLUCONATE 1 APPLICATION(S): 213 SOLUTION TOPICAL at 05:44

## 2018-07-10 RX ADMIN — SODIUM CHLORIDE 3 MILLILITER(S): 9 INJECTION INTRAMUSCULAR; INTRAVENOUS; SUBCUTANEOUS at 05:49

## 2018-07-10 RX ADMIN — FENTANYL CITRATE 25 MICROGRAM(S): 50 INJECTION INTRAVENOUS at 18:30

## 2018-07-10 RX ADMIN — REPAGLINIDE 1 MILLIGRAM(S): 1 TABLET ORAL at 05:46

## 2018-07-10 RX ADMIN — Medication 250 MILLIGRAM(S): at 20:25

## 2018-07-10 RX ADMIN — HEPARIN SODIUM 5000 UNIT(S): 5000 INJECTION INTRAVENOUS; SUBCUTANEOUS at 21:43

## 2018-07-10 RX ADMIN — Medication 100 MILLIEQUIVALENT(S): at 23:52

## 2018-07-10 RX ADMIN — Medication 50 GRAM(S): at 18:54

## 2018-07-10 RX ADMIN — FENTANYL CITRATE 25 MICROGRAM(S): 50 INJECTION INTRAVENOUS at 16:23

## 2018-07-10 RX ADMIN — INSULIN HUMAN 2 UNIT(S)/HR: 100 INJECTION, SOLUTION SUBCUTANEOUS at 19:02

## 2018-07-10 RX ADMIN — CHLORHEXIDINE GLUCONATE 10 MILLILITER(S): 213 SOLUTION TOPICAL at 05:44

## 2018-07-10 RX ADMIN — FENTANYL CITRATE 25 MICROGRAM(S): 50 INJECTION INTRAVENOUS at 22:15

## 2018-07-10 RX ADMIN — FENTANYL CITRATE 25 MICROGRAM(S): 50 INJECTION INTRAVENOUS at 21:45

## 2018-07-10 RX ADMIN — FENTANYL CITRATE 25 MICROGRAM(S): 50 INJECTION INTRAVENOUS at 16:55

## 2018-07-10 RX ADMIN — VASOPRESSIN 2 UNIT(S)/MIN: 20 INJECTION INTRAVENOUS at 16:24

## 2018-07-10 RX ADMIN — Medication 125 MILLILITER(S): at 16:21

## 2018-07-10 RX ADMIN — PROPOFOL 3.44 MICROGRAM(S)/KG/MIN: 10 INJECTION, EMULSION INTRAVENOUS at 17:24

## 2018-07-10 RX ADMIN — ATORVASTATIN CALCIUM 80 MILLIGRAM(S): 80 TABLET, FILM COATED ORAL at 21:44

## 2018-07-10 RX ADMIN — HEPARIN SODIUM 5000 UNIT(S): 5000 INJECTION INTRAVENOUS; SUBCUTANEOUS at 05:47

## 2018-07-10 NOTE — BRIEF OPERATIVE NOTE - PROCEDURE
<<-----Click on this checkbox to enter Procedure CABG (coronary artery bypass graft)  07/10/2018  x 3 (LIMA to LAD, SVG to PDA, SVG to Diag), Aorto to Right subclavian bypass, Stapling and divinding of Right subclavian artery. Esophagogastroduodenoscopy by Dr. Kam.  Active  PMAINGON

## 2018-07-10 NOTE — BRIEF OPERATIVE NOTE - PRE-OP DX
Aberrant subclavian artery  07/10/2018    Active  eTss Skelton  Coronary artery disease involving native coronary artery of native heart, angina presence unspecified  07/10/2018    Active  Tess Skelton  Dysphagia, unspecified type  07/10/2018    Active  Tess Skelton

## 2018-07-10 NOTE — PROGRESS NOTE ADULT - SUBJECTIVE AND OBJECTIVE BOX
CTICU  CRITICAL  CARE  attending     Hand off received 					   Pertinent clinical, laboratory, radiographic, hemodynamic, echocardiographic, respiratory data, microbiologic data and chart were reviewed and analyzed frequently throughout the course of the day and night  Patient seen and examined with CTS/ SH attending at bedside      62 year old Male with PMHx significant for HTN, HLD, DM, CVA 5/4/17 resulting in left sided hemiparesis, dysphagia, and PEG placement. Patient lost 110 pounds since his stroke and has been evaluated by various specialists including [facility]LOY[facility]- Dr. George, neuro- Dr. Howell, Gi - Dr. Rosenberg and Dr. Sood and thoracic surgery - Dr. Kam. Patient underwent Videofluoroscopic swallow study on 10/27/17 which revealed pharyngeoesophageal swallow is severely profoundly impaired. Patient underwent high resolution esophageal motility study 1/2/18 revealing achalasia with Type III pattern. S/p EDG with esophageal POEM on 2/15/18 which revealed hypertensive esophagus s/p perioral endoscopic myotomy. 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      CTA Neck and CTA coronaries were performed in 6/2018 and patient was referred to Dr. Boyd by Dr. Kam. On review of the CT angiogram/coronary angiogram, patient has evidence of aberrant right subclavian artery with origin from the proximal arch, the course is posterior to the esophogram, the esophagus is dilated proximal to this. The patient was recommended to Dr. Boyd for consideration of resection of the aberrant subclavian artery, which appears to be causing an anatomic obstruction of the esophagus with proximal dilatation resulting in dysphagia, with concomitant carotid subclavian bypass. The patient presents to [facility] for surgical intervention with Dr. Boyd planned for 7/10/18. Patient denies any swallowing problems in his childhood or prior to CVA. He now reports inability to enjoy life secondary to dysphagia and achalasia. He is now s/p POEM on 2/2018 and Botox injections to UES 5/2018 with no relief of symptoms.   Patient is in no acute distress, denies recent hospitalization, ER visits or surgeries. Denies chest pain, SOB, palpitations, hemoptysis,   N/V/D, abdominal pain, dizziness, fever or chills.  Patient is ambulating with a cane.   CARDIAC CATH: Triple vessel CAD.      HEALTH ISSUES - PROBLEM Dx:  Coronary artery disease: Coronary artery disease  HTN (hypertension): HTN (hypertension)  Dysphagia: Dysphagia      FAMILY HISTORY:  Family history of breast cancer (Sibling)  Family history of malignant neoplasm of ovary (Mother)  Family history of pancreatic cancer (Father)  PAST MEDICAL & SURGICAL HISTORY:  Coronary artery disease  Dysphagia  HLD (hyperlipidemia)  Stroke  Diabetes  HTN (hypertension)  H/O hernia repair: At age 6 months.  S/P cataract surgery  S/P Botox injection: to UES    Patient is a 62y old  Male who presents with a chief complaint of dysphagia and aberrant right subclavian artery (09 July 2018 17:43)      14 system review was unremarkable  acute changes include acute respiratory failure  Vital signs, hemodynamic and respiratory parameters were reviewed from the bedside nursing flow sheet.  ICU Vital Signs Last 24 Hrs  T(C): 36.4 (10 Jul 2018 16:30), Max: 36.6 (10 Jul 2018 01:00)  T(F): 97.5 (10 Jul 2018 16:30), Max: 97.8 (10 Jul 2018 01:00)  HR: 84 (10 Jul 2018 20:30) (54 - 92)  BP: 106/57 (10 Jul 2018 16:45) (106/57 - 145/76)  BP(mean): 82 (10 Jul 2018 16:45) (82 - 104)  ABP: 126/62 (10 Jul 2018 20:30) (100/52 - 140/74)  ABP(mean): 82 (10 Jul 2018 20:30) (66 - 98)  RR: 11 (10 Jul 2018 20:30) (9 - 18)  SpO2: 98% (10 Jul 2018 20:30) (97% - 100%)    Adult Advanced Hemodynamics Last 24 Hrs  CVP(mm Hg): 8 (10 Jul 2018 20:30) (4 - 10)  CVP(cm H2O): --  CO: --  CI: --  PA: --  PA(mean): --  PCWP: --  SVR: --  SVRI: --  PVR: --  PVRI: --, ABG - ( 10 Jul 2018 18:56 )  pH, Arterial: 7.44  pH, Blood: x     /  pCO2: 43    /  pO2: 178   / HCO3: 28    / Base Excess: 3.8   /  SaO2: 99                Mode: AC/ CMV (Assist Control/ Continuous Mandatory Ventilation)  RR (machine): 10  TV (machine): 600  FiO2: 50  PEEP: 5  ITime: 1  MAP: 7  PIP: 20    Intake and output was reviewed and the fluid balance was calculated  Daily     Daily   I&O's Summary    09 Jul 2018 07:01  -  10 Jul 2018 07:00  --------------------------------------------------------  IN: 440 mL / OUT: 850 mL / NET: -410 mL    10 Jul 2018 07:01  -  10 Jul 2018 20:58  --------------------------------------------------------  IN: 2212.7 mL / OUT: 930 mL / NET: 1282.7 mL        All lines and drain sites were assessed  Glycemic trend was reviewed CAPILLARY BLOOD GLUCOSE      POCT Blood Glucose.: 182 mg/dL (10 Jul 2018 20:11)    NEURO: PUPILS 3 mm (Reactive). No acute change in mental status from baseline.  CVS: S1, S2, No S3.  RESPIRATORY: Auscultation of the chest reveals equal breath sounds.  GI: Abdomen is soft. No tenderness. + Bowel sounds.  Extremities are warm and well perfused.  VASCULAR: + Distal pulses.  Wounds appear clean and unremarkable  Antibiotics are perioperative vancomycin.    labs  CBC Full  -  ( 10 Jul 2018 19:00 )  WBC Count : 9.6 K/uL  Hemoglobin : 9.8 g/dL  Hematocrit : 28.9 %  Platelet Count - Automated : 160 K/uL  Mean Cell Volume : 88.7 fL  Mean Cell Hemoglobin : 30.1 pg  Mean Cell Hemoglobin Concentration : 33.9 g/dL  Auto Neutrophil # : x  Auto Lymphocyte # : x  Auto Monocyte # : x  Auto Eosinophil # : x  Auto Basophil # : x  Auto Neutrophil % : x  Auto Lymphocyte % : x  Auto Monocyte % : x  Auto Eosinophil % : x  Auto Basophil % : x    07-10    140  |  100  |  17  ----------------------------<  225<H>  4.4   |  27  |  0.55    Ca    8.1<L>      10 Jul 2018 19:00  Phos  3.6     07-10  Mg     1.9     07-10    TPro  6.1  /  Alb  3.4  /  TBili  0.2  /  DBili  x   /  AST  19  /  ALT  13  /  AlkPhos  54  07-10    PT/INR - ( 10 Jul 2018 19:00 )   PT: 14.2 sec;   INR: 1.27          PTT - ( 10 Jul 2018 19:00 )  PTT:68.4 sec  The current medications were reviewed   MEDICATIONS  (STANDING):  aspirin enteric coated 81 milliGRAM(s) Oral daily  atorvastatin 80 milliGRAM(s) Oral at bedtime  clevidipine Infusion 2 mG/Hr (4 mL/Hr) IV Continuous <Continuous>  dexmedetomidine Infusion 0.4 MICROgram(s)/kG/Hr (5.74 mL/Hr) IV Continuous <Continuous>  dextrose 50% Injectable 50 milliLiter(s) IV Push every 15 minutes  dextrose 50% Injectable 25 milliLiter(s) IV Push every 15 minutes  heparin  Injectable 5000 Unit(s) SubCutaneous every 8 hours  insulin Infusion 2 Unit(s)/Hr (2 mL/Hr) IV Continuous <Continuous>  pantoprazole  Injectable 40 milliGRAM(s) IV Push daily  propofol Infusion 10 MICROgram(s)/kG/Min (3.444 mL/Hr) IV Continuous <Continuous>  repaglinide 1 milliGRAM(s) Oral three times a day  sodium chloride 0.9%. 1000 milliLiter(s) (10 mL/Hr) IV Continuous <Continuous>  vancomycin  IVPB 1000 milliGRAM(s) IV Intermittent every 12 hours  vasopressin Infusion 0.033 Unit(s)/Min (2 mL/Hr) IV Continuous <Continuous>    MEDICATIONS  (PRN):       PROBLEM LIST/ ASSESSMENT:  HEALTH ISSUES - PROBLEM Dx:  Coronary artery disease: Coronary artery disease  HTN (hypertension): HTN (hypertension)  Dysphagia: Dysphagia        Patient is a 62y old  Male who initially  presents with dysphagia caused by aberrant right subclavian artery.  Further work up: Triple vessel CAD.  S/P CABG X3.  S/P Aorto Right subclavian bypass.  Hemodynamically stable.  Good oxygenation.  Diuresing well.  Overall doing well.           My plan includes :  Discontinue pressors.  D/C sedation.  Close hemodynamic, ventilatory and drain monitoring and management.  WEAN to Extubate.  Monitor for arrhythmias and monitor parameters for organ perfusion  Monitor neurologic status  Head of the bed should remain elevated to 45 deg .   Chest PT and IS will be encouraged  Monitor adequacy of oxygenation and ventilation and attempt to wean oxygen  Nutritional goals will be met using po eventually , ensure adequate caloric intake and monitor  the same  Stress ulcer and VTE prophylaxis will be achieved    OPTIMIZE  Glycemic control to  satisfactory levels.  Electrolytes have been repleted as necessary and wound care has been carried out. Pain control has been achieved.   Aggressive  physical therapy and early mobility and ambulation goals will be met   The family was updated about the course and plan  CRITICAL CARE TIME SPENT in evaluation and management, reassessments, review and interpretation of labs and x-rays, ventilator and hemodynamic management, formulating a plan and coordinating care: ___30____ MIN.  Time does not include procedural time.  CTICU ATTENDING     					    Juan Diego Rowe MD

## 2018-07-10 NOTE — BRIEF OPERATIVE NOTE - POST-OP DX
Aberrant coronary artery  07/10/2018    Active  Tess Skelton  Coronary artery disease involving native coronary artery of native heart, angina presence unspecified  07/10/2018    Active  Tess Skelton  Dysphagia, unspecified type  07/10/2018    Active  Tess Skelton

## 2018-07-11 LAB
ALBUMIN SERPL ELPH-MCNC: 3.4 G/DL — SIGNIFICANT CHANGE UP (ref 3.3–5)
ALBUMIN SERPL ELPH-MCNC: 3.5 G/DL — SIGNIFICANT CHANGE UP (ref 3.3–5)
ALBUMIN SERPL ELPH-MCNC: 3.7 G/DL — SIGNIFICANT CHANGE UP (ref 3.3–5)
ALP SERPL-CCNC: 30 U/L — LOW (ref 40–120)
ALP SERPL-CCNC: 39 U/L — LOW (ref 40–120)
ALP SERPL-CCNC: 40 U/L — SIGNIFICANT CHANGE UP (ref 40–120)
ALT FLD-CCNC: 11 U/L — SIGNIFICANT CHANGE UP (ref 10–45)
ALT FLD-CCNC: 13 U/L — SIGNIFICANT CHANGE UP (ref 10–45)
ALT FLD-CCNC: 14 U/L — SIGNIFICANT CHANGE UP (ref 10–45)
ANION GAP SERPL CALC-SCNC: 11 MMOL/L — SIGNIFICANT CHANGE UP (ref 5–17)
ANION GAP SERPL CALC-SCNC: 12 MMOL/L — SIGNIFICANT CHANGE UP (ref 5–17)
ANION GAP SERPL CALC-SCNC: 8 MMOL/L — SIGNIFICANT CHANGE UP (ref 5–17)
APTT BLD: 32.2 SEC — SIGNIFICANT CHANGE UP (ref 27.5–37.4)
APTT BLD: 37.2 SEC — SIGNIFICANT CHANGE UP (ref 27.5–37.4)
AST SERPL-CCNC: 23 U/L — SIGNIFICANT CHANGE UP (ref 10–40)
AST SERPL-CCNC: 27 U/L — SIGNIFICANT CHANGE UP (ref 10–40)
AST SERPL-CCNC: 28 U/L — SIGNIFICANT CHANGE UP (ref 10–40)
BASE EXCESS BLDA CALC-SCNC: 3 MMOL/L — SIGNIFICANT CHANGE UP (ref -2–3)
BILIRUB DIRECT SERPL-MCNC: 0.2 MG/DL — SIGNIFICANT CHANGE UP (ref 0–0.2)
BILIRUB INDIRECT FLD-MCNC: 1 MG/DL — SIGNIFICANT CHANGE UP (ref 0.2–1)
BILIRUB SERPL-MCNC: 0.8 MG/DL — SIGNIFICANT CHANGE UP (ref 0.2–1.2)
BILIRUB SERPL-MCNC: 1.2 MG/DL — SIGNIFICANT CHANGE UP (ref 0.2–1.2)
BILIRUB SERPL-MCNC: 1.5 MG/DL — HIGH (ref 0.2–1.2)
BUN SERPL-MCNC: 18 MG/DL — SIGNIFICANT CHANGE UP (ref 7–23)
BUN SERPL-MCNC: 21 MG/DL — SIGNIFICANT CHANGE UP (ref 7–23)
BUN SERPL-MCNC: 26 MG/DL — HIGH (ref 7–23)
CALCIUM SERPL-MCNC: 8.4 MG/DL — SIGNIFICANT CHANGE UP (ref 8.4–10.5)
CALCIUM SERPL-MCNC: 8.4 MG/DL — SIGNIFICANT CHANGE UP (ref 8.4–10.5)
CALCIUM SERPL-MCNC: 9 MG/DL — SIGNIFICANT CHANGE UP (ref 8.4–10.5)
CHLORIDE SERPL-SCNC: 104 MMOL/L — SIGNIFICANT CHANGE UP (ref 96–108)
CHLORIDE SERPL-SCNC: 96 MMOL/L — SIGNIFICANT CHANGE UP (ref 96–108)
CHLORIDE SERPL-SCNC: 98 MMOL/L — SIGNIFICANT CHANGE UP (ref 96–108)
CO2 SERPL-SCNC: 28 MMOL/L — SIGNIFICANT CHANGE UP (ref 22–31)
CO2 SERPL-SCNC: 28 MMOL/L — SIGNIFICANT CHANGE UP (ref 22–31)
CO2 SERPL-SCNC: 29 MMOL/L — SIGNIFICANT CHANGE UP (ref 22–31)
CREAT SERPL-MCNC: 0.55 MG/DL — SIGNIFICANT CHANGE UP (ref 0.5–1.3)
CREAT SERPL-MCNC: 0.73 MG/DL — SIGNIFICANT CHANGE UP (ref 0.5–1.3)
CREAT SERPL-MCNC: 0.91 MG/DL — SIGNIFICANT CHANGE UP (ref 0.5–1.3)
GAS PNL BLDA: SIGNIFICANT CHANGE UP
GLUCOSE BLDC GLUCOMTR-MCNC: 104 MG/DL — HIGH (ref 70–99)
GLUCOSE BLDC GLUCOMTR-MCNC: 107 MG/DL — HIGH (ref 70–99)
GLUCOSE BLDC GLUCOMTR-MCNC: 136 MG/DL — HIGH (ref 70–99)
GLUCOSE BLDC GLUCOMTR-MCNC: 137 MG/DL — HIGH (ref 70–99)
GLUCOSE BLDC GLUCOMTR-MCNC: 168 MG/DL — HIGH (ref 70–99)
GLUCOSE BLDC GLUCOMTR-MCNC: 180 MG/DL — HIGH (ref 70–99)
GLUCOSE BLDC GLUCOMTR-MCNC: 78 MG/DL — SIGNIFICANT CHANGE UP (ref 70–99)
GLUCOSE BLDC GLUCOMTR-MCNC: 89 MG/DL — SIGNIFICANT CHANGE UP (ref 70–99)
GLUCOSE SERPL-MCNC: 158 MG/DL — HIGH (ref 70–99)
GLUCOSE SERPL-MCNC: 174 MG/DL — HIGH (ref 70–99)
GLUCOSE SERPL-MCNC: 84 MG/DL — SIGNIFICANT CHANGE UP (ref 70–99)
HCO3 BLDA-SCNC: 29 MMOL/L — HIGH (ref 21–28)
HCT VFR BLD CALC: 26.8 % — LOW (ref 39–50)
HCT VFR BLD CALC: 29.2 % — LOW (ref 39–50)
HCT VFR BLD CALC: 31.7 % — LOW (ref 39–50)
HGB BLD-MCNC: 10.1 G/DL — LOW (ref 13–17)
HGB BLD-MCNC: 10.6 G/DL — LOW (ref 13–17)
HGB BLD-MCNC: 9.1 G/DL — LOW (ref 13–17)
INR BLD: 1.2 — HIGH (ref 0.88–1.16)
INR BLD: 1.24 — HIGH (ref 0.88–1.16)
INR BLD: 1.36 — HIGH (ref 0.88–1.16)
LACTATE SERPL-SCNC: 1.7 MMOL/L — SIGNIFICANT CHANGE UP (ref 0.5–2)
LACTATE SERPL-SCNC: 2.2 MMOL/L — HIGH (ref 0.5–2)
MAGNESIUM SERPL-MCNC: 2 MG/DL — SIGNIFICANT CHANGE UP (ref 1.6–2.6)
MAGNESIUM SERPL-MCNC: 2.1 MG/DL — SIGNIFICANT CHANGE UP (ref 1.6–2.6)
MAGNESIUM SERPL-MCNC: 2.3 MG/DL — SIGNIFICANT CHANGE UP (ref 1.6–2.6)
MCHC RBC-ENTMCNC: 29.3 PG — SIGNIFICANT CHANGE UP (ref 27–34)
MCHC RBC-ENTMCNC: 29.7 PG — SIGNIFICANT CHANGE UP (ref 27–34)
MCHC RBC-ENTMCNC: 29.9 PG — SIGNIFICANT CHANGE UP (ref 27–34)
MCHC RBC-ENTMCNC: 33.4 G/DL — SIGNIFICANT CHANGE UP (ref 32–36)
MCHC RBC-ENTMCNC: 34 G/DL — SIGNIFICANT CHANGE UP (ref 32–36)
MCHC RBC-ENTMCNC: 34.6 G/DL — SIGNIFICANT CHANGE UP (ref 32–36)
MCV RBC AUTO: 86.4 FL — SIGNIFICANT CHANGE UP (ref 80–100)
MCV RBC AUTO: 87.6 FL — SIGNIFICANT CHANGE UP (ref 80–100)
MCV RBC AUTO: 87.6 FL — SIGNIFICANT CHANGE UP (ref 80–100)
PCO2 BLDA: 51 MMHG — HIGH (ref 35–48)
PH BLDA: 7.37 — SIGNIFICANT CHANGE UP (ref 7.35–7.45)
PHOSPHATE SERPL-MCNC: 3.4 MG/DL — SIGNIFICANT CHANGE UP (ref 2.5–4.5)
PHOSPHATE SERPL-MCNC: 4.8 MG/DL — HIGH (ref 2.5–4.5)
PLATELET # BLD AUTO: 149 K/UL — LOW (ref 150–400)
PLATELET # BLD AUTO: 175 K/UL — SIGNIFICANT CHANGE UP (ref 150–400)
PLATELET # BLD AUTO: 183 K/UL — SIGNIFICANT CHANGE UP (ref 150–400)
PO2 BLDA: 114 MMHG — HIGH (ref 83–108)
POTASSIUM SERPL-MCNC: 4.3 MMOL/L — SIGNIFICANT CHANGE UP (ref 3.5–5.3)
POTASSIUM SERPL-MCNC: 4.4 MMOL/L — SIGNIFICANT CHANGE UP (ref 3.5–5.3)
POTASSIUM SERPL-MCNC: 4.5 MMOL/L — SIGNIFICANT CHANGE UP (ref 3.5–5.3)
POTASSIUM SERPL-SCNC: 4.3 MMOL/L — SIGNIFICANT CHANGE UP (ref 3.5–5.3)
POTASSIUM SERPL-SCNC: 4.4 MMOL/L — SIGNIFICANT CHANGE UP (ref 3.5–5.3)
POTASSIUM SERPL-SCNC: 4.5 MMOL/L — SIGNIFICANT CHANGE UP (ref 3.5–5.3)
PROT SERPL-MCNC: 4.7 G/DL — LOW (ref 6–8.3)
PROT SERPL-MCNC: 5.7 G/DL — LOW (ref 6–8.3)
PROT SERPL-MCNC: 5.9 G/DL — LOW (ref 6–8.3)
PROTHROM AB SERPL-ACNC: 13.4 SEC — HIGH (ref 9.8–12.7)
PROTHROM AB SERPL-ACNC: 13.8 SEC — HIGH (ref 9.8–12.7)
PROTHROM AB SERPL-ACNC: 15.2 SEC — HIGH (ref 9.8–12.7)
RBC # BLD: 3.06 M/UL — LOW (ref 4.2–5.8)
RBC # BLD: 3.38 M/UL — LOW (ref 4.2–5.8)
RBC # BLD: 3.62 M/UL — LOW (ref 4.2–5.8)
RBC # FLD: 17.2 % — HIGH (ref 10.3–16.9)
RBC # FLD: 17.7 % — HIGH (ref 10.3–16.9)
RBC # FLD: 18.3 % — HIGH (ref 10.3–16.9)
SAO2 % BLDA: 98 % — SIGNIFICANT CHANGE UP (ref 95–100)
SODIUM SERPL-SCNC: 136 MMOL/L — SIGNIFICANT CHANGE UP (ref 135–145)
SODIUM SERPL-SCNC: 137 MMOL/L — SIGNIFICANT CHANGE UP (ref 135–145)
SODIUM SERPL-SCNC: 141 MMOL/L — SIGNIFICANT CHANGE UP (ref 135–145)
WBC # BLD: 10.4 K/UL — SIGNIFICANT CHANGE UP (ref 3.8–10.5)
WBC # BLD: 12.5 K/UL — HIGH (ref 3.8–10.5)
WBC # BLD: 6.7 K/UL — SIGNIFICANT CHANGE UP (ref 3.8–10.5)
WBC # FLD AUTO: 10.4 K/UL — SIGNIFICANT CHANGE UP (ref 3.8–10.5)
WBC # FLD AUTO: 12.5 K/UL — HIGH (ref 3.8–10.5)
WBC # FLD AUTO: 6.7 K/UL — SIGNIFICANT CHANGE UP (ref 3.8–10.5)

## 2018-07-11 PROCEDURE — 71045 X-RAY EXAM CHEST 1 VIEW: CPT | Mod: 26

## 2018-07-11 PROCEDURE — 99291 CRITICAL CARE FIRST HOUR: CPT

## 2018-07-11 RX ORDER — CALCIUM GLUCONATE 100 MG/ML
2 VIAL (ML) INTRAVENOUS ONCE
Qty: 0 | Refills: 0 | Status: COMPLETED | OUTPATIENT
Start: 2018-07-11 | End: 2018-07-11

## 2018-07-11 RX ORDER — FUROSEMIDE 40 MG
20 TABLET ORAL ONCE
Qty: 0 | Refills: 0 | Status: COMPLETED | OUTPATIENT
Start: 2018-07-11 | End: 2018-07-11

## 2018-07-11 RX ORDER — GLUCAGON INJECTION, SOLUTION 0.5 MG/.1ML
1 INJECTION, SOLUTION SUBCUTANEOUS ONCE
Qty: 0 | Refills: 0 | Status: DISCONTINUED | OUTPATIENT
Start: 2018-07-11 | End: 2018-07-18

## 2018-07-11 RX ORDER — SODIUM CHLORIDE 9 MG/ML
1000 INJECTION, SOLUTION INTRAVENOUS
Qty: 0 | Refills: 0 | Status: DISCONTINUED | OUTPATIENT
Start: 2018-07-11 | End: 2018-07-18

## 2018-07-11 RX ORDER — METOPROLOL TARTRATE 50 MG
12.5 TABLET ORAL EVERY 12 HOURS
Qty: 0 | Refills: 0 | Status: DISCONTINUED | OUTPATIENT
Start: 2018-07-11 | End: 2018-07-13

## 2018-07-11 RX ORDER — DEXTROSE 50 % IN WATER 50 %
12.5 SYRINGE (ML) INTRAVENOUS ONCE
Qty: 0 | Refills: 0 | Status: DISCONTINUED | OUTPATIENT
Start: 2018-07-11 | End: 2018-07-18

## 2018-07-11 RX ORDER — ALBUMIN HUMAN 25 %
250 VIAL (ML) INTRAVENOUS
Qty: 0 | Refills: 0 | Status: COMPLETED | OUTPATIENT
Start: 2018-07-11 | End: 2018-07-12

## 2018-07-11 RX ORDER — ACETAMINOPHEN 500 MG
1000 TABLET ORAL ONCE
Qty: 0 | Refills: 0 | Status: COMPLETED | OUTPATIENT
Start: 2018-07-11 | End: 2018-07-11

## 2018-07-11 RX ORDER — FUROSEMIDE 40 MG
10 TABLET ORAL ONCE
Qty: 0 | Refills: 0 | Status: COMPLETED | OUTPATIENT
Start: 2018-07-11 | End: 2018-07-11

## 2018-07-11 RX ORDER — INSULIN LISPRO 100/ML
VIAL (ML) SUBCUTANEOUS
Qty: 0 | Refills: 0 | Status: DISCONTINUED | OUTPATIENT
Start: 2018-07-11 | End: 2018-07-18

## 2018-07-11 RX ORDER — DEXTROSE 50 % IN WATER 50 %
25 SYRINGE (ML) INTRAVENOUS ONCE
Qty: 0 | Refills: 0 | Status: DISCONTINUED | OUTPATIENT
Start: 2018-07-11 | End: 2018-07-18

## 2018-07-11 RX ORDER — DEXTROSE 50 % IN WATER 50 %
15 SYRINGE (ML) INTRAVENOUS ONCE
Qty: 0 | Refills: 0 | Status: DISCONTINUED | OUTPATIENT
Start: 2018-07-11 | End: 2018-07-18

## 2018-07-11 RX ADMIN — Medication 10 MILLIGRAM(S): at 03:09

## 2018-07-11 RX ADMIN — Medication 1000 MILLIGRAM(S): at 18:55

## 2018-07-11 RX ADMIN — HEPARIN SODIUM 5000 UNIT(S): 5000 INJECTION INTRAVENOUS; SUBCUTANEOUS at 22:37

## 2018-07-11 RX ADMIN — Medication 12.5 MILLIGRAM(S): at 22:38

## 2018-07-11 RX ADMIN — HEPARIN SODIUM 5000 UNIT(S): 5000 INJECTION INTRAVENOUS; SUBCUTANEOUS at 07:02

## 2018-07-11 RX ADMIN — HEPARIN SODIUM 5000 UNIT(S): 5000 INJECTION INTRAVENOUS; SUBCUTANEOUS at 14:36

## 2018-07-11 RX ADMIN — PANTOPRAZOLE SODIUM 40 MILLIGRAM(S): 20 TABLET, DELAYED RELEASE ORAL at 12:43

## 2018-07-11 RX ADMIN — Medication 2: at 23:24

## 2018-07-11 RX ADMIN — Medication 250 MILLIGRAM(S): at 20:08

## 2018-07-11 RX ADMIN — Medication 20 MILLIGRAM(S): at 19:10

## 2018-07-11 RX ADMIN — Medication 250 MILLIGRAM(S): at 07:01

## 2018-07-11 RX ADMIN — Medication 200 GRAM(S): at 02:31

## 2018-07-11 RX ADMIN — Medication 81 MILLIGRAM(S): at 12:43

## 2018-07-11 RX ADMIN — Medication 20 MILLIGRAM(S): at 07:02

## 2018-07-11 RX ADMIN — Medication 400 MILLIGRAM(S): at 18:55

## 2018-07-11 RX ADMIN — ATORVASTATIN CALCIUM 80 MILLIGRAM(S): 80 TABLET, FILM COATED ORAL at 22:37

## 2018-07-11 RX ADMIN — Medication 2: at 17:51

## 2018-07-11 RX ADMIN — Medication 20 MILLIGRAM(S): at 12:43

## 2018-07-11 NOTE — AIRWAY REMOVAL NOTE  ADULT & PEDS - ARTIFICAL AIRWAY REMOVAL COMMENTS
extubated per PA Lashayner order, pt safe and stable, oriented, calm, no respiratory distress. cont. to monitor.

## 2018-07-11 NOTE — PHYSICAL THERAPY INITIAL EVALUATION ADULT - PERTINENT HX OF CURRENT PROBLEM, REHAB EVAL
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 The patient was recommended to Dr. Boyd for consideration of resection of the aberrant subclavian artery, which appears to be causing an anatomic obstruction of the esophagus with proximal dilatation resulting in dysphagia, with concomitant carotid subclavian bypass.

## 2018-07-11 NOTE — PHYSICAL THERAPY INITIAL EVALUATION ADULT - BED MOBILITY LIMITATIONS, REHAB EVAL
What Is The Reason For Today's Visit?: Skin Lesions What Is The Reason For Today's Visit? (Being Monitored For X): concerning skin lesions on an annual basis Not assessed, pt received/returned seated in the chair

## 2018-07-11 NOTE — PHYSICAL THERAPY INITIAL EVALUATION ADULT - GENERAL OBSERVATIONS, REHAB EVAL
Pt received seated in chair, +TPM, +CT to suction (suction removed prior to ambulation,) +donnie drains x2, +6L NC O2, +sternal incision bandage C/D/I, +RIJ RLC heplock, +vazquez, +telemetry, +pulse ox, +b/l SCDs, NAD, agreeable to PT.

## 2018-07-11 NOTE — PHYSICAL THERAPY INITIAL EVALUATION ADULT - ACTIVE RANGE OF MOTION EXAMINATION, REHAB EVAL
bilateral  lower extremity Active ROM was WFL (within functional limits)/b/l UE AROM WFL with exception of shoulder flex/abd limited secondary to sternal precautions

## 2018-07-11 NOTE — PHYSICAL THERAPY INITIAL EVALUATION ADULT - ADDITIONAL COMMENTS
Pt lives with his wife in an elevator access apartment. States that he intermittently uses RW for community ambulation, however the majority of the time ambulates with no AD. Pt states that his wife is home to help him as needed.

## 2018-07-11 NOTE — PHYSICAL THERAPY INITIAL EVALUATION ADULT - DID THE PATIENT HAVE SURGERY?
CABGx3, Aorto to Right subclavian bypass, Stapling and divinding of Right subclavian artery. Esophagogastroduodenoscopy./yes

## 2018-07-11 NOTE — PROGRESS NOTE ADULT - SUBJECTIVE AND OBJECTIVE BOX
CTICU  CRITICAL  CARE  attending     Hand off received @ 7a					   Pertinent clinical, laboratory, radiographic, hemodynamic, echocardiographic, respiratory data, microbiologic data and chart were reviewed and analyzed frequently throughout the course of the day and night  Patient seen and examined with CTS/ SH attending at bedside    Pt is a 62y , Male, post op day # 1 s/p CABG (coronary artery bypass graft)   x 3 (LIMA to LAD, SVG to PDA, SVG to Diag), Aorto to Right subclavian bypass, Stapling and divinding of Right subclavian artery. 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    today:    extubated early am  diuresed  NPO  starting PEG feeds      Coronary artery disease: Coronary artery disease  HTN (hypertension): HTN (hypertension)  Dysphagia: Dysphagia      , FAMILY HISTORY:  Family history of breast cancer (Sibling)  Family history of malignant neoplasm of ovary (Mother)  Family history of pancreatic cancer (Father)  PAST MEDICAL & SURGICAL HISTORY:  Coronary artery disease  Dysphagia  HLD (hyperlipidemia)  Stroke  Diabetes  HTN (hypertension)  H/O hernia repair: At age 6 months.  S/P cataract surgery  S/P Botox injection: to [facility]    Patient is a 62y old  Male who presents with a chief complaint of dysphagia and aberrant right subclavian artery (09 Jul 2018 17:43)      14 system review was unremarkable  acute changes include acute respiratory failure  Vital signs, hemodynamic and respiratory parameters were reviewed from the bedside nursing flowsheet.  ICU Vital Signs Last 24 Hrs  T(C): 36.7 (11 Jul 2018 14:00), Max: 37.2 (11 Jul 2018 05:00)  T(F): 98.1 (11 Jul 2018 14:00), Max: 98.9 (11 Jul 2018 05:00)  HR: 88 (11 Jul 2018 16:00) (66 - 92)  BP: 124/73 (11 Jul 2018 16:00) (106/57 - 124/73)  BP(mean): 82 (10 Jul 2018 16:45) (82 - 82)  ABP: 158/72 (11 Jul 2018 16:00) (94/52 - 160/76)  ABP(mean): 98 (11 Jul 2018 16:00) (64 - 102)  RR: 24 (11 Jul 2018 16:00) (9 - 27)  SpO2: 100% (11 Jul 2018 16:00) (97% - 100%)    Adult Advanced Hemodynamics Last 24 Hrs  CVP(mm Hg): 3 (11 Jul 2018 08:00) (2 - 16)  CVP(cm H2O): --  CO: --  CI: --  PA: --  PA(mean): --  PCWP: --  SVR: --  SVRI: --  PVR: --  PVRI: --, ABG - ( 11 Jul 2018 13:35 )  pH, Arterial: 7.42  pH, Blood: x     /  pCO2: 44    /  pO2: 160   / HCO3: 28    / Base Excess: 2.9   /  SaO2: 99                Mode: standby    Intake and output was reviewed and the fluid balance was calculated  Daily     Daily   I&O's Summary    10 Jul 2018 07:01  -  11 Jul 2018 07:00  --------------------------------------------------------  IN: 3301.8 mL / OUT: 2080 mL / NET: 1221.8 mL    11 Jul 2018 07:01  -  11 Jul 2018 16:11  --------------------------------------------------------  IN: 280 mL / OUT: 720 mL / NET: -440 mL        All lines and drain sites were assessed  Glycemic trend was reviewedCAPILLARY BLOOD GLUCOSE      POCT Blood Glucose.: 107 mg/dL (11 Jul 2018 12:34)    No acute change in mental status  Auscultation of the chest reveals equal bs  Abdomen is soft  Extremities are warm and well perfused  Wounds appear clean and unremarkable  Antibiotics are periop    labs  CBC Full  -  ( 11 Jul 2018 13:35 )  WBC Count : 10.4 K/uL  Hemoglobin : 10.6 g/dL  Hematocrit : 31.7 %  Platelet Count - Automated : 183 K/uL  Mean Cell Volume : 87.6 fL  Mean Cell Hemoglobin : 29.3 pg  Mean Cell Hemoglobin Concentration : 33.4 g/dL  Auto Neutrophil # : x  Auto Lymphocyte # : x  Auto Monocyte # : x  Auto Eosinophil # : x  Auto Basophil # : x  Auto Neutrophil % : x  Auto Lymphocyte % : x  Auto Monocyte % : x  Auto Eosinophil % : x  Auto Basophil % : x    07-11    136  |  96  |  21  ----------------------------<  158<H>  4.3   |  28  |  0.73    Ca    9.0      11 Jul 2018 13:35  Phos  3.4     07-11  Mg     2.1     07-11    TPro  5.9<L>  /  Alb  3.7  /  TBili  1.2  /  DBili  0.2  /  AST  27  /  ALT  14  /  AlkPhos  40  07-11    PT/INR - ( 11 Jul 2018 13:35 )   PT: 13.8 sec;   INR: 1.24          PTT - ( 11 Jul 2018 13:35 )  PTT:32.2 sec  The current medications were reviewed   MEDICATIONS  (STANDING):  aspirin enteric coated 81 milliGRAM(s) Oral daily  atorvastatin 80 milliGRAM(s) Oral at bedtime  dextrose 5%. 1000 milliLiter(s) (50 mL/Hr) IV Continuous <Continuous>  dextrose 50% Injectable 12.5 Gram(s) IV Push once  dextrose 50% Injectable 25 Gram(s) IV Push once  dextrose 50% Injectable 25 Gram(s) IV Push once  heparin  Injectable 5000 Unit(s) SubCutaneous every 8 hours  insulin lispro (HumaLOG) corrective regimen sliding scale   SubCutaneous Before meals and at bedtime  pantoprazole  Injectable 40 milliGRAM(s) IV Push daily  sodium chloride 0.9%. 1000 milliLiter(s) (10 mL/Hr) IV Continuous <Continuous>  vancomycin  IVPB 1000 milliGRAM(s) IV Intermittent every 12 hours    MEDICATIONS  (PRN):  dextrose 40% Gel 15 Gram(s) Oral once PRN Blood Glucose LESS THAN 70 milliGRAM(s)/deciliter  glucagon  Injectable 1 milliGRAM(s) IntraMuscular once PRN Glucose LESS THAN 70 milligrams/deciliter       PROBLEM LIST/ ASSESSMENT:  HEALTH ISSUES - PROBLEM Dx:  s/p CABG  s/p Aorto-Right Subclavian bypass  Coronary artery disease: Coronary artery disease  HTN (hypertension): HTN (hypertension)  Dysphagia: Dysphagia      ,   Patient is a 62y old  Male who presents with a chief complaint of dysphagia and aberrant right subclavian artery (09 Jul 2018 17:43)     s/p  CABG (coronary artery bypass graft)   x 3 (LIMA to LAD, SVG to PDA, SVG to Diag), Aorto to Right subclavian bypass, Stapling and divinding of Right subclavian artery. Esophagogastroduodenoscopy    My plan includes :  close hemodynamic, ventilatory and drain monitoring and management per post op routine  Speech and Swallow testing tomorrow; Barium esophagogram on Friday  PEG feeds to start today  Monitor for arrhythmias and monitor parameters for organ perfusion  monitor neurologic status  Head of the bed should remain elevated to 45 deg .   chest PT and IS will be encouraged  monitor adequacy of oxygenation and ventilation and attempt to wean oxygen  Nutritional goals will be met using po eventually , ensure adequate caloric intake and montior the same  Stress ulcer and VTE prophylaxis will be achieved    Glycemic control is satisfactory  Electrolytes have been repleted as necessary and wound care has been carried out. Pain control has been achieved.   agressive physical therapy and early mobility and ambulation goals will be met   The family was updated about the course and plan  CRITICAL CARE TIME SPENT in evaluation and management, reassessments, review and interpretation of labs and x-rays, ventilator and hemodynamic management, formulating a plan and coordinating care: __45___ MIN.  Time does not include procedural time.  CTICU ATTENDING     					    Jason Marc MD

## 2018-07-12 LAB
ALBUMIN SERPL ELPH-MCNC: 3.5 G/DL — SIGNIFICANT CHANGE UP (ref 3.3–5)
ALBUMIN SERPL ELPH-MCNC: 3.8 G/DL — SIGNIFICANT CHANGE UP (ref 3.3–5)
ALBUMIN SERPL ELPH-MCNC: 3.8 G/DL — SIGNIFICANT CHANGE UP (ref 3.3–5)
ALP SERPL-CCNC: 36 U/L — LOW (ref 40–120)
ALP SERPL-CCNC: 41 U/L — SIGNIFICANT CHANGE UP (ref 40–120)
ALP SERPL-CCNC: 60 U/L — SIGNIFICANT CHANGE UP (ref 40–120)
ALT FLD-CCNC: 12 U/L — SIGNIFICANT CHANGE UP (ref 10–45)
ALT FLD-CCNC: 12 U/L — SIGNIFICANT CHANGE UP (ref 10–45)
ALT FLD-CCNC: 14 U/L — SIGNIFICANT CHANGE UP (ref 10–45)
ANION GAP SERPL CALC-SCNC: 11 MMOL/L — SIGNIFICANT CHANGE UP (ref 5–17)
ANION GAP SERPL CALC-SCNC: 12 MMOL/L — SIGNIFICANT CHANGE UP (ref 5–17)
ANION GAP SERPL CALC-SCNC: 12 MMOL/L — SIGNIFICANT CHANGE UP (ref 5–17)
APTT BLD: 32.3 SEC — SIGNIFICANT CHANGE UP (ref 27.5–37.4)
APTT BLD: 32.6 SEC — SIGNIFICANT CHANGE UP (ref 27.5–37.4)
APTT BLD: 34.8 SEC — SIGNIFICANT CHANGE UP (ref 27.5–37.4)
APTT BLD: 37.6 SEC — HIGH (ref 27.5–37.4)
AST SERPL-CCNC: 19 U/L — SIGNIFICANT CHANGE UP (ref 10–40)
AST SERPL-CCNC: 22 U/L — SIGNIFICANT CHANGE UP (ref 10–40)
AST SERPL-CCNC: 30 U/L — SIGNIFICANT CHANGE UP (ref 10–40)
BASE EXCESS BLDV CALC-SCNC: 5.3 MMOL/L — SIGNIFICANT CHANGE UP
BILIRUB DIRECT SERPL-MCNC: <0.2 MG/DL — SIGNIFICANT CHANGE UP (ref 0–0.2)
BILIRUB INDIRECT FLD-MCNC: >0.7 MG/DL — SIGNIFICANT CHANGE UP (ref 0.2–1)
BILIRUB SERPL-MCNC: 0.8 MG/DL — SIGNIFICANT CHANGE UP (ref 0.2–1.2)
BILIRUB SERPL-MCNC: 0.9 MG/DL — SIGNIFICANT CHANGE UP (ref 0.2–1.2)
BILIRUB SERPL-MCNC: 1 MG/DL — SIGNIFICANT CHANGE UP (ref 0.2–1.2)
BUN SERPL-MCNC: 26 MG/DL — HIGH (ref 7–23)
BUN SERPL-MCNC: 27 MG/DL — HIGH (ref 7–23)
BUN SERPL-MCNC: 28 MG/DL — HIGH (ref 7–23)
CALCIUM SERPL-MCNC: 8.6 MG/DL — SIGNIFICANT CHANGE UP (ref 8.4–10.5)
CALCIUM SERPL-MCNC: 8.6 MG/DL — SIGNIFICANT CHANGE UP (ref 8.4–10.5)
CALCIUM SERPL-MCNC: 8.9 MG/DL — SIGNIFICANT CHANGE UP (ref 8.4–10.5)
CHLORIDE SERPL-SCNC: 98 MMOL/L — SIGNIFICANT CHANGE UP (ref 96–108)
CO2 SERPL-SCNC: 30 MMOL/L — SIGNIFICANT CHANGE UP (ref 22–31)
CREAT SERPL-MCNC: 0.62 MG/DL — SIGNIFICANT CHANGE UP (ref 0.5–1.3)
CREAT SERPL-MCNC: 0.64 MG/DL — SIGNIFICANT CHANGE UP (ref 0.5–1.3)
CREAT SERPL-MCNC: 0.83 MG/DL — SIGNIFICANT CHANGE UP (ref 0.5–1.3)
GAS PNL BLDA: SIGNIFICANT CHANGE UP
GAS PNL BLDV: SIGNIFICANT CHANGE UP
GLUCOSE BLDC GLUCOMTR-MCNC: 108 MG/DL — HIGH (ref 70–99)
GLUCOSE BLDC GLUCOMTR-MCNC: 126 MG/DL — HIGH (ref 70–99)
GLUCOSE BLDC GLUCOMTR-MCNC: 178 MG/DL — HIGH (ref 70–99)
GLUCOSE SERPL-MCNC: 141 MG/DL — HIGH (ref 70–99)
GLUCOSE SERPL-MCNC: 157 MG/DL — HIGH (ref 70–99)
GLUCOSE SERPL-MCNC: 161 MG/DL — HIGH (ref 70–99)
HCO3 BLDV-SCNC: 32 MMOL/L — HIGH (ref 20–27)
HCT VFR BLD CALC: 24.3 % — LOW (ref 39–50)
HCT VFR BLD CALC: 24.9 % — LOW (ref 39–50)
HCT VFR BLD CALC: 28.2 % — LOW (ref 39–50)
HGB BLD-MCNC: 8 G/DL — LOW (ref 13–17)
HGB BLD-MCNC: 8.3 G/DL — LOW (ref 13–17)
HGB BLD-MCNC: 9.6 G/DL — LOW (ref 13–17)
INR BLD: 1.19 — HIGH (ref 0.88–1.16)
INR BLD: 1.33 — HIGH (ref 0.88–1.16)
INR BLD: 1.33 — HIGH (ref 0.88–1.16)
LACTATE SERPL-SCNC: 1 MMOL/L — SIGNIFICANT CHANGE UP (ref 0.5–2)
LACTATE SERPL-SCNC: 1.8 MMOL/L — SIGNIFICANT CHANGE UP (ref 0.5–2)
MAGNESIUM SERPL-MCNC: 1.9 MG/DL — SIGNIFICANT CHANGE UP (ref 1.6–2.6)
MAGNESIUM SERPL-MCNC: 2.1 MG/DL — SIGNIFICANT CHANGE UP (ref 1.6–2.6)
MCHC RBC-ENTMCNC: 28.9 PG — SIGNIFICANT CHANGE UP (ref 27–34)
MCHC RBC-ENTMCNC: 29.4 PG — SIGNIFICANT CHANGE UP (ref 27–34)
MCHC RBC-ENTMCNC: 29.6 PG — SIGNIFICANT CHANGE UP (ref 27–34)
MCHC RBC-ENTMCNC: 32.9 G/DL — SIGNIFICANT CHANGE UP (ref 32–36)
MCHC RBC-ENTMCNC: 33.3 G/DL — SIGNIFICANT CHANGE UP (ref 32–36)
MCHC RBC-ENTMCNC: 34 G/DL — SIGNIFICANT CHANGE UP (ref 32–36)
MCV RBC AUTO: 86.8 FL — SIGNIFICANT CHANGE UP (ref 80–100)
MCV RBC AUTO: 87 FL — SIGNIFICANT CHANGE UP (ref 80–100)
MCV RBC AUTO: 89.3 FL — SIGNIFICANT CHANGE UP (ref 80–100)
PCO2 BLDV: 55 MMHG — HIGH (ref 41–51)
PH BLDV: 7.38 — SIGNIFICANT CHANGE UP (ref 7.32–7.43)
PHOSPHATE SERPL-MCNC: 2.8 MG/DL — SIGNIFICANT CHANGE UP (ref 2.5–4.5)
PHOSPHATE SERPL-MCNC: 4.3 MG/DL — SIGNIFICANT CHANGE UP (ref 2.5–4.5)
PLATELET # BLD AUTO: 145 K/UL — LOW (ref 150–400)
PLATELET # BLD AUTO: 146 K/UL — LOW (ref 150–400)
PLATELET # BLD AUTO: 168 K/UL — SIGNIFICANT CHANGE UP (ref 150–400)
PO2 BLDV: 30 MMHG — SIGNIFICANT CHANGE UP
POTASSIUM SERPL-MCNC: 4 MMOL/L — SIGNIFICANT CHANGE UP (ref 3.5–5.3)
POTASSIUM SERPL-MCNC: 4.2 MMOL/L — SIGNIFICANT CHANGE UP (ref 3.5–5.3)
POTASSIUM SERPL-MCNC: 4.4 MMOL/L — SIGNIFICANT CHANGE UP (ref 3.5–5.3)
POTASSIUM SERPL-SCNC: 4 MMOL/L — SIGNIFICANT CHANGE UP (ref 3.5–5.3)
POTASSIUM SERPL-SCNC: 4.2 MMOL/L — SIGNIFICANT CHANGE UP (ref 3.5–5.3)
POTASSIUM SERPL-SCNC: 4.4 MMOL/L — SIGNIFICANT CHANGE UP (ref 3.5–5.3)
PROT SERPL-MCNC: 5.4 G/DL — LOW (ref 6–8.3)
PROT SERPL-MCNC: 5.7 G/DL — LOW (ref 6–8.3)
PROT SERPL-MCNC: 5.8 G/DL — LOW (ref 6–8.3)
PROTHROM AB SERPL-ACNC: 13.3 SEC — HIGH (ref 9.8–12.7)
PROTHROM AB SERPL-ACNC: 14.8 SEC — HIGH (ref 9.8–12.7)
PROTHROM AB SERPL-ACNC: 14.9 SEC — HIGH (ref 9.8–12.7)
RBC # BLD: 2.72 M/UL — LOW (ref 4.2–5.8)
RBC # BLD: 2.87 M/UL — LOW (ref 4.2–5.8)
RBC # BLD: 3.24 M/UL — LOW (ref 4.2–5.8)
RBC # FLD: 16.5 % — SIGNIFICANT CHANGE UP (ref 10.3–16.9)
RBC # FLD: 17.3 % — HIGH (ref 10.3–16.9)
RBC # FLD: 17.7 % — HIGH (ref 10.3–16.9)
SAO2 % BLDV: 58 % — SIGNIFICANT CHANGE UP
SODIUM SERPL-SCNC: 139 MMOL/L — SIGNIFICANT CHANGE UP (ref 135–145)
SODIUM SERPL-SCNC: 140 MMOL/L — SIGNIFICANT CHANGE UP (ref 135–145)
SODIUM SERPL-SCNC: 140 MMOL/L — SIGNIFICANT CHANGE UP (ref 135–145)
WBC # BLD: 10.2 K/UL — SIGNIFICANT CHANGE UP (ref 3.8–10.5)
WBC # BLD: 11 K/UL — HIGH (ref 3.8–10.5)
WBC # BLD: 11.9 K/UL — HIGH (ref 3.8–10.5)
WBC # FLD AUTO: 10.2 K/UL — SIGNIFICANT CHANGE UP (ref 3.8–10.5)
WBC # FLD AUTO: 11 K/UL — HIGH (ref 3.8–10.5)
WBC # FLD AUTO: 11.9 K/UL — HIGH (ref 3.8–10.5)

## 2018-07-12 PROCEDURE — 71045 X-RAY EXAM CHEST 1 VIEW: CPT | Mod: 26

## 2018-07-12 PROCEDURE — 99291 CRITICAL CARE FIRST HOUR: CPT

## 2018-07-12 RX ORDER — MAGNESIUM SULFATE 500 MG/ML
2 VIAL (ML) INJECTION ONCE
Qty: 0 | Refills: 0 | Status: COMPLETED | OUTPATIENT
Start: 2018-07-12 | End: 2018-07-12

## 2018-07-12 RX ORDER — PANTOPRAZOLE SODIUM 20 MG/1
40 TABLET, DELAYED RELEASE ORAL DAILY
Qty: 0 | Refills: 0 | Status: DISCONTINUED | OUTPATIENT
Start: 2018-07-12 | End: 2018-07-18

## 2018-07-12 RX ORDER — FUROSEMIDE 40 MG
20 TABLET ORAL ONCE
Qty: 0 | Refills: 0 | Status: COMPLETED | OUTPATIENT
Start: 2018-07-12 | End: 2018-07-12

## 2018-07-12 RX ORDER — PANTOPRAZOLE SODIUM 20 MG/1
40 TABLET, DELAYED RELEASE ORAL
Qty: 0 | Refills: 0 | Status: DISCONTINUED | OUTPATIENT
Start: 2018-07-12 | End: 2018-07-12

## 2018-07-12 RX ORDER — ALBUMIN HUMAN 25 %
250 VIAL (ML) INTRAVENOUS ONCE
Qty: 0 | Refills: 0 | Status: COMPLETED | OUTPATIENT
Start: 2018-07-12 | End: 2018-07-13

## 2018-07-12 RX ORDER — ACETAMINOPHEN 500 MG
1000 TABLET ORAL ONCE
Qty: 0 | Refills: 0 | Status: COMPLETED | OUTPATIENT
Start: 2018-07-12 | End: 2018-07-12

## 2018-07-12 RX ORDER — ALBUMIN HUMAN 25 %
250 VIAL (ML) INTRAVENOUS
Qty: 0 | Refills: 0 | Status: COMPLETED | OUTPATIENT
Start: 2018-07-12 | End: 2018-07-12

## 2018-07-12 RX ORDER — CALCIUM GLUCONATE 100 MG/ML
2 VIAL (ML) INTRAVENOUS ONCE
Qty: 0 | Refills: 0 | Status: COMPLETED | OUTPATIENT
Start: 2018-07-12 | End: 2018-07-12

## 2018-07-12 RX ADMIN — Medication 12.5 MILLIGRAM(S): at 18:22

## 2018-07-12 RX ADMIN — PANTOPRAZOLE SODIUM 40 MILLIGRAM(S): 20 TABLET, DELAYED RELEASE ORAL at 05:36

## 2018-07-12 RX ADMIN — Medication 125 MILLILITER(S): at 00:26

## 2018-07-12 RX ADMIN — Medication 200 GRAM(S): at 04:14

## 2018-07-12 RX ADMIN — HEPARIN SODIUM 5000 UNIT(S): 5000 INJECTION INTRAVENOUS; SUBCUTANEOUS at 15:20

## 2018-07-12 RX ADMIN — Medication 1000 MILLIGRAM(S): at 13:30

## 2018-07-12 RX ADMIN — Medication 12.5 MILLIGRAM(S): at 05:36

## 2018-07-12 RX ADMIN — Medication 50 GRAM(S): at 13:32

## 2018-07-12 RX ADMIN — HEPARIN SODIUM 5000 UNIT(S): 5000 INJECTION INTRAVENOUS; SUBCUTANEOUS at 05:37

## 2018-07-12 RX ADMIN — Medication 20 MILLIGRAM(S): at 16:53

## 2018-07-12 RX ADMIN — Medication 125 MILLILITER(S): at 00:28

## 2018-07-12 RX ADMIN — Medication 250 MILLIGRAM(S): at 07:46

## 2018-07-12 RX ADMIN — ATORVASTATIN CALCIUM 80 MILLIGRAM(S): 80 TABLET, FILM COATED ORAL at 22:44

## 2018-07-12 RX ADMIN — Medication 400 MILLIGRAM(S): at 13:15

## 2018-07-12 RX ADMIN — Medication 81 MILLIGRAM(S): at 13:31

## 2018-07-12 RX ADMIN — HEPARIN SODIUM 5000 UNIT(S): 5000 INJECTION INTRAVENOUS; SUBCUTANEOUS at 22:44

## 2018-07-12 RX ADMIN — Medication 125 MILLILITER(S): at 05:37

## 2018-07-12 RX ADMIN — Medication 2: at 11:11

## 2018-07-12 RX ADMIN — PANTOPRAZOLE SODIUM 40 MILLIGRAM(S): 20 TABLET, DELAYED RELEASE ORAL at 13:36

## 2018-07-12 NOTE — SWALLOW BEDSIDE ASSESSMENT ADULT - COMMENTS
Summary of VFSS 10/2017: Swallow trigger was timely. Base of tongue retraction was moderately reduced. Epiglottic retroflexion was absent.  Hyolaryngeal excursion was severely reduced. There was transient penetration of thin liquid and trace aspiration of nectar-thick liquid with retrieval during incomplete swallows. There is mild velopharyngeal insufficiency resulting in a trace-mild amount of contrast being squeezed into the nasopharynx during the swallow. After swallow attempts, trace residue remains along the posterior pharyngeal wall indicative of decreased pharyngeal contraction, and the remainder of the bolus collects in the valleculae & pyriform sinuses. All bolus trials were ultimately regurgitated as there was no passage of contrast through the cervical esophagus despite multiple attempts to complete the pharyngeal swallow.

## 2018-07-12 NOTE — SWALLOW BEDSIDE ASSESSMENT ADULT - NS SPL SWALLOW CLINIC TRIAL FT
?completion of swallow trigger to palpation. Voice becomes very wet/gurgly after water trials. Pt was suctioned and blood-tinged water was retrieved from oropharynx. Voice quality returns to baseline after suctioning.

## 2018-07-12 NOTE — DIETITIAN INITIAL EVALUATION ADULT. - NS AS NUTRI INTERV ENTERAL NUTRITION
Route/Composition/Rate/Resume home regimen of Glucerna 1.5 via PEG, 6cans/day (1422ml TV, 2136kcal, 118g pro, 1080ml water).

## 2018-07-12 NOTE — DIETITIAN INITIAL EVALUATION ADULT. - ENERGY NEEDS
IBW 64.5Kg  %IBW 89%  BMI 20.4    Utilized ABW to calculate needs, pt falls within % of IBW. Adjusted for post-op/suspected malnutrition.

## 2018-07-12 NOTE — DIETITIAN INITIAL EVALUATION ADULT. - PROBLEM SELECTOR PLAN 1
- Plan is for OR tomorrow with Dr. Boyd.  - NPO at midnight.  - Restarted home meds - holding Amlodipine.  - GI PPX.  - DVT PPx.

## 2018-07-12 NOTE — SWALLOW BEDSIDE ASSESSMENT ADULT - SLP PERTINENT HISTORY OF CURRENT PROBLEM
Pt known to me from outpt VFSS in Oct 2017 (summary of findings below), s/p botox to the [facility] 5/2018 and POEM 2/2018, now s/p resxn of aberrant subclavian artery & carotid subclavian bypass.

## 2018-07-12 NOTE — PROGRESS NOTE ADULT - SUBJECTIVE AND OBJECTIVE BOX
CTICU  CRITICAL  CARE  attending     Hand off received 					   Pertinent clinical, laboratory, radiographic, hemodynamic, echocardiographic, respiratory data, microbiologic data and chart were reviewed and analyzed frequently throughout the course of the day and night  Patient seen and examined with CTS/ SH attending at bedside  Pt is a 62y , Male, post op day # 2 s/p CABG (coronary artery bypass graft)   x 3 (LIMA to LAD, SVG to PDA, SVG to Diag), Aorto to Right subclavian bypass, Stapling and divinding of Right subclavian artery. 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    today:    failed swallow eval  acute post hemorrhagic anemia; s/p 2 units PRBC  NPO  PEG feeds    Coronary artery disease: Coronary artery disease  HTN (hypertension): HTN (hypertension)  Dysphagia: Dysphagia      , FAMILY HISTORY:  Family history of breast cancer (Sibling)  Family history of malignant neoplasm of ovary (Mother)  Family history of pancreatic cancer (Father)  PAST MEDICAL & SURGICAL HISTORY:  Coronary artery disease  Dysphagia  HLD (hyperlipidemia)  Stroke  Diabetes  HTN (hypertension)  H/O hernia repair: At age 6 months.  S/P cataract surgery  S/P Botox injection: to [facility]    Patient is a 62y old  Male who presents with a chief complaint of dysphagia and aberrant right subclavian artery (2018 17:43)      14 system review was unremarkable  acute changes include acute respiratory failure  Vital signs, hemodynamic and respiratory parameters were reviewed from the bedside nursing flowsheet.  ICU Vital Signs Last 24 Hrs  T(C): 37 (2018 10:27), Max: 37.2 (2018 05:00)  T(F): 98.6 (2018 10:27), Max: 99 (2018 05:00)  HR: 80 (2018 17:00) (74 - 104)  BP: 132/81 (2018 15:00) (130/71 - 138/71)  BP(mean): 97 (2018 15:00) (97 - 101)  ABP: 146/68 (2018 17:00) (102/18 - 176/84)  ABP(mean): 94 (2018 17:00) (44 - 112)  RR: 20 (2018 17:00) (13 - 33)  SpO2: 95% (2018 17:00) (82% - 100%)    Adult Advanced Hemodynamics Last 24 Hrs  CVP(mm Hg): --  CVP(cm H2O): --  CO: --  CI: --  PA: --  PA(mean): --  PCWP: --  SVR: --  SVRI: --  PVR: --  PVRI: --, ABG - ( 2018 11:28 )  pH, Arterial: 7.43  pH, Blood: x     /  pCO2: 47    /  pO2: 75    / HCO3: 31    / Base Excess: 5.6   /  SaO2: 95                  Intake and output was reviewed and the fluid balance was calculated  Daily     Daily Weight in k.4 (2018 11:32)  I&O's Summary    2018 07:  -  2018 07:00  --------------------------------------------------------  IN: 2000 mL / OUT: 2260 mL / NET: -260 mL    2018 07:01  -  2018 17:36  --------------------------------------------------------  IN: 440 mL / OUT: 730 mL / NET: -290 mL        All lines and drain sites were assessed  Glycemic trend was reviewedCAPILLARY BLOOD GLUCOSE      POCT Blood Glucose.: 108 mg/dL (2018 16:26)    No acute change in mental status  Auscultation of the chest reveals equal bs  Abdomen is soft  Extremities are warm and well perfused  Wounds appear clean and unremarkable  Antibiotics are periop    labs  CBC Full  -  ( 2018 11:32 )  WBC Count : 10.2 K/uL  Hemoglobin : 8.3 g/dL  Hematocrit : 24.9 %  Platelet Count - Automated : 146 K/uL  Mean Cell Volume : 86.8 fL  Mean Cell Hemoglobin : 28.9 pg  Mean Cell Hemoglobin Concentration : 33.3 g/dL  Auto Neutrophil # : x  Auto Lymphocyte # : x  Auto Monocyte # : x  Auto Eosinophil # : x  Auto Basophil # : x  Auto Neutrophil % : x  Auto Lymphocyte % : x  Auto Monocyte % : x  Auto Eosinophil % : x  Auto Basophil % : x        139  |  98  |  26<H>  ----------------------------<  161<H>  4.2   |  30  |  0.64    Ca    8.9      2018 11:32  Phos  2.8       Mg     1.9         TPro  5.4<L>  /  Alb  3.8  /  TBili  1.0  /  DBili  x   /  AST  22  /  ALT  12  /  AlkPhos  36<L>      PT/INR - ( 2018 11:32 )   PT: 14.9 sec;   INR: 1.33          PTT - ( 2018 11:32 )  PTT:32.3 sec  The current medications were reviewed   MEDICATIONS  (STANDING):  aspirin enteric coated 81 milliGRAM(s) Oral daily  atorvastatin 80 milliGRAM(s) Oral at bedtime  dextrose 5%. 1000 milliLiter(s) (50 mL/Hr) IV Continuous <Continuous>  dextrose 50% Injectable 12.5 Gram(s) IV Push once  dextrose 50% Injectable 25 Gram(s) IV Push once  dextrose 50% Injectable 25 Gram(s) IV Push once  heparin  Injectable 5000 Unit(s) SubCutaneous every 8 hours  insulin lispro (HumaLOG) corrective regimen sliding scale   SubCutaneous Before meals and at bedtime  metoprolol tartrate 12.5 milliGRAM(s) Oral every 12 hours  pantoprazole  Injectable 40 milliGRAM(s) IV Push daily  sodium chloride 0.9%. 1000 milliLiter(s) (10 mL/Hr) IV Continuous <Continuous>    MEDICATIONS  (PRN):  dextrose 40% Gel 15 Gram(s) Oral once PRN Blood Glucose LESS THAN 70 milliGRAM(s)/deciliter  glucagon  Injectable 1 milliGRAM(s) IntraMuscular once PRN Glucose LESS THAN 70 milligrams/deciliter       PROBLEM LIST/ ASSESSMENT:  HEALTH ISSUES - PROBLEM Dx:  s/p CABG  s/p Aorto-Right Subclavian bypass  acute post hemorrhagic anemia  Coronary artery disease: Coronary artery disease  HTN (hypertension): HTN (hypertension)  Dysphagia:     Patient is a 62y old  Male who presents with a chief complaint of dysphagia and aberrant right subclavian artery (2018 17:43)     s/p CABG (coronary artery bypass graft)   x 3 (LIMA to LAD, SVG to PDA, SVG to Diag), Aorto to Right subclavian bypass, Stapling and divinding of Right subclavian artery. Esophagogastroduodenoscopy      My plan includes :  close hemodynamic, ventilatory and drain monitoring and management per post op routine    Monitor for arrhythmias and monitor parameters for organ perfusion  monitor neurologic status  Head of the bed should remain elevated to 45 deg .   chest PT and IS will be encouraged  monitor adequacy of oxygenation and ventilation and attempt to wean oxygen  Nutritional goals will be met using po eventually , ensure adequate caloric intake and montior the same  Stress ulcer and VTE prophylaxis will be achieved    Glycemic control is satisfactory  Electrolytes have been repleted as necessary and wound care has been carried out. Pain control has been achieved.   agressive physical therapy and early mobility and ambulation goals will be met   The family was updated about the course and plan  CRITICAL CARE TIME SPENT in evaluation and management, reassessments, review and interpretation of labs and x-rays, ventilator and hemodynamic management, formulating a plan and coordinating care: __45____ MIN.  Time does not include procedural time.  CTICU ATTENDING     					    Jason Marc MD

## 2018-07-12 NOTE — DIETITIAN INITIAL EVALUATION ADULT. - OTHER INFO
61y/o M s/p CABG x3, Aorto to Right subclavian bypass, Stapling and divining of Right subclavian artery, Esophagogastroduodenoscopy. Pt seen resting in chair. NPO ordered, but EN initiated via PEG; glucerna 1.2 infusing trophically at 10ml/hr. Pt with h/o dysphagia and prior PEG placement. Pt reports tolerating current EN well and denies N/V and pain. PTA pt normally self boluses glucerna 1.5, 6cans/day via PEG (1422ml TV, 2136kcal, 118g, 1080ml water). He endorses progressive wt loss over the past years. Pt states BW prior to loss was 260#, which indicates 133# wt loss. He also states that he went from a size 40 waist to a size 30. Suspect severe PCM 2/2 wt loss and NFPE. Per d/w PA, pt for bedside S&S today and barium esophagogram tomorrow. TF recs left to provide 100% of EER and entered for verification; PA notified. RD to follow per policy and adjust EN vs po if initiated.

## 2018-07-12 NOTE — DIETITIAN INITIAL EVALUATION ADULT. - PHYSICAL APPEARANCE
underweight/Per physical assessment: Muscle Wasting- Temporal [X]  Clavicle/Pectoral [X]  Shoulder/Deltoid [X- acromion process protruding]  Scapula [X]  Interosseous [   ]  Quadriceps [X]  Gastrocnemius [X]; Fat Wasting- Orbital [   ]  Buccal [X]  Triceps [X]  Rib [   ]--> Suspect  severe PCM 2/2 to physical assessment, wt loss, and possibly not receiving adequate nutrition via EN; please see malnutrition chart note.

## 2018-07-12 NOTE — SWALLOW BEDSIDE ASSESSMENT ADULT - SLP GENERAL OBSERVATIONS
Awake, alert, pleasant & cooperative. Sitting OOB to chair, speaking with wife. Language skills appear intact at the conversational level. Voice quality is clear at baseline.

## 2018-07-12 NOTE — CHART NOTE - NSCHARTNOTEFT_GEN_A_CORE
Upon Nutritional Assessment by the Registered Dietitian your patient was determined to meet criteria / has evidence of the following diagnosis/diagnoses:          [ ]  Mild Protein Calorie Malnutrition        [ ]  Moderate Protein Calorie Malnutrition        [X] Severe Protein Calorie Malnutrition        [ ] Unspecified Protein Calorie Malnutrition        [ ] Underweight / BMI <19        [ ] Morbid Obesity / BMI > 40      Findings as based on:  •  Comprehensive nutrition assessment and consultation  •  Calorie counts (nutrient intake analysis)  •  Food acceptance and intake status from observations by staff  •  Follow up  •  Patient education  •  Intervention secondary to interdisciplinary rounds  •   concerns      Treatment:    The following diet has been recommended:      PROVIDER Section:     By signing this assessment you are acknowledging and agree with the diagnosis/diagnoses assigned by the Registered Dietitian    Comments:

## 2018-07-13 LAB
ALBUMIN SERPL ELPH-MCNC: 3.8 G/DL — SIGNIFICANT CHANGE UP (ref 3.3–5)
ALBUMIN SERPL ELPH-MCNC: 3.9 G/DL — SIGNIFICANT CHANGE UP (ref 3.3–5)
ALP SERPL-CCNC: 36 U/L — LOW (ref 40–120)
ALP SERPL-CCNC: 42 U/L — SIGNIFICANT CHANGE UP (ref 40–120)
ALT FLD-CCNC: 11 U/L — SIGNIFICANT CHANGE UP (ref 10–45)
ALT FLD-CCNC: 13 U/L — SIGNIFICANT CHANGE UP (ref 10–45)
ANION GAP SERPL CALC-SCNC: 12 MMOL/L — SIGNIFICANT CHANGE UP (ref 5–17)
ANION GAP SERPL CALC-SCNC: 8 MMOL/L — SIGNIFICANT CHANGE UP (ref 5–17)
APTT BLD: 26.2 SEC — LOW (ref 27.5–37.4)
APTT BLD: 35 SEC — SIGNIFICANT CHANGE UP (ref 27.5–37.4)
AST SERPL-CCNC: 16 U/L — SIGNIFICANT CHANGE UP (ref 10–40)
AST SERPL-CCNC: 20 U/L — SIGNIFICANT CHANGE UP (ref 10–40)
BILIRUB SERPL-MCNC: 1 MG/DL — SIGNIFICANT CHANGE UP (ref 0.2–1.2)
BILIRUB SERPL-MCNC: 1 MG/DL — SIGNIFICANT CHANGE UP (ref 0.2–1.2)
BUN SERPL-MCNC: 26 MG/DL — HIGH (ref 7–23)
BUN SERPL-MCNC: 26 MG/DL — HIGH (ref 7–23)
CALCIUM SERPL-MCNC: 8.7 MG/DL — SIGNIFICANT CHANGE UP (ref 8.4–10.5)
CALCIUM SERPL-MCNC: 9 MG/DL — SIGNIFICANT CHANGE UP (ref 8.4–10.5)
CHLORIDE SERPL-SCNC: 94 MMOL/L — LOW (ref 96–108)
CHLORIDE SERPL-SCNC: 98 MMOL/L — SIGNIFICANT CHANGE UP (ref 96–108)
CO2 SERPL-SCNC: 30 MMOL/L — SIGNIFICANT CHANGE UP (ref 22–31)
CO2 SERPL-SCNC: 36 MMOL/L — HIGH (ref 22–31)
CREAT SERPL-MCNC: 0.6 MG/DL — SIGNIFICANT CHANGE UP (ref 0.5–1.3)
CREAT SERPL-MCNC: 0.6 MG/DL — SIGNIFICANT CHANGE UP (ref 0.5–1.3)
GAS PNL BLDA: SIGNIFICANT CHANGE UP
GLUCOSE BLDC GLUCOMTR-MCNC: 124 MG/DL — HIGH (ref 70–99)
GLUCOSE BLDC GLUCOMTR-MCNC: 135 MG/DL — HIGH (ref 70–99)
GLUCOSE BLDC GLUCOMTR-MCNC: 152 MG/DL — HIGH (ref 70–99)
GLUCOSE BLDC GLUCOMTR-MCNC: 164 MG/DL — HIGH (ref 70–99)
GLUCOSE BLDC GLUCOMTR-MCNC: 189 MG/DL — HIGH (ref 70–99)
GLUCOSE SERPL-MCNC: 139 MG/DL — HIGH (ref 70–99)
GLUCOSE SERPL-MCNC: 158 MG/DL — HIGH (ref 70–99)
HCT VFR BLD CALC: 27.7 % — LOW (ref 39–50)
HCT VFR BLD CALC: 27.9 % — LOW (ref 39–50)
HGB BLD-MCNC: 9.2 G/DL — LOW (ref 13–17)
HGB BLD-MCNC: 9.2 G/DL — LOW (ref 13–17)
INR BLD: 1.21 — HIGH (ref 0.88–1.16)
INR BLD: 1.23 — HIGH (ref 0.88–1.16)
LACTATE SERPL-SCNC: 1 MMOL/L — SIGNIFICANT CHANGE UP (ref 0.5–2)
MAGNESIUM SERPL-MCNC: 1.8 MG/DL — SIGNIFICANT CHANGE UP (ref 1.6–2.6)
MAGNESIUM SERPL-MCNC: 2.1 MG/DL — SIGNIFICANT CHANGE UP (ref 1.6–2.6)
MCHC RBC-ENTMCNC: 29.1 PG — SIGNIFICANT CHANGE UP (ref 27–34)
MCHC RBC-ENTMCNC: 29.4 PG — SIGNIFICANT CHANGE UP (ref 27–34)
MCHC RBC-ENTMCNC: 33 G/DL — SIGNIFICANT CHANGE UP (ref 32–36)
MCHC RBC-ENTMCNC: 33.2 G/DL — SIGNIFICANT CHANGE UP (ref 32–36)
MCV RBC AUTO: 87.7 FL — SIGNIFICANT CHANGE UP (ref 80–100)
MCV RBC AUTO: 89.1 FL — SIGNIFICANT CHANGE UP (ref 80–100)
PHOSPHATE SERPL-MCNC: 3.3 MG/DL — SIGNIFICANT CHANGE UP (ref 2.5–4.5)
PHOSPHATE SERPL-MCNC: 3.6 MG/DL — SIGNIFICANT CHANGE UP (ref 2.5–4.5)
PLATELET # BLD AUTO: 147 K/UL — LOW (ref 150–400)
PLATELET # BLD AUTO: 155 K/UL — SIGNIFICANT CHANGE UP (ref 150–400)
POTASSIUM SERPL-MCNC: 4 MMOL/L — SIGNIFICANT CHANGE UP (ref 3.5–5.3)
POTASSIUM SERPL-MCNC: 4 MMOL/L — SIGNIFICANT CHANGE UP (ref 3.5–5.3)
POTASSIUM SERPL-SCNC: 4 MMOL/L — SIGNIFICANT CHANGE UP (ref 3.5–5.3)
POTASSIUM SERPL-SCNC: 4 MMOL/L — SIGNIFICANT CHANGE UP (ref 3.5–5.3)
PROT SERPL-MCNC: 5.6 G/DL — LOW (ref 6–8.3)
PROT SERPL-MCNC: 5.8 G/DL — LOW (ref 6–8.3)
PROTHROM AB SERPL-ACNC: 13.5 SEC — HIGH (ref 9.8–12.7)
PROTHROM AB SERPL-ACNC: 13.7 SEC — HIGH (ref 9.8–12.7)
RBC # BLD: 3.13 M/UL — LOW (ref 4.2–5.8)
RBC # BLD: 3.16 M/UL — LOW (ref 4.2–5.8)
RBC # FLD: 16.1 % — SIGNIFICANT CHANGE UP (ref 10.3–16.9)
RBC # FLD: 16.5 % — SIGNIFICANT CHANGE UP (ref 10.3–16.9)
SODIUM SERPL-SCNC: 136 MMOL/L — SIGNIFICANT CHANGE UP (ref 135–145)
SODIUM SERPL-SCNC: 142 MMOL/L — SIGNIFICANT CHANGE UP (ref 135–145)
WBC # BLD: 10.2 K/UL — SIGNIFICANT CHANGE UP (ref 3.8–10.5)
WBC # BLD: 9 K/UL — SIGNIFICANT CHANGE UP (ref 3.8–10.5)
WBC # FLD AUTO: 10.2 K/UL — SIGNIFICANT CHANGE UP (ref 3.8–10.5)
WBC # FLD AUTO: 9 K/UL — SIGNIFICANT CHANGE UP (ref 3.8–10.5)

## 2018-07-13 PROCEDURE — 71045 X-RAY EXAM CHEST 1 VIEW: CPT | Mod: 26,76

## 2018-07-13 PROCEDURE — 99291 CRITICAL CARE FIRST HOUR: CPT

## 2018-07-13 RX ORDER — FUROSEMIDE 40 MG
20 TABLET ORAL ONCE
Qty: 0 | Refills: 0 | Status: COMPLETED | OUTPATIENT
Start: 2018-07-13 | End: 2018-07-13

## 2018-07-13 RX ORDER — POTASSIUM CHLORIDE 20 MEQ
20 PACKET (EA) ORAL ONCE
Qty: 0 | Refills: 0 | Status: COMPLETED | OUTPATIENT
Start: 2018-07-13 | End: 2018-07-13

## 2018-07-13 RX ORDER — CALCIUM GLUCONATE 100 MG/ML
2 VIAL (ML) INTRAVENOUS ONCE
Qty: 0 | Refills: 0 | Status: COMPLETED | OUTPATIENT
Start: 2018-07-13 | End: 2018-07-13

## 2018-07-13 RX ORDER — AMLODIPINE BESYLATE 2.5 MG/1
5 TABLET ORAL DAILY
Qty: 0 | Refills: 0 | Status: DISCONTINUED | OUTPATIENT
Start: 2018-07-13 | End: 2018-07-18

## 2018-07-13 RX ORDER — ALBUMIN HUMAN 25 %
250 VIAL (ML) INTRAVENOUS
Qty: 0 | Refills: 0 | Status: DISCONTINUED | OUTPATIENT
Start: 2018-07-13 | End: 2018-07-14

## 2018-07-13 RX ORDER — DEXMEDETOMIDINE HYDROCHLORIDE IN 0.9% SODIUM CHLORIDE 4 UG/ML
0.7 INJECTION INTRAVENOUS
Qty: 200 | Refills: 0 | Status: DISCONTINUED | OUTPATIENT
Start: 2018-07-13 | End: 2018-07-13

## 2018-07-13 RX ORDER — DEXMEDETOMIDINE HYDROCHLORIDE IN 0.9% SODIUM CHLORIDE 4 UG/ML
0.2 INJECTION INTRAVENOUS
Qty: 200 | Refills: 0 | Status: DISCONTINUED | OUTPATIENT
Start: 2018-07-13 | End: 2018-07-14

## 2018-07-13 RX ORDER — SERTRALINE 25 MG/1
50 TABLET, FILM COATED ORAL DAILY
Qty: 0 | Refills: 0 | Status: DISCONTINUED | OUTPATIENT
Start: 2018-07-13 | End: 2018-07-18

## 2018-07-13 RX ORDER — FENTANYL CITRATE 50 UG/ML
25 INJECTION INTRAVENOUS ONCE
Qty: 0 | Refills: 0 | Status: DISCONTINUED | OUTPATIENT
Start: 2018-07-13 | End: 2018-07-13

## 2018-07-13 RX ORDER — METOPROLOL TARTRATE 50 MG
5 TABLET ORAL ONCE
Qty: 0 | Refills: 0 | Status: COMPLETED | OUTPATIENT
Start: 2018-07-13 | End: 2018-07-13

## 2018-07-13 RX ORDER — CHLORHEXIDINE GLUCONATE 213 G/1000ML
1 SOLUTION TOPICAL DAILY
Qty: 0 | Refills: 0 | Status: DISCONTINUED | OUTPATIENT
Start: 2018-07-13 | End: 2018-07-18

## 2018-07-13 RX ORDER — METOPROLOL TARTRATE 50 MG
12.5 TABLET ORAL EVERY 6 HOURS
Qty: 0 | Refills: 0 | Status: DISCONTINUED | OUTPATIENT
Start: 2018-07-13 | End: 2018-07-18

## 2018-07-13 RX ORDER — ALPRAZOLAM 0.25 MG
0.25 TABLET ORAL ONCE
Qty: 0 | Refills: 0 | Status: DISCONTINUED | OUTPATIENT
Start: 2018-07-13 | End: 2018-07-13

## 2018-07-13 RX ADMIN — Medication 200 GRAM(S): at 05:34

## 2018-07-13 RX ADMIN — Medication 125 MILLILITER(S): at 00:09

## 2018-07-13 RX ADMIN — HEPARIN SODIUM 5000 UNIT(S): 5000 INJECTION INTRAVENOUS; SUBCUTANEOUS at 21:05

## 2018-07-13 RX ADMIN — Medication 12.5 MILLIGRAM(S): at 05:16

## 2018-07-13 RX ADMIN — Medication 12.5 MILLIGRAM(S): at 17:05

## 2018-07-13 RX ADMIN — Medication 81 MILLIGRAM(S): at 11:31

## 2018-07-13 RX ADMIN — FENTANYL CITRATE 25 MICROGRAM(S): 50 INJECTION INTRAVENOUS at 21:05

## 2018-07-13 RX ADMIN — SERTRALINE 50 MILLIGRAM(S): 25 TABLET, FILM COATED ORAL at 21:05

## 2018-07-13 RX ADMIN — Medication 20 MILLIGRAM(S): at 14:16

## 2018-07-13 RX ADMIN — FENTANYL CITRATE 25 MICROGRAM(S): 50 INJECTION INTRAVENOUS at 21:19

## 2018-07-13 RX ADMIN — Medication 20 MILLIGRAM(S): at 21:05

## 2018-07-13 RX ADMIN — DEXMEDETOMIDINE HYDROCHLORIDE IN 0.9% SODIUM CHLORIDE 10.04 MICROGRAM(S)/KG/HR: 4 INJECTION INTRAVENOUS at 01:53

## 2018-07-13 RX ADMIN — Medication 0.25 MILLIGRAM(S): at 16:20

## 2018-07-13 RX ADMIN — Medication 2: at 00:10

## 2018-07-13 RX ADMIN — Medication 2: at 17:04

## 2018-07-13 RX ADMIN — Medication 5 MILLIGRAM(S): at 14:16

## 2018-07-13 RX ADMIN — Medication 20 MILLIEQUIVALENT(S): at 14:16

## 2018-07-13 RX ADMIN — ATORVASTATIN CALCIUM 80 MILLIGRAM(S): 80 TABLET, FILM COATED ORAL at 21:05

## 2018-07-13 RX ADMIN — AMLODIPINE BESYLATE 5 MILLIGRAM(S): 2.5 TABLET ORAL at 18:42

## 2018-07-13 RX ADMIN — Medication 500 MILLILITER(S): at 11:31

## 2018-07-13 RX ADMIN — Medication 500 MILLILITER(S): at 19:43

## 2018-07-13 RX ADMIN — PANTOPRAZOLE SODIUM 40 MILLIGRAM(S): 20 TABLET, DELAYED RELEASE ORAL at 11:31

## 2018-07-13 RX ADMIN — HEPARIN SODIUM 5000 UNIT(S): 5000 INJECTION INTRAVENOUS; SUBCUTANEOUS at 05:15

## 2018-07-13 RX ADMIN — Medication 0.25 MILLIGRAM(S): at 18:42

## 2018-07-13 NOTE — CHART NOTE - NSCHARTNOTEFT_GEN_A_CORE
CT Removal:    Pt seen and examined at bedside.  Case discussed with Dr. Wasserman   Minimal output from CTs.  No air leak appreciated.  CT removed without incident per Dr. Wasserman's request.  Occlusive DSD placed.  CXR no obvious PTX noted.  Pt tolerated procedure well.     2 Juan drains present on evaluation, minimal drainage overnight.  One drain removed at bedside, DSD placed, pt tolerated the procedure well.

## 2018-07-13 NOTE — OCCUPATIONAL THERAPY INITIAL EVALUATION ADULT - GROSSLY INTACT, SENSORY
BUE/BLE sensation intact to light touch. Patient reports impaired hot/cold sensation RUE/RLE since CVA.

## 2018-07-13 NOTE — PROVIDER CONTACT NOTE (OTHER) - BACKGROUND
Pt s/p cabg. on RA at start of shit and throughout shift. Pleural R / L CT tubes dc'd by CAROL leach.  R CT site saturates post removal. SPO2 waveform poor at 18:30, new waveform at 19:15 reads only 83%.

## 2018-07-13 NOTE — OCCUPATIONAL THERAPY INITIAL EVALUATION ADULT - ADDITIONAL COMMENTS
Per patient he was independent with ADL PTA, using RW for community mobility, no AD for household ambulation. Owns shower chair.

## 2018-07-13 NOTE — OCCUPATIONAL THERAPY INITIAL EVALUATION ADULT - PERTINENT HX OF CURRENT PROBLEM, REHAB EVAL
62y , Male, s/p CABG (coronary artery bypass graft) x 3 (LIMA to LAD, SVG to PDA, SVG to Diag), Aorto to Right subclavian bypass, Stapling and divinding of Right subclavian artery. Esophagogastroduodenoscopy. Pt with dysphagia; found to have anomalous Right Subclavian artery.

## 2018-07-13 NOTE — PROGRESS NOTE ADULT - SUBJECTIVE AND OBJECTIVE BOX
CTICU  CRITICAL  CARE  attending     Hand off received @ 7a					   Pertinent clinical, laboratory, radiographic, hemodynamic, echocardiographic, respiratory data, microbiologic data and chart were reviewed and analyzed frequently throughout the course of the day and night  Patient seen and examined with CTS/ SH attending at bedside    Pt is a 62y , Male, post op day # 3 s/p CABG (coronary artery bypass graft)   x 3 (LIMA to LAD, SVG to PDA, SVG to Diag), Aorto to Right subclavian bypass, Stapling and divinding of Right subclavian artery. 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        failed swallow eval    today    acute post hemorrhagic anemia  NPO  PEG feeds    Coronary artery disease: Coronary artery disease  HTN (hypertension): HTN (hypertension)  Dysphagia: Dysphagia      , FAMILY HISTORY:  Family history of breast cancer (Sibling)  Family history of malignant neoplasm of ovary (Mother)  Family history of pancreatic cancer (Father)  PAST MEDICAL & SURGICAL HISTORY:  Coronary artery disease  Dysphagia  HLD (hyperlipidemia)  Stroke  Diabetes  HTN (hypertension)  H/O hernia repair: At age 6 months.  S/P cataract surgery  S/P Botox injection: to [facility]    Patient is a 62y old  Male who presents with a chief complaint of dysphagia and aberrant right subclavian artery (09 Jul 2018 17:43)      14 system review was unremarkable  acute changes include acute respiratory failure  Vital signs, hemodynamic and respiratory parameters were reviewed from the bedside nursing flowsheet.  ICU Vital Signs Last 24 Hrs  T(C): 36.6 (13 Jul 2018 15:00), Max: 36.7 (13 Jul 2018 01:00)  T(F): 97.9 (13 Jul 2018 15:00), Max: 98 (13 Jul 2018 01:00)  HR: 72 (13 Jul 2018 16:00) (58 - 98)  BP: 163/88 (13 Jul 2018 14:05) (147/80 - 163/88)  BP(mean): 121 (13 Jul 2018 14:05) (103 - 121)  ABP: 150/64 (13 Jul 2018 16:00) (120/56 - 198/88)  ABP(mean): 95 (13 Jul 2018 16:00) (76 - 126)  RR: 23 (13 Jul 2018 16:00) (11 - 31)  SpO2: 94% (13 Jul 2018 16:00) (93% - 99%)    Adult Advanced Hemodynamics Last 24 Hrs  CVP(mm Hg): --  CVP(cm H2O): --  CO: --  CI: --  PA: --  PA(mean): --  PCWP: --  SVR: --  SVRI: --  PVR: --  PVRI: --, ABG - ( 13 Jul 2018 03:55 )  pH, Arterial: 7.42  pH, Blood: x     /  pCO2: 48    /  pO2: 80    / HCO3: 31    / Base Excess: 5.6   /  SaO2: 95                  Intake and output was reviewed and the fluid balance was calculated  Daily     Daily   I&O's Summary    12 Jul 2018 07:01  -  13 Jul 2018 07:00  --------------------------------------------------------  IN: 1321.6 mL / OUT: 2190 mL / NET: -868.4 mL    13 Jul 2018 07:01  -  13 Jul 2018 16:55  --------------------------------------------------------  IN: 480 mL / OUT: 666 mL / NET: -186 mL        All lines and drain sites were assessed  Glycemic trend was reviewedCAPILLARY BLOOD GLUCOSE      POCT Blood Glucose.: 152 mg/dL (13 Jul 2018 12:13)    No acute change in mental status  Auscultation of the chest reveals equal bs  Abdomen is soft  Extremities are warm and well perfused  Wounds appear clean and unremarkable  Antibiotics are periop    labs  CBC Full  -  ( 13 Jul 2018 04:03 )  WBC Count : 10.2 K/uL  Hemoglobin : 9.2 g/dL  Hematocrit : 27.7 %  Platelet Count - Automated : 147 K/uL  Mean Cell Volume : 87.7 fL  Mean Cell Hemoglobin : 29.1 pg  Mean Cell Hemoglobin Concentration : 33.2 g/dL  Auto Neutrophil # : x  Auto Lymphocyte # : x  Auto Monocyte # : x  Auto Eosinophil # : x  Auto Basophil # : x  Auto Neutrophil % : x  Auto Lymphocyte % : x  Auto Monocyte % : x  Auto Eosinophil % : x  Auto Basophil % : x    07-13    136  |  94<L>  |  26<H>  ----------------------------<  158<H>  4.0   |  30  |  0.60    Ca    9.0      13 Jul 2018 04:03  Phos  3.3     07-13  Mg     2.1     07-13    TPro  5.8<L>  /  Alb  3.8  /  TBili  1.0  /  DBili  x   /  AST  20  /  ALT  13  /  AlkPhos  42  07-13    PT/INR - ( 13 Jul 2018 04:03 )   PT: 13.5 sec;   INR: 1.21          PTT - ( 13 Jul 2018 04:03 )  PTT:35.0 sec  The current medications were reviewed   MEDICATIONS  (STANDING):  albumin human  5% IVPB 250 milliLiter(s) IV Intermittent every 30 minutes  ALPRAZolam 0.25 milliGRAM(s) Oral once  aspirin enteric coated 81 milliGRAM(s) Oral daily  atorvastatin 80 milliGRAM(s) Oral at bedtime  chlorhexidine 2% Cloths 1 Application(s) Topical daily  dexmedetomidine Infusion 0.2 MICROgram(s)/kG/Hr (2.87 mL/Hr) IV Continuous <Continuous>  dextrose 5%. 1000 milliLiter(s) (50 mL/Hr) IV Continuous <Continuous>  dextrose 50% Injectable 12.5 Gram(s) IV Push once  dextrose 50% Injectable 25 Gram(s) IV Push once  dextrose 50% Injectable 25 Gram(s) IV Push once  heparin  Injectable 5000 Unit(s) SubCutaneous every 8 hours  insulin lispro (HumaLOG) corrective regimen sliding scale   SubCutaneous Before meals and at bedtime  metoprolol tartrate 12.5 milliGRAM(s) Oral every 6 hours  pantoprazole  Injectable 40 milliGRAM(s) IV Push daily  sodium chloride 0.9%. 1000 milliLiter(s) (10 mL/Hr) IV Continuous <Continuous>    MEDICATIONS  (PRN):  dextrose 40% Gel 15 Gram(s) Oral once PRN Blood Glucose LESS THAN 70 milliGRAM(s)/deciliter  glucagon  Injectable 1 milliGRAM(s) IntraMuscular once PRN Glucose LESS THAN 70 milligrams/deciliter       PROBLEM LIST/ ASSESSMENT:  HEALTH ISSUES - PROBLEM Dx:  s/p CABG  s/p Aorto-Right Subclavian bypass  acute post hemorrhagic anemia  malnutrition  Coronary artery disease: Coronary artery disease  HTN (hypertension): HTN (hypertension)  Dysphagia: Dysphagia      ,   Patient is a 62y old  Male who presents with a chief complaint of dysphagia and aberrant right subclavian artery (09 Jul 2018 17:43)     s/p  CABG (coronary artery bypass graft)   x 3 (LIMA to LAD, SVG to PDA, SVG to Diag), Aorto to Right subclavian bypass, Stapling and divinding of Right subclavian artery. Esophagogastroduodenoscopy      My plan includes :  close hemodynamic, ventilatory and drain monitoring and management per post op routine    Monitor for arrhythmias and monitor parameters for organ perfusion  monitor neurologic status  Head of the bed should remain elevated to 45 deg .   chest PT and IS will be encouraged  monitor adequacy of oxygenation and ventilation and attempt to wean oxygen  Nutritional goals will be met using po eventually , ensure adequate caloric intake and montior the same  Stress ulcer and VTE prophylaxis will be achieved    Glycemic control is satisfactory  Electrolytes have been repleted as necessary and wound care has been carried out. Pain control has been achieved.   agressive physical therapy and early mobility and ambulation goals will be met   The family was updated about the course and plan  CRITICAL CARE TIME SPENT in evaluation and management, reassessments, review and interpretation of labs and x-rays, ventilator and hemodynamic management, formulating a plan and coordinating care: ___45___ MIN.  Time does not include procedural time.  CTICU ATTENDING     					    Jason Marc MD

## 2018-07-13 NOTE — OCCUPATIONAL THERAPY INITIAL EVALUATION ADULT - GENERAL OBSERVATIONS, REHAB EVAL
Right hand dominant. Chart reviewed, patient cleared for OT eval by TOAN Sheikh. Received seated in recliner chair, NAD, +vazquez, +tele, +left radial a-line, +PEG, +LEROY, +CT, +TLC, denies pain.

## 2018-07-13 NOTE — PROVIDER CONTACT NOTE (OTHER) - ASSESSMENT
restless, already received xanax po per orders (baseline restlessness on this admit from prior stroke), diminished breath sounds

## 2018-07-13 NOTE — OCCUPATIONAL THERAPY INITIAL EVALUATION ADULT - PLANNED THERAPY INTERVENTIONS, OT EVAL
transfer training/ADL retraining/IADL retraining/balance training/bed mobility training/parent/caregiver training...

## 2018-07-13 NOTE — PROVIDER CONTACT NOTE (OTHER) - ACTION/TREATMENT ORDERED:
added oxygen  administered 250mL albumin per verbal order ade, then administer 20mg IV lasix by night rn

## 2018-07-14 LAB
ALBUMIN SERPL ELPH-MCNC: 3.7 G/DL — SIGNIFICANT CHANGE UP (ref 3.3–5)
ALP SERPL-CCNC: 41 U/L — SIGNIFICANT CHANGE UP (ref 40–120)
ALT FLD-CCNC: 12 U/L — SIGNIFICANT CHANGE UP (ref 10–45)
ANION GAP SERPL CALC-SCNC: 12 MMOL/L — SIGNIFICANT CHANGE UP (ref 5–17)
AST SERPL-CCNC: 16 U/L — SIGNIFICANT CHANGE UP (ref 10–40)
BILIRUB SERPL-MCNC: 1.1 MG/DL — SIGNIFICANT CHANGE UP (ref 0.2–1.2)
BUN SERPL-MCNC: 27 MG/DL — HIGH (ref 7–23)
CALCIUM SERPL-MCNC: 8.7 MG/DL — SIGNIFICANT CHANGE UP (ref 8.4–10.5)
CHLORIDE SERPL-SCNC: 98 MMOL/L — SIGNIFICANT CHANGE UP (ref 96–108)
CO2 SERPL-SCNC: 33 MMOL/L — HIGH (ref 22–31)
CREAT SERPL-MCNC: 0.6 MG/DL — SIGNIFICANT CHANGE UP (ref 0.5–1.3)
GLUCOSE BLDC GLUCOMTR-MCNC: 171 MG/DL — HIGH (ref 70–99)
GLUCOSE BLDC GLUCOMTR-MCNC: 172 MG/DL — HIGH (ref 70–99)
GLUCOSE BLDC GLUCOMTR-MCNC: 233 MG/DL — HIGH (ref 70–99)
GLUCOSE BLDC GLUCOMTR-MCNC: 267 MG/DL — HIGH (ref 70–99)
GLUCOSE BLDC GLUCOMTR-MCNC: 91 MG/DL — SIGNIFICANT CHANGE UP (ref 70–99)
GLUCOSE SERPL-MCNC: 213 MG/DL — HIGH (ref 70–99)
HCT VFR BLD CALC: 28.4 % — LOW (ref 39–50)
HGB BLD-MCNC: 9.4 G/DL — LOW (ref 13–17)
MAGNESIUM SERPL-MCNC: 1.8 MG/DL — SIGNIFICANT CHANGE UP (ref 1.6–2.6)
MCHC RBC-ENTMCNC: 29.7 PG — SIGNIFICANT CHANGE UP (ref 27–34)
MCHC RBC-ENTMCNC: 33.1 G/DL — SIGNIFICANT CHANGE UP (ref 32–36)
MCV RBC AUTO: 89.9 FL — SIGNIFICANT CHANGE UP (ref 80–100)
PHOSPHATE SERPL-MCNC: 3.5 MG/DL — SIGNIFICANT CHANGE UP (ref 2.5–4.5)
PLATELET # BLD AUTO: 241 K/UL — SIGNIFICANT CHANGE UP (ref 150–400)
POTASSIUM SERPL-MCNC: 4.1 MMOL/L — SIGNIFICANT CHANGE UP (ref 3.5–5.3)
POTASSIUM SERPL-SCNC: 4.1 MMOL/L — SIGNIFICANT CHANGE UP (ref 3.5–5.3)
PROT SERPL-MCNC: 5.6 G/DL — LOW (ref 6–8.3)
RBC # BLD: 3.16 M/UL — LOW (ref 4.2–5.8)
RBC # FLD: 15.8 % — SIGNIFICANT CHANGE UP (ref 10.3–16.9)
SODIUM SERPL-SCNC: 143 MMOL/L — SIGNIFICANT CHANGE UP (ref 135–145)
WBC # BLD: 10.2 K/UL — SIGNIFICANT CHANGE UP (ref 3.8–10.5)
WBC # FLD AUTO: 10.2 K/UL — SIGNIFICANT CHANGE UP (ref 3.8–10.5)

## 2018-07-14 PROCEDURE — 99291 CRITICAL CARE FIRST HOUR: CPT

## 2018-07-14 PROCEDURE — 71045 X-RAY EXAM CHEST 1 VIEW: CPT | Mod: 26

## 2018-07-14 RX ORDER — AMIODARONE HYDROCHLORIDE 400 MG/1
150 TABLET ORAL ONCE
Qty: 0 | Refills: 0 | Status: COMPLETED | OUTPATIENT
Start: 2018-07-14 | End: 2018-07-14

## 2018-07-14 RX ORDER — MAGNESIUM SULFATE 500 MG/ML
2 VIAL (ML) INJECTION ONCE
Qty: 0 | Refills: 0 | Status: COMPLETED | OUTPATIENT
Start: 2018-07-14 | End: 2018-07-14

## 2018-07-14 RX ORDER — AMIODARONE HYDROCHLORIDE 400 MG/1
200 TABLET ORAL DAILY
Qty: 0 | Refills: 0 | Status: DISCONTINUED | OUTPATIENT
Start: 2018-07-18 | End: 2018-07-18

## 2018-07-14 RX ORDER — FENTANYL CITRATE 50 UG/ML
25 INJECTION INTRAVENOUS ONCE
Qty: 0 | Refills: 0 | Status: DISCONTINUED | OUTPATIENT
Start: 2018-07-14 | End: 2018-07-14

## 2018-07-14 RX ORDER — AMIODARONE HYDROCHLORIDE 400 MG/1
400 TABLET ORAL EVERY 8 HOURS
Qty: 0 | Refills: 0 | Status: COMPLETED | OUTPATIENT
Start: 2018-07-14 | End: 2018-07-17

## 2018-07-14 RX ORDER — POLYETHYLENE GLYCOL 3350 17 G/17G
17 POWDER, FOR SOLUTION ORAL DAILY
Qty: 0 | Refills: 0 | Status: DISCONTINUED | OUTPATIENT
Start: 2018-07-14 | End: 2018-07-14

## 2018-07-14 RX ORDER — FUROSEMIDE 40 MG
40 TABLET ORAL DAILY
Qty: 0 | Refills: 0 | Status: DISCONTINUED | OUTPATIENT
Start: 2018-07-15 | End: 2018-07-18

## 2018-07-14 RX ADMIN — HEPARIN SODIUM 5000 UNIT(S): 5000 INJECTION INTRAVENOUS; SUBCUTANEOUS at 14:50

## 2018-07-14 RX ADMIN — Medication 2: at 11:49

## 2018-07-14 RX ADMIN — Medication 81 MILLIGRAM(S): at 11:59

## 2018-07-14 RX ADMIN — Medication 4: at 07:20

## 2018-07-14 RX ADMIN — CHLORHEXIDINE GLUCONATE 1 APPLICATION(S): 213 SOLUTION TOPICAL at 07:20

## 2018-07-14 RX ADMIN — PANTOPRAZOLE SODIUM 40 MILLIGRAM(S): 20 TABLET, DELAYED RELEASE ORAL at 11:59

## 2018-07-14 RX ADMIN — AMIODARONE HYDROCHLORIDE 600 MILLIGRAM(S): 400 TABLET ORAL at 06:50

## 2018-07-14 RX ADMIN — Medication 12.5 MILLIGRAM(S): at 12:00

## 2018-07-14 RX ADMIN — Medication 2: at 23:16

## 2018-07-14 RX ADMIN — Medication 12.5 MILLIGRAM(S): at 01:05

## 2018-07-14 RX ADMIN — FENTANYL CITRATE 25 MICROGRAM(S): 50 INJECTION INTRAVENOUS at 04:02

## 2018-07-14 RX ADMIN — SERTRALINE 50 MILLIGRAM(S): 25 TABLET, FILM COATED ORAL at 14:50

## 2018-07-14 RX ADMIN — HEPARIN SODIUM 5000 UNIT(S): 5000 INJECTION INTRAVENOUS; SUBCUTANEOUS at 07:20

## 2018-07-14 RX ADMIN — Medication 12.5 MILLIGRAM(S): at 19:34

## 2018-07-14 RX ADMIN — Medication 50 GRAM(S): at 07:16

## 2018-07-14 RX ADMIN — AMIODARONE HYDROCHLORIDE 400 MILLIGRAM(S): 400 TABLET ORAL at 22:59

## 2018-07-14 RX ADMIN — AMIODARONE HYDROCHLORIDE 400 MILLIGRAM(S): 400 TABLET ORAL at 12:00

## 2018-07-14 RX ADMIN — HEPARIN SODIUM 5000 UNIT(S): 5000 INJECTION INTRAVENOUS; SUBCUTANEOUS at 22:57

## 2018-07-14 RX ADMIN — ATORVASTATIN CALCIUM 80 MILLIGRAM(S): 80 TABLET, FILM COATED ORAL at 22:56

## 2018-07-14 NOTE — PROGRESS NOTE ADULT - ASSESSMENT
62y s/p CABG X3, aorto- right subclavian bypass for management of anomalous Right subclavian artery.      Problems  1. S/p above surgery   2. Dysphagia due to h/o achalasia, likely obstruction of anomalous right subclavian artery   3. post op afib    Plan   Neuro -- pain control  stable from prior stroke   CVS - Stable BB, on Norvasc, bb  Amio for Afib prevention   Pulm - stable resp status  GI - GI proph  H/o Dysphagia plan is for repeat swallow eval next week    - monitor UOP  Endo - glycemic control  Heme - DVT proph       Critical post op.    Critical care time spent 40 min

## 2018-07-14 NOTE — PROGRESS NOTE ADULT - SUBJECTIVE AND OBJECTIVE BOX
POD # 3 s/p CABG X3, Aorto to right subclavian bypass, EGD     63yo with multiple medical problems including CVA in May 2017, H/o Achalasia s/p multiple interventions including POEM, Botox injection.  Ongoing dysphagia since his stroke and s/p PEG insertion.  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   concern of stenosis/obstruction of esophagus may be related to abnormal anatomy.  Pt also diagnosed with 3 vessel CAD.  Now s/p CABG and Aorto to right subclavian bypass and division of anomalous Right subclavian artery.     PMH :  CAD  Coronary artery disease  Dysphagia  HLD (hyperlipidemia)  Stroke  Diabetes  HTN (hypertension)    Coronary artery disease involving native coronary artery of native heart, angina presence unspecified  Aberrant coronary artery  Dysphagia  CABG (coronary artery bypass graft)    ICU Vital Signs Last 24 Hrs  T(C): 36.8 (07-14-18 @ 16:53), Max: 36.8 (07-14-18 @ 16:53)  T(F): 98.3 (07-14-18 @ 16:53), Max: 98.3 (07-14-18 @ 16:53)  HR: 68 (07-14-18 @ 15:00) (66 - 110)  BP: 116/61 (07-14-18 @ 16:00) (104/59 - 184/90)  BP(mean): 86 (07-14-18 @ 16:00) (80 - 123)  ABP: 112/52 (07-13-18 @ 18:00) (112/52 - 112/52)  ABP(mean): 72 (07-13-18 @ 18:00) (72 - 72)  RR: 30 (07-14-18 @ 15:00) (15 - 36)  SpO2: 100% (07-14-18 @ 15:00) (91% - 100%)    I&O's Summary    13 Jul 2018 07:01  -  14 Jul 2018 07:00  --------------------------------------------------------  IN: 1290 mL / OUT: 2021 mL / NET: -731 mL    14 Jul 2018 07:01  -  14 Jul 2018 17:21  --------------------------------------------------------  IN: 469 mL / OUT: 320 mL / NET: 149 mL        ABG - ( 13 Jul 2018 03:55 )  pH: 7.42  /  pCO2: 48    /  pO2: 80    / HCO3: 31    / Base Excess: 5.6   /  SaO2: 95                              9.4    10.2  )-----------( 241      ( 14 Jul 2018 05:54 )             28.4     14 Jul 2018 05:08    143    |  98     |  27     ----------------------------<  213    4.1     |  33     |  0.60     Ca    8.7        14 Jul 2018 05:08  Phos  3.5       14 Jul 2018 05:08  Mg     1.8       14 Jul 2018 05:08    TPro  5.6    /  Alb  3.7    /  TBili  1.1    /  DBili  x      /  AST  16     /  ALT  12     /  AlkPhos  41     14 Jul 2018 05:08    PT/INR - ( 13 Jul 2018 22:20 )   PT: 13.7 sec;   INR: 1.23          PTT - ( 13 Jul 2018 22:20 )  PTT:26.2 sec  MEDICATIONS  (STANDING):  amiodarone    Tablet 400 milliGRAM(s) Oral every 8 hours  amLODIPine   Tablet 5 milliGRAM(s) Oral daily  aspirin enteric coated 81 milliGRAM(s) Oral daily  atorvastatin 80 milliGRAM(s) Oral at bedtime  chlorhexidine 2% Cloths 1 Application(s) Topical daily  dextrose 5%. 1000 milliLiter(s) IV Continuous <Continuous>  dextrose 50% Injectable 12.5 Gram(s) IV Push once  dextrose 50% Injectable 25 Gram(s) IV Push once  dextrose 50% Injectable 25 Gram(s) IV Push once  heparin  Injectable 5000 Unit(s) SubCutaneous every 8 hours  insulin lispro (HumaLOG) corrective regimen sliding scale   SubCutaneous Before meals and at bedtime  metoprolol tartrate 12.5 milliGRAM(s) Oral every 6 hours  pantoprazole  Injectable 40 milliGRAM(s) IV Push daily  sertraline 50 milliGRAM(s) Oral daily  sodium chloride 0.9%. 1000 milliLiter(s) IV Continuous <Continuous>    Home Medications:  amLODIPine 5 mg oral tablet: 1 tab(s) orally once a day (05 Jul 2018 10:38)  aspirin 81 mg oral tablet: 1 tab(s) orally once a day (05 Jul 2018 10:11)  repaglinide 1 mg oral tablet: 1 tab(s) orally 3 times a day (before meals) (05 Jul 2018 10:11)    PHYSICAL EXAM:  Gen : no acute distress  Neck: No LAD, No JVD  Respiratory: decreased in the bases  Cardiovascular: S1 and S2, RRR, no M/G/R  Gastrointestinal: BS+, soft, NT/ND  Extremities: No peripheral edema  Vascular: 2+ peripheral pulses  Neurological: A/O x 3, no focal deficits  Incision: clean dry/ no sign of infection  Lines: no sign of infection POD # 3 s/p CABG X3, Aorto to right subclavian bypass, EGD     63yo with multiple medical problems including CVA in May 2017, H/o Achalasia s/p multiple interventions including POEM, Botox injection.  Ongoing dysphagia since his stroke and s/p PEG insertion.  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   concern of stenosis/obstruction of esophagus may be related to abnormal anatomy.  Pt also diagnosed with 3 vessel CAD.  Now s/p CABG and Aorto to right subclavian bypass and division of anomalous Right subclavian artery.     24 hours : transient AFib  OOB to chair  ambulated  no acute issues.     PMH :  CAD  Coronary artery disease  Dysphagia  HLD (hyperlipidemia)  Stroke  Diabetes  HTN (hypertension)    Coronary artery disease involving native coronary artery of native heart, angina presence unspecified  Aberrant coronary artery  Dysphagia  CABG (coronary artery bypass graft)    ICU Vital Signs Last 24 Hrs  T(C): 36.8 (07-14-18 @ 16:53), Max: 36.8 (07-14-18 @ 16:53)  T(F): 98.3 (07-14-18 @ 16:53), Max: 98.3 (07-14-18 @ 16:53)  HR: 68 (07-14-18 @ 15:00) (66 - 110)  BP: 116/61 (07-14-18 @ 16:00) (104/59 - 184/90)  BP(mean): 86 (07-14-18 @ 16:00) (80 - 123)  ABP: 112/52 (07-13-18 @ 18:00) (112/52 - 112/52)  ABP(mean): 72 (07-13-18 @ 18:00) (72 - 72)  RR: 30 (07-14-18 @ 15:00) (15 - 36)  SpO2: 100% (07-14-18 @ 15:00) (91% - 100%)    I&O's Summary    13 Jul 2018 07:01  -  14 Jul 2018 07:00  --------------------------------------------------------  IN: 1290 mL / OUT: 2021 mL / NET: -731 mL    14 Jul 2018 07:01  -  14 Jul 2018 17:21  --------------------------------------------------------  IN: 469 mL / OUT: 320 mL / NET: 149 mL        ABG - ( 13 Jul 2018 03:55 )  pH: 7.42  /  pCO2: 48    /  pO2: 80    / HCO3: 31    / Base Excess: 5.6   /  SaO2: 95                              9.4    10.2  )-----------( 241      ( 14 Jul 2018 05:54 )             28.4     14 Jul 2018 05:08    143    |  98     |  27     ----------------------------<  213    4.1     |  33     |  0.60     Ca    8.7        14 Jul 2018 05:08  Phos  3.5       14 Jul 2018 05:08  Mg     1.8       14 Jul 2018 05:08    TPro  5.6    /  Alb  3.7    /  TBili  1.1    /  DBili  x      /  AST  16     /  ALT  12     /  AlkPhos  41     14 Jul 2018 05:08    PT/INR - ( 13 Jul 2018 22:20 )   PT: 13.7 sec;   INR: 1.23     PTT - ( 13 Jul 2018 22:20 )  PTT:26.2 sec    MEDICATIONS  (STANDING):  amiodarone    Tablet 400 milliGRAM(s) Oral every 8 hours  amLODIPine   Tablet 5 milliGRAM(s) Oral daily  aspirin enteric coated 81 milliGRAM(s) Oral daily  atorvastatin 80 milliGRAM(s) Oral at bedtime  heparin  Injectable 5000 Unit(s) SubCutaneous every 8 hours  insulin lispro (HumaLOG) corrective regimen sliding scale   SubCutaneous Before meals and at bedtime  metoprolol tartrate 12.5 milliGRAM(s) Oral every 6 hours  pantoprazole  Injectable 40 milliGRAM(s) IV Push daily  sertraline 50 milliGRAM(s) Oral daily      Home Medications:  amLODIPine 5 mg oral tablet: 1 tab(s) orally once a day (05 Jul 2018 10:38)  aspirin 81 mg oral tablet: 1 tab(s) orally once a day (05 Jul 2018 10:11)  repaglinide 1 mg oral tablet: 1 tab(s) orally 3 times a day (before meals) (05 Jul 2018 10:11)    PHYSICAL EXAM:  Gen : no acute distress  Respiratory: decreased in the bases  Cardiovascular: S1 and S2, RRR, no M/G/R  Gastrointestinal: BS+, soft, NT/ND  + PEG   Extremities: No peripheral edema  Vascular: 2+ peripheral pulses  Neurological: A/O x 3,but confused at times  + gait disturbance when ambulating  Incision: clean dry/ no sign of infection  Lines: no sign of infection POD # 4 s/p CABG X3, Aorto to right subclavian bypass, EGD     61yo with multiple medical problems including CVA in May 2017, H/o Achalasia s/p multiple interventions including POEM, Botox injection.  Ongoing dysphagia since his stroke and s/p PEG insertion.  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   concern of stenosis/obstruction of esophagus may be related to abnormal anatomy.  Pt also diagnosed with 3 vessel CAD.  Now s/p CABG and Aorto to right subclavian bypass and division of anomalous Right subclavian artery.     24 hours : transient AFib  OOB to chair  ambulated  no acute issues.     PMH :  CAD  Coronary artery disease  Dysphagia  HLD (hyperlipidemia)  Stroke  Diabetes  HTN (hypertension)    Coronary artery disease involving native coronary artery of native heart, angina presence unspecified  Aberrant coronary artery  Dysphagia  CABG (coronary artery bypass graft)    ICU Vital Signs Last 24 Hrs  T(C): 36.8 (07-14-18 @ 16:53), Max: 36.8 (07-14-18 @ 16:53)  T(F): 98.3 (07-14-18 @ 16:53), Max: 98.3 (07-14-18 @ 16:53)  HR: 68 (07-14-18 @ 15:00) (66 - 110)  BP: 116/61 (07-14-18 @ 16:00) (104/59 - 184/90)  BP(mean): 86 (07-14-18 @ 16:00) (80 - 123)  ABP: 112/52 (07-13-18 @ 18:00) (112/52 - 112/52)  ABP(mean): 72 (07-13-18 @ 18:00) (72 - 72)  RR: 30 (07-14-18 @ 15:00) (15 - 36)  SpO2: 100% (07-14-18 @ 15:00) (91% - 100%)    I&O's Summary    13 Jul 2018 07:01  -  14 Jul 2018 07:00  --------------------------------------------------------  IN: 1290 mL / OUT: 2021 mL / NET: -731 mL    14 Jul 2018 07:01  -  14 Jul 2018 17:21  --------------------------------------------------------  IN: 469 mL / OUT: 320 mL / NET: 149 mL        ABG - ( 13 Jul 2018 03:55 )  pH: 7.42  /  pCO2: 48    /  pO2: 80    / HCO3: 31    / Base Excess: 5.6   /  SaO2: 95                              9.4    10.2  )-----------( 241      ( 14 Jul 2018 05:54 )             28.4     14 Jul 2018 05:08    143    |  98     |  27     ----------------------------<  213    4.1     |  33     |  0.60     Ca    8.7        14 Jul 2018 05:08  Phos  3.5       14 Jul 2018 05:08  Mg     1.8       14 Jul 2018 05:08    TPro  5.6    /  Alb  3.7    /  TBili  1.1    /  DBili  x      /  AST  16     /  ALT  12     /  AlkPhos  41     14 Jul 2018 05:08    PT/INR - ( 13 Jul 2018 22:20 )   PT: 13.7 sec;   INR: 1.23     PTT - ( 13 Jul 2018 22:20 )  PTT:26.2 sec    MEDICATIONS  (STANDING):  amiodarone    Tablet 400 milliGRAM(s) Oral every 8 hours  amLODIPine   Tablet 5 milliGRAM(s) Oral daily  aspirin enteric coated 81 milliGRAM(s) Oral daily  atorvastatin 80 milliGRAM(s) Oral at bedtime  heparin  Injectable 5000 Unit(s) SubCutaneous every 8 hours  insulin lispro (HumaLOG) corrective regimen sliding scale   SubCutaneous Before meals and at bedtime  metoprolol tartrate 12.5 milliGRAM(s) Oral every 6 hours  pantoprazole  Injectable 40 milliGRAM(s) IV Push daily  sertraline 50 milliGRAM(s) Oral daily      Home Medications:  amLODIPine 5 mg oral tablet: 1 tab(s) orally once a day (05 Jul 2018 10:38)  aspirin 81 mg oral tablet: 1 tab(s) orally once a day (05 Jul 2018 10:11)  repaglinide 1 mg oral tablet: 1 tab(s) orally 3 times a day (before meals) (05 Jul 2018 10:11)    PHYSICAL EXAM:  Gen : no acute distress  Respiratory: decreased in the bases  Cardiovascular: S1 and S2, RRR, no M/G/R  Gastrointestinal: BS+, soft, NT/ND  + PEG   Extremities: No peripheral edema  Vascular: 2+ peripheral pulses  Neurological: A/O x 3,but confused at times  + gait disturbance when ambulating  Incision: clean dry/ no sign of infection  Lines: no sign of infection

## 2018-07-14 NOTE — CHART NOTE - NSCHARTNOTEFT_GEN_A_CORE
Admitting Diagnosis:   Patient is a 62y old  Male who presents with a chief complaint of dysphagia and aberrant right subclavian artery (09 Jul 2018 17:43)      PAST MEDICAL & SURGICAL HISTORY:  Coronary artery disease  Dysphagia  HLD (hyperlipidemia)  Stroke  Diabetes  HTN (hypertension)  H/O hernia repair: At age 6 months.  S/P cataract surgery  S/P Botox injection: to [facility]      Current Nutrition Order:  Glucerna 1.2 via PEG @ 40ml/hr x 24hr; infusing at ordered goal rate  NPO    GI Issues:   Denies N/V/D/C    Pain:  Controlled     Skin Integrity:  L malleolus unstageable PU  L EVH  R upper chest ASW  MSI    Labs:   07-14    143  |  98  |  27<H>  ----------------------------<  213<H>  4.1   |  33<H>  |  0.60    Ca    8.7      14 Jul 2018 05:08  Phos  3.5     07-14  Mg     1.8     07-14    TPro  5.6<L>  /  Alb  3.7  /  TBili  1.1  /  DBili  x   /  AST  16  /  ALT  12  /  AlkPhos  41  07-14    CAPILLARY BLOOD GLUCOSE      POCT Blood Glucose.: 233 mg/dL (14 Jul 2018 07:13)  POCT Blood Glucose.: 267 mg/dL (14 Jul 2018 07:11)  POCT Blood Glucose.: 124 mg/dL (13 Jul 2018 21:02)  POCT Blood Glucose.: 164 mg/dL (13 Jul 2018 16:57)  POCT Blood Glucose.: 152 mg/dL (13 Jul 2018 12:13)      Medications:  MEDICATIONS  (STANDING):  albumin human  5% IVPB 250 milliLiter(s) IV Intermittent every 30 minutes  amiodarone    Tablet 400 milliGRAM(s) Oral every 8 hours  amLODIPine   Tablet 5 milliGRAM(s) Oral daily  aspirin enteric coated 81 milliGRAM(s) Oral daily  atorvastatin 80 milliGRAM(s) Oral at bedtime  chlorhexidine 2% Cloths 1 Application(s) Topical daily  dextrose 5%. 1000 milliLiter(s) (50 mL/Hr) IV Continuous <Continuous>  dextrose 50% Injectable 12.5 Gram(s) IV Push once  dextrose 50% Injectable 25 Gram(s) IV Push once  dextrose 50% Injectable 25 Gram(s) IV Push once  heparin  Injectable 5000 Unit(s) SubCutaneous every 8 hours  insulin lispro (HumaLOG) corrective regimen sliding scale   SubCutaneous Before meals and at bedtime  metoprolol tartrate 12.5 milliGRAM(s) Oral every 6 hours  pantoprazole  Injectable 40 milliGRAM(s) IV Push daily  sertraline 50 milliGRAM(s) Oral daily  sodium chloride 0.9%. 1000 milliLiter(s) (10 mL/Hr) IV Continuous <Continuous>    MEDICATIONS  (PRN):  dextrose 40% Gel 15 Gram(s) Oral once PRN Blood Glucose LESS THAN 70 milliGRAM(s)/deciliter  glucagon  Injectable 1 milliGRAM(s) IntraMuscular once PRN Glucose LESS THAN 70 milligrams/deciliter      Weight:  No new wt    Weight Change:   Kindly take current to assess      Estimated energy needs:   ABW 57.4kg used for EER and adjusted for post-op/malnutrition  30-35kcal/kg (1722-2009kcal), 1.4-1.6g/kg (80-92g), 30-35ml/kg (1722-2009ml)    Subjective:   63y/o M s/p CABG x3, Aorto to Right subclavian bypass, Stapling and divining of Right subclavian artery, Esophagogastroduodenoscopy. SLP (7/12) recommended to continue NPO with PEG feeds pending VFSS/MBS. Pt seen resting in chair; he is slightly confused today per RN. EN infusing at ordered goal rate. Current TF regimen not meeting EER; d/w PA and new order re-entered for verification. Pt denies GI distress and pain. TF tolerated per RN. Will follow.     Previous Nutrition Diagnosis:  Inadequate enteral nutrition infusion RT infusion rate AEB pt meeting <50% of EER on trophic feeds    Active [X- as pt not meeting EER with current regimen]  Resolved [  ]    If resolved, new PES:     Goal:  Pt to meet 100% of EER via EN     Recommendations:  1. Adjust TF regimen to Glucerna 1.5 at 60ml/hr x 24hr (1440ml TV, 2160kcal, 118g pro, 1093ml free water) or bolus 6cans/day via PEG (pt's home regimen-->1422ml TV, 2136kcal, 118g pro, 1080ml water). Monitor for s/s of intolerance and maintain aspiration precautions. Fluids per team.   2. Monitor lytes and replete prn.   3. PO diet pending SLP f/u for VFSS/MBS.     *d/w PA and order entered for verification*    Education:   Pt slightly confused today per RN; differed until f/u    Risk Level: High [X] Moderate [   ] Low [   ]

## 2018-07-15 LAB
ALBUMIN SERPL ELPH-MCNC: 3.3 G/DL — SIGNIFICANT CHANGE UP (ref 3.3–5)
ALBUMIN SERPL ELPH-MCNC: 3.6 G/DL — SIGNIFICANT CHANGE UP (ref 3.3–5)
ALBUMIN SERPL ELPH-MCNC: 3.6 G/DL — SIGNIFICANT CHANGE UP (ref 3.3–5)
ALP SERPL-CCNC: 44 U/L — SIGNIFICANT CHANGE UP (ref 40–120)
ALP SERPL-CCNC: 46 U/L — SIGNIFICANT CHANGE UP (ref 40–120)
ALP SERPL-CCNC: 48 U/L — SIGNIFICANT CHANGE UP (ref 40–120)
ALT FLD-CCNC: 11 U/L — SIGNIFICANT CHANGE UP (ref 10–45)
ALT FLD-CCNC: 11 U/L — SIGNIFICANT CHANGE UP (ref 10–45)
ALT FLD-CCNC: 12 U/L — SIGNIFICANT CHANGE UP (ref 10–45)
ANION GAP SERPL CALC-SCNC: 11 MMOL/L — SIGNIFICANT CHANGE UP (ref 5–17)
ANION GAP SERPL CALC-SCNC: 6 MMOL/L — SIGNIFICANT CHANGE UP (ref 5–17)
ANION GAP SERPL CALC-SCNC: 8 MMOL/L — SIGNIFICANT CHANGE UP (ref 5–17)
APTT BLD: 31.6 SEC — SIGNIFICANT CHANGE UP (ref 27.5–37.4)
AST SERPL-CCNC: 14 U/L — SIGNIFICANT CHANGE UP (ref 10–40)
AST SERPL-CCNC: 16 U/L — SIGNIFICANT CHANGE UP (ref 10–40)
AST SERPL-CCNC: 17 U/L — SIGNIFICANT CHANGE UP (ref 10–40)
BILIRUB SERPL-MCNC: 1 MG/DL — SIGNIFICANT CHANGE UP (ref 0.2–1.2)
BILIRUB SERPL-MCNC: 1.1 MG/DL — SIGNIFICANT CHANGE UP (ref 0.2–1.2)
BILIRUB SERPL-MCNC: 1.1 MG/DL — SIGNIFICANT CHANGE UP (ref 0.2–1.2)
BUN SERPL-MCNC: 27 MG/DL — HIGH (ref 7–23)
BUN SERPL-MCNC: 27 MG/DL — HIGH (ref 7–23)
BUN SERPL-MCNC: 30 MG/DL — HIGH (ref 7–23)
CALCIUM SERPL-MCNC: 8.4 MG/DL — SIGNIFICANT CHANGE UP (ref 8.4–10.5)
CALCIUM SERPL-MCNC: 8.6 MG/DL — SIGNIFICANT CHANGE UP (ref 8.4–10.5)
CALCIUM SERPL-MCNC: 8.9 MG/DL — SIGNIFICANT CHANGE UP (ref 8.4–10.5)
CHLORIDE SERPL-SCNC: 97 MMOL/L — SIGNIFICANT CHANGE UP (ref 96–108)
CHLORIDE SERPL-SCNC: 99 MMOL/L — SIGNIFICANT CHANGE UP (ref 96–108)
CHLORIDE SERPL-SCNC: 99 MMOL/L — SIGNIFICANT CHANGE UP (ref 96–108)
CO2 SERPL-SCNC: 33 MMOL/L — HIGH (ref 22–31)
CO2 SERPL-SCNC: 38 MMOL/L — HIGH (ref 22–31)
CO2 SERPL-SCNC: 40 MMOL/L — HIGH (ref 22–31)
CREAT SERPL-MCNC: 0.59 MG/DL — SIGNIFICANT CHANGE UP (ref 0.5–1.3)
CREAT SERPL-MCNC: 0.62 MG/DL — SIGNIFICANT CHANGE UP (ref 0.5–1.3)
CREAT SERPL-MCNC: 0.69 MG/DL — SIGNIFICANT CHANGE UP (ref 0.5–1.3)
GLUCOSE BLDC GLUCOMTR-MCNC: 150 MG/DL — HIGH (ref 70–99)
GLUCOSE BLDC GLUCOMTR-MCNC: 168 MG/DL — HIGH (ref 70–99)
GLUCOSE BLDC GLUCOMTR-MCNC: 226 MG/DL — HIGH (ref 70–99)
GLUCOSE BLDC GLUCOMTR-MCNC: 229 MG/DL — HIGH (ref 70–99)
GLUCOSE BLDC GLUCOMTR-MCNC: 259 MG/DL — HIGH (ref 70–99)
GLUCOSE SERPL-MCNC: 164 MG/DL — HIGH (ref 70–99)
GLUCOSE SERPL-MCNC: 184 MG/DL — HIGH (ref 70–99)
GLUCOSE SERPL-MCNC: 219 MG/DL — HIGH (ref 70–99)
HCT VFR BLD CALC: 27 % — LOW (ref 39–50)
HCT VFR BLD CALC: 28.2 % — LOW (ref 39–50)
HCT VFR BLD CALC: 28.8 % — LOW (ref 39–50)
HGB BLD-MCNC: 8.7 G/DL — LOW (ref 13–17)
HGB BLD-MCNC: 8.8 G/DL — LOW (ref 13–17)
HGB BLD-MCNC: 8.9 G/DL — LOW (ref 13–17)
INR BLD: 1.19 — HIGH (ref 0.88–1.16)
MAGNESIUM SERPL-MCNC: 2 MG/DL — SIGNIFICANT CHANGE UP (ref 1.6–2.6)
MAGNESIUM SERPL-MCNC: 2 MG/DL — SIGNIFICANT CHANGE UP (ref 1.6–2.6)
MAGNESIUM SERPL-MCNC: 2.2 MG/DL — SIGNIFICANT CHANGE UP (ref 1.6–2.6)
MCHC RBC-ENTMCNC: 29 PG — SIGNIFICANT CHANGE UP (ref 27–34)
MCHC RBC-ENTMCNC: 29.4 PG — SIGNIFICANT CHANGE UP (ref 27–34)
MCHC RBC-ENTMCNC: 29.7 PG — SIGNIFICANT CHANGE UP (ref 27–34)
MCHC RBC-ENTMCNC: 30.6 G/DL — LOW (ref 32–36)
MCHC RBC-ENTMCNC: 31.6 G/DL — LOW (ref 32–36)
MCHC RBC-ENTMCNC: 32.2 G/DL — SIGNIFICANT CHANGE UP (ref 32–36)
MCV RBC AUTO: 92.2 FL — SIGNIFICANT CHANGE UP (ref 80–100)
MCV RBC AUTO: 93.1 FL — SIGNIFICANT CHANGE UP (ref 80–100)
MCV RBC AUTO: 95 FL — SIGNIFICANT CHANGE UP (ref 80–100)
PHOSPHATE SERPL-MCNC: 2.6 MG/DL — SIGNIFICANT CHANGE UP (ref 2.5–4.5)
PHOSPHATE SERPL-MCNC: 3.1 MG/DL — SIGNIFICANT CHANGE UP (ref 2.5–4.5)
PHOSPHATE SERPL-MCNC: 3.1 MG/DL — SIGNIFICANT CHANGE UP (ref 2.5–4.5)
PLATELET # BLD AUTO: 210 K/UL — SIGNIFICANT CHANGE UP (ref 150–400)
PLATELET # BLD AUTO: 232 K/UL — SIGNIFICANT CHANGE UP (ref 150–400)
PLATELET # BLD AUTO: 255 K/UL — SIGNIFICANT CHANGE UP (ref 150–400)
POTASSIUM SERPL-MCNC: 4.5 MMOL/L — SIGNIFICANT CHANGE UP (ref 3.5–5.3)
POTASSIUM SERPL-MCNC: 4.7 MMOL/L — SIGNIFICANT CHANGE UP (ref 3.5–5.3)
POTASSIUM SERPL-MCNC: 4.8 MMOL/L — SIGNIFICANT CHANGE UP (ref 3.5–5.3)
POTASSIUM SERPL-SCNC: 4.5 MMOL/L — SIGNIFICANT CHANGE UP (ref 3.5–5.3)
POTASSIUM SERPL-SCNC: 4.7 MMOL/L — SIGNIFICANT CHANGE UP (ref 3.5–5.3)
POTASSIUM SERPL-SCNC: 4.8 MMOL/L — SIGNIFICANT CHANGE UP (ref 3.5–5.3)
PROT SERPL-MCNC: 5.6 G/DL — LOW (ref 6–8.3)
PROT SERPL-MCNC: 5.7 G/DL — LOW (ref 6–8.3)
PROT SERPL-MCNC: 5.7 G/DL — LOW (ref 6–8.3)
PROTHROM AB SERPL-ACNC: 13.2 SEC — HIGH (ref 9.8–12.7)
RBC # BLD: 2.93 M/UL — LOW (ref 4.2–5.8)
RBC # BLD: 3.03 M/UL — LOW (ref 4.2–5.8)
RBC # BLD: 3.03 M/UL — LOW (ref 4.2–5.8)
RBC # FLD: 15.5 % — SIGNIFICANT CHANGE UP (ref 10.3–16.9)
RBC # FLD: 15.7 % — SIGNIFICANT CHANGE UP (ref 10.3–16.9)
RBC # FLD: 15.9 % — SIGNIFICANT CHANGE UP (ref 10.3–16.9)
SODIUM SERPL-SCNC: 141 MMOL/L — SIGNIFICANT CHANGE UP (ref 135–145)
SODIUM SERPL-SCNC: 143 MMOL/L — SIGNIFICANT CHANGE UP (ref 135–145)
SODIUM SERPL-SCNC: 147 MMOL/L — HIGH (ref 135–145)
WBC # BLD: 10.7 K/UL — HIGH (ref 3.8–10.5)
WBC # BLD: 8.4 K/UL — SIGNIFICANT CHANGE UP (ref 3.8–10.5)
WBC # BLD: 8.5 K/UL — SIGNIFICANT CHANGE UP (ref 3.8–10.5)
WBC # FLD AUTO: 10.7 K/UL — HIGH (ref 3.8–10.5)
WBC # FLD AUTO: 8.4 K/UL — SIGNIFICANT CHANGE UP (ref 3.8–10.5)
WBC # FLD AUTO: 8.5 K/UL — SIGNIFICANT CHANGE UP (ref 3.8–10.5)

## 2018-07-15 PROCEDURE — 99291 CRITICAL CARE FIRST HOUR: CPT

## 2018-07-15 PROCEDURE — 71045 X-RAY EXAM CHEST 1 VIEW: CPT | Mod: 26

## 2018-07-15 RX ADMIN — Medication 4: at 22:35

## 2018-07-15 RX ADMIN — HEPARIN SODIUM 5000 UNIT(S): 5000 INJECTION INTRAVENOUS; SUBCUTANEOUS at 14:50

## 2018-07-15 RX ADMIN — AMIODARONE HYDROCHLORIDE 400 MILLIGRAM(S): 400 TABLET ORAL at 06:04

## 2018-07-15 RX ADMIN — Medication 40 MILLIGRAM(S): at 06:04

## 2018-07-15 RX ADMIN — Medication 4: at 17:00

## 2018-07-15 RX ADMIN — SERTRALINE 50 MILLIGRAM(S): 25 TABLET, FILM COATED ORAL at 12:00

## 2018-07-15 RX ADMIN — Medication 12.5 MILLIGRAM(S): at 19:45

## 2018-07-15 RX ADMIN — Medication 12.5 MILLIGRAM(S): at 11:59

## 2018-07-15 RX ADMIN — PANTOPRAZOLE SODIUM 40 MILLIGRAM(S): 20 TABLET, DELAYED RELEASE ORAL at 11:59

## 2018-07-15 RX ADMIN — CHLORHEXIDINE GLUCONATE 1 APPLICATION(S): 213 SOLUTION TOPICAL at 11:59

## 2018-07-15 RX ADMIN — Medication 2: at 07:28

## 2018-07-15 RX ADMIN — HEPARIN SODIUM 5000 UNIT(S): 5000 INJECTION INTRAVENOUS; SUBCUTANEOUS at 22:29

## 2018-07-15 RX ADMIN — Medication 81 MILLIGRAM(S): at 11:59

## 2018-07-15 RX ADMIN — Medication 12.5 MILLIGRAM(S): at 00:24

## 2018-07-15 RX ADMIN — Medication 12.5 MILLIGRAM(S): at 06:05

## 2018-07-15 RX ADMIN — AMIODARONE HYDROCHLORIDE 400 MILLIGRAM(S): 400 TABLET ORAL at 22:30

## 2018-07-15 RX ADMIN — AMLODIPINE BESYLATE 5 MILLIGRAM(S): 2.5 TABLET ORAL at 06:04

## 2018-07-15 RX ADMIN — ATORVASTATIN CALCIUM 80 MILLIGRAM(S): 80 TABLET, FILM COATED ORAL at 22:29

## 2018-07-15 RX ADMIN — HEPARIN SODIUM 5000 UNIT(S): 5000 INJECTION INTRAVENOUS; SUBCUTANEOUS at 06:05

## 2018-07-15 RX ADMIN — AMIODARONE HYDROCHLORIDE 400 MILLIGRAM(S): 400 TABLET ORAL at 14:49

## 2018-07-15 NOTE — PROGRESS NOTE ADULT - SUBJECTIVE AND OBJECTIVE BOX
POD # 4 s/p CABG X3, Aorto to right subclavian bypass, EGD     61yo with multiple medical problems including CVA in May 2017, H/o Achalasia s/p multiple interventions including POEM, Botox injection.  Ongoing dysphagia since his stroke and s/p PEG insertion.  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   concern of stenosis/obstruction of esophagus may be related to abnormal anatomy.  Pt also diagnosed with 3 vessel CAD.  Now s/p CABG and Aorto to right subclavian bypass and division of anomalous Right subclavian artery.     24 hours : no acute issues, OOB to chair, ambulating    PMH :  Coronary artery disease  Dysphagia  HLD (hyperlipidemia)  Stroke  Diabetes  HTN (hypertension)  Dysphagia, unspecified type  HTN (hypertension)  Dysphagia  CABG (coronary artery bypass graft)  H/O hernia repair  S/P cataract surgery  S/P Botox injection  No significant past surgical history        ICU Vital Signs Last 24 Hrs  T(C): 37 (07-15-18 @ 14:06), Max: 37 (07-15-18 @ 01:00)  T(F): 98.6 (07-15-18 @ 14:06), Max: 98.6 (07-15-18 @ 01:00)  HR: 60 (07-15-18 @ 12:00) (60 - 84)  BP: 131/66 (07-15-18 @ 12:00) (111/54 - 160/84)  BP(mean): 86 (07-15-18 @ 12:00) (78 - 109)  RR: 17 (07-15-18 @ 12:00) (16 - 30)  SpO2: 100% (07-15-18 @ 12:00) (99% - 100%)    I&O's Summary    14 Jul 2018 07:01  -  15 Jul 2018 07:00  --------------------------------------------------------  IN: 1079 mL / OUT: 375 mL / NET: 704 mL    15 Jul 2018 07:01  -  15 Jul 2018 14:12  --------------------------------------------------------  IN: 240 mL / OUT: 301 mL / NET: -61 mL                          8.9    8.5   )-----------( 232      ( 15 Jul 2018 05:18 )             28.2     15 Jul 2018 05:18    143    |  99     |  27     ----------------------------<  164    4.8     |  38     |  0.59     Ca    8.6        15 Jul 2018 05:18  Phos  3.1       15 Jul 2018 05:18  Mg     2.2       15 Jul 2018 05:18    TPro  5.6    /  Alb  3.6    /  TBili  1.1    /  DBili  x      /  AST  17     /  ALT  11     /  AlkPhos  48     15 Jul 2018 05:18    PT/INR - ( 15 Jul 2018 05:18 )   PT: 13.2 sec;   INR: 1.19     PTT - ( 15 Jul 2018 05:18 )  PTT:31.6 sec    MEDICATIONS  (STANDING):  amiodarone    Tablet 400 milliGRAM(s) Oral every 8 hours  amLODIPine   Tablet 5 milliGRAM(s) Oral daily  aspirin enteric coated 81 milliGRAM(s) Oral daily  atorvastatin 80 milliGRAM(s) Oral at bedtime  furosemide    Tablet 40 milliGRAM(s) Oral daily  heparin  Injectable 5000 Unit(s) SubCutaneous every 8 hours  insulin lispro (HumaLOG) corrective regimen sliding scale   SubCutaneous Before meals and at bedtime  metoprolol tartrate 12.5 milliGRAM(s) Oral every 6 hours  pantoprazole  Injectable 40 milliGRAM(s) IV Push daily  sertraline 50 milliGRAM(s) Oral daily      Home Medications:  amLODIPine 5 mg oral tablet: 1 tab(s) orally once a day (05 Jul 2018 10:38)  aspirin 81 mg oral tablet: 1 tab(s) orally once a day (05 Jul 2018 10:11)  repaglinide 1 mg oral tablet: 1 tab(s) orally 3 times a day (before meals) (05 Jul 2018 10:11)    PHYSICAL EXAM:  Gen : no acute distress  Respiratory: decreased in the bases  Cardiovascular: S1 and S2, RRR, no M/G/R  Gastrointestinal: BS+, soft, NT/ND  Extremities: No peripheral edema  Vascular: 2+ peripheral pulses POD #  5 s/p CABG X3, Aorto to right subclavian bypass, EGD     63yo with multiple medical problems including CVA in May 2017, H/o Achalasia s/p multiple interventions including POEM, Botox injection.  Ongoing dysphagia since his stroke and s/p PEG insertion.  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   concern of stenosis/obstruction of esophagus may be related to abnormal anatomy.  Pt also diagnosed with 3 vessel CAD.  Now s/p CABG and Aorto to right subclavian bypass and division of anomalous Right subclavian artery.     24 hours : no acute issues, OOB to chair, ambulating    PMH :  Coronary artery disease  Dysphagia  HLD (hyperlipidemia)  Stroke  Diabetes  HTN (hypertension)      CABG (coronary artery bypass graft)  H/O hernia repair  S/P cataract surgery  S/P Botox injection    ICU Vital Signs Last 24 Hrs  T(C): 37 (07-15-18 @ 14:06), Max: 37 (07-15-18 @ 01:00)  T(F): 98.6 (07-15-18 @ 14:06), Max: 98.6 (07-15-18 @ 01:00)  HR: 60 (07-15-18 @ 12:00) (60 - 84)  BP: 131/66 (07-15-18 @ 12:00) (111/54 - 160/84)  BP(mean): 86 (07-15-18 @ 12:00) (78 - 109)  RR: 17 (07-15-18 @ 12:00) (16 - 30)  SpO2: 100% (07-15-18 @ 12:00) (99% - 100%)    I&O's Summary    14 Jul 2018 07:01  -  15 Jul 2018 07:00  --------------------------------------------------------  IN: 1079 mL / OUT: 375 mL / NET: 704 mL    15 Jul 2018 07:01  -  15 Jul 2018 14:12  --------------------------------------------------------  IN: 240 mL / OUT: 301 mL / NET: -61 mL                          8.9    8.5   )-----------( 232      ( 15 Jul 2018 05:18 )             28.2     15 Jul 2018 05:18    143    |  99     |  27     ----------------------------<  164    4.8     |  38     |  0.59     Ca    8.6        15 Jul 2018 05:18  Phos  3.1       15 Jul 2018 05:18  Mg     2.2       15 Jul 2018 05:18    TPro  5.6    /  Alb  3.6    /  TBili  1.1    /  DBili  x      /  AST  17     /  ALT  11     /  AlkPhos  48     15 Jul 2018 05:18    PT/INR - ( 15 Jul 2018 05:18 )   PT: 13.2 sec;   INR: 1.19     PTT - ( 15 Jul 2018 05:18 )  PTT:31.6 sec    MEDICATIONS  (STANDING):  amiodarone    Tablet 400 milliGRAM(s) Oral every 8 hours  amLODIPine   Tablet 5 milliGRAM(s) Oral daily  aspirin enteric coated 81 milliGRAM(s) Oral daily  atorvastatin 80 milliGRAM(s) Oral at bedtime  furosemide    Tablet 40 milliGRAM(s) Oral daily  heparin  Injectable 5000 Unit(s) SubCutaneous every 8 hours  insulin lispro (HumaLOG) corrective regimen sliding scale   SubCutaneous Before meals and at bedtime  metoprolol tartrate 12.5 milliGRAM(s) Oral every 6 hours  pantoprazole  Injectable 40 milliGRAM(s) IV Push daily  sertraline 50 milliGRAM(s) Oral daily      Home Medications:  amLODIPine 5 mg oral tablet: 1 tab(s) orally once a day (05 Jul 2018 10:38)  aspirin 81 mg oral tablet: 1 tab(s) orally once a day (05 Jul 2018 10:11)  repaglinide 1 mg oral tablet: 1 tab(s) orally 3 times a day (before meals) (05 Jul 2018 10:11)    PHYSICAL EXAM:  Gen : no acute distress  Respiratory: decreased in the bases  Cardiovascular: S1 and S2, RRR, no M/G/R  Gastrointestinal: BS+, soft, NT/ND  Extremities: No peripheral edema  Vascular: 2+ peripheral pulses

## 2018-07-15 NOTE — PROGRESS NOTE ADULT - ASSESSMENT
62y s/p CABG X3, aorto- right subclavian bypass for management of anomalous Right subclavian artery.      Problems  1. S/p above surgery   2. Dysphagia due to h/o achalasia, likely esophageal obstruction/narrowing of anomalous right subclavian artery   3. Post op afib    Plan   Neuro -- ambulating with assistance.  Residual gait disturbance from h/o prior stroke  CVS - Stable BB, on Norvasc, ASA  Amio for Afib prevention   Lasix  Pulm - stable resp status  GI - GI proph  H/o Dysphagia plan is for repeat swallow eval next week    - monitor UOP  Endo - glycemic control  Heme - DVT proph       Critical post op.    Critical care time spent 40 min 62y s/p CABG X3, aorto- right subclavian bypass for management of anomalous Right subclavian artery on 7/10    Problems  1. S/p above surgery   2. Dysphagia due to h/o achalasia, likely esophageal obstruction/narrowing of anomalous right subclavian artery   3. Post op afib    Plan   Neuro -- ambulating with assistance.  Residual gait disturbance from h/o prior stroke  CVS - Stable BB, on Norvasc, ASA  Amio for Afib prevention   chest tube management  Pulm - stable resp status  GI - GI proph, continuing PEG feeds  H/o Dysphagia plan is for repeat swallow eval +/- esophagram tmr   - monitor UOP, lasix resmued   Endo - glycemic control  Heme - DVT proph       Critical post op.    Critical care time spent 30 min

## 2018-07-16 LAB
ALBUMIN SERPL ELPH-MCNC: 3.5 G/DL — SIGNIFICANT CHANGE UP (ref 3.3–5)
ALBUMIN SERPL ELPH-MCNC: 3.5 G/DL — SIGNIFICANT CHANGE UP (ref 3.3–5)
ALP SERPL-CCNC: 49 U/L — SIGNIFICANT CHANGE UP (ref 40–120)
ALP SERPL-CCNC: 50 U/L — SIGNIFICANT CHANGE UP (ref 40–120)
ALT FLD-CCNC: 12 U/L — SIGNIFICANT CHANGE UP (ref 10–45)
ALT FLD-CCNC: 12 U/L — SIGNIFICANT CHANGE UP (ref 10–45)
ANION GAP SERPL CALC-SCNC: 10 MMOL/L — SIGNIFICANT CHANGE UP (ref 5–17)
ANION GAP SERPL CALC-SCNC: 7 MMOL/L — SIGNIFICANT CHANGE UP (ref 5–17)
APTT BLD: 29.4 SEC — SIGNIFICANT CHANGE UP (ref 27.5–37.4)
APTT BLD: 36.8 SEC — SIGNIFICANT CHANGE UP (ref 27.5–37.4)
AST SERPL-CCNC: 15 U/L — SIGNIFICANT CHANGE UP (ref 10–40)
AST SERPL-CCNC: 15 U/L — SIGNIFICANT CHANGE UP (ref 10–40)
BILIRUB SERPL-MCNC: 1.1 MG/DL — SIGNIFICANT CHANGE UP (ref 0.2–1.2)
BILIRUB SERPL-MCNC: 1.2 MG/DL — SIGNIFICANT CHANGE UP (ref 0.2–1.2)
BUN SERPL-MCNC: 28 MG/DL — HIGH (ref 7–23)
BUN SERPL-MCNC: 31 MG/DL — HIGH (ref 7–23)
CALCIUM SERPL-MCNC: 8.4 MG/DL — SIGNIFICANT CHANGE UP (ref 8.4–10.5)
CALCIUM SERPL-MCNC: 8.6 MG/DL — SIGNIFICANT CHANGE UP (ref 8.4–10.5)
CHLORIDE SERPL-SCNC: 96 MMOL/L — SIGNIFICANT CHANGE UP (ref 96–108)
CHLORIDE SERPL-SCNC: 99 MMOL/L — SIGNIFICANT CHANGE UP (ref 96–108)
CO2 SERPL-SCNC: 39 MMOL/L — HIGH (ref 22–31)
CO2 SERPL-SCNC: 41 MMOL/L — HIGH (ref 22–31)
CREAT SERPL-MCNC: 0.69 MG/DL — SIGNIFICANT CHANGE UP (ref 0.5–1.3)
CREAT SERPL-MCNC: 0.75 MG/DL — SIGNIFICANT CHANGE UP (ref 0.5–1.3)
GLUCOSE BLDC GLUCOMTR-MCNC: 140 MG/DL — HIGH (ref 70–99)
GLUCOSE BLDC GLUCOMTR-MCNC: 162 MG/DL — HIGH (ref 70–99)
GLUCOSE BLDC GLUCOMTR-MCNC: 186 MG/DL — HIGH (ref 70–99)
GLUCOSE BLDC GLUCOMTR-MCNC: 196 MG/DL — HIGH (ref 70–99)
GLUCOSE SERPL-MCNC: 116 MG/DL — HIGH (ref 70–99)
GLUCOSE SERPL-MCNC: 191 MG/DL — HIGH (ref 70–99)
HCT VFR BLD CALC: 29.1 % — LOW (ref 39–50)
HCT VFR BLD CALC: 29.4 % — LOW (ref 39–50)
HGB BLD-MCNC: 8.9 G/DL — LOW (ref 13–17)
HGB BLD-MCNC: 9 G/DL — LOW (ref 13–17)
INR BLD: 1.11 — SIGNIFICANT CHANGE UP (ref 0.88–1.16)
INR BLD: 1.19 — HIGH (ref 0.88–1.16)
LACTATE SERPL-SCNC: 2 MMOL/L — SIGNIFICANT CHANGE UP (ref 0.5–2)
MAGNESIUM SERPL-MCNC: 2 MG/DL — SIGNIFICANT CHANGE UP (ref 1.6–2.6)
MAGNESIUM SERPL-MCNC: 2 MG/DL — SIGNIFICANT CHANGE UP (ref 1.6–2.6)
MCHC RBC-ENTMCNC: 29.5 PG — SIGNIFICANT CHANGE UP (ref 27–34)
MCHC RBC-ENTMCNC: 29.7 PG — SIGNIFICANT CHANGE UP (ref 27–34)
MCHC RBC-ENTMCNC: 30.6 G/DL — LOW (ref 32–36)
MCHC RBC-ENTMCNC: 30.6 G/DL — LOW (ref 32–36)
MCV RBC AUTO: 96.4 FL — SIGNIFICANT CHANGE UP (ref 80–100)
MCV RBC AUTO: 97 FL — SIGNIFICANT CHANGE UP (ref 80–100)
PHOSPHATE SERPL-MCNC: 3 MG/DL — SIGNIFICANT CHANGE UP (ref 2.5–4.5)
PHOSPHATE SERPL-MCNC: 3.7 MG/DL — SIGNIFICANT CHANGE UP (ref 2.5–4.5)
PLATELET # BLD AUTO: 265 K/UL — SIGNIFICANT CHANGE UP (ref 150–400)
PLATELET # BLD AUTO: 298 K/UL — SIGNIFICANT CHANGE UP (ref 150–400)
POTASSIUM SERPL-MCNC: 3.9 MMOL/L — SIGNIFICANT CHANGE UP (ref 3.5–5.3)
POTASSIUM SERPL-MCNC: 4.4 MMOL/L — SIGNIFICANT CHANGE UP (ref 3.5–5.3)
POTASSIUM SERPL-SCNC: 3.9 MMOL/L — SIGNIFICANT CHANGE UP (ref 3.5–5.3)
POTASSIUM SERPL-SCNC: 4.4 MMOL/L — SIGNIFICANT CHANGE UP (ref 3.5–5.3)
PROT SERPL-MCNC: 5.6 G/DL — LOW (ref 6–8.3)
PROT SERPL-MCNC: 6.2 G/DL — SIGNIFICANT CHANGE UP (ref 6–8.3)
PROTHROM AB SERPL-ACNC: 12.4 SEC — SIGNIFICANT CHANGE UP (ref 9.8–12.7)
PROTHROM AB SERPL-ACNC: 13.2 SEC — HIGH (ref 9.8–12.7)
RBC # BLD: 3.02 M/UL — LOW (ref 4.2–5.8)
RBC # BLD: 3.03 M/UL — LOW (ref 4.2–5.8)
RBC # FLD: 15.6 % — SIGNIFICANT CHANGE UP (ref 10.3–16.9)
RBC # FLD: 15.8 % — SIGNIFICANT CHANGE UP (ref 10.3–16.9)
SODIUM SERPL-SCNC: 145 MMOL/L — SIGNIFICANT CHANGE UP (ref 135–145)
SODIUM SERPL-SCNC: 147 MMOL/L — HIGH (ref 135–145)
WBC # BLD: 11.9 K/UL — HIGH (ref 3.8–10.5)
WBC # BLD: 13.4 K/UL — HIGH (ref 3.8–10.5)
WBC # FLD AUTO: 11.9 K/UL — HIGH (ref 3.8–10.5)
WBC # FLD AUTO: 13.4 K/UL — HIGH (ref 3.8–10.5)

## 2018-07-16 PROCEDURE — 71045 X-RAY EXAM CHEST 1 VIEW: CPT | Mod: 26

## 2018-07-16 PROCEDURE — 99292 CRITICAL CARE ADDL 30 MIN: CPT

## 2018-07-16 PROCEDURE — 74220 X-RAY XM ESOPHAGUS 1CNTRST: CPT | Mod: 26,59

## 2018-07-16 PROCEDURE — 74230 X-RAY XM SWLNG FUNCJ C+: CPT | Mod: 26

## 2018-07-16 PROCEDURE — 99291 CRITICAL CARE FIRST HOUR: CPT

## 2018-07-16 RX ORDER — ACETAMINOPHEN 500 MG
650 TABLET ORAL EVERY 6 HOURS
Qty: 0 | Refills: 0 | Status: DISCONTINUED | OUTPATIENT
Start: 2018-07-16 | End: 2018-07-18

## 2018-07-16 RX ORDER — DOCUSATE SODIUM 100 MG
100 CAPSULE ORAL
Qty: 0 | Refills: 0 | Status: DISCONTINUED | OUTPATIENT
Start: 2018-07-16 | End: 2018-07-18

## 2018-07-16 RX ORDER — MIRTAZAPINE 45 MG/1
7.5 TABLET, ORALLY DISINTEGRATING ORAL AT BEDTIME
Qty: 0 | Refills: 0 | Status: DISCONTINUED | OUTPATIENT
Start: 2018-07-16 | End: 2018-07-18

## 2018-07-16 RX ORDER — POTASSIUM CHLORIDE 20 MEQ
40 PACKET (EA) ORAL ONCE
Qty: 0 | Refills: 0 | Status: COMPLETED | OUTPATIENT
Start: 2018-07-16 | End: 2018-07-16

## 2018-07-16 RX ADMIN — Medication 12.5 MILLIGRAM(S): at 07:01

## 2018-07-16 RX ADMIN — CHLORHEXIDINE GLUCONATE 1 APPLICATION(S): 213 SOLUTION TOPICAL at 14:02

## 2018-07-16 RX ADMIN — Medication 2: at 14:00

## 2018-07-16 RX ADMIN — HEPARIN SODIUM 5000 UNIT(S): 5000 INJECTION INTRAVENOUS; SUBCUTANEOUS at 21:46

## 2018-07-16 RX ADMIN — Medication 81 MILLIGRAM(S): at 12:02

## 2018-07-16 RX ADMIN — Medication 2: at 22:01

## 2018-07-16 RX ADMIN — AMLODIPINE BESYLATE 5 MILLIGRAM(S): 2.5 TABLET ORAL at 07:01

## 2018-07-16 RX ADMIN — SERTRALINE 50 MILLIGRAM(S): 25 TABLET, FILM COATED ORAL at 14:01

## 2018-07-16 RX ADMIN — ATORVASTATIN CALCIUM 80 MILLIGRAM(S): 80 TABLET, FILM COATED ORAL at 21:46

## 2018-07-16 RX ADMIN — AMIODARONE HYDROCHLORIDE 400 MILLIGRAM(S): 400 TABLET ORAL at 07:01

## 2018-07-16 RX ADMIN — Medication 12.5 MILLIGRAM(S): at 19:21

## 2018-07-16 RX ADMIN — AMIODARONE HYDROCHLORIDE 400 MILLIGRAM(S): 400 TABLET ORAL at 21:45

## 2018-07-16 RX ADMIN — PANTOPRAZOLE SODIUM 40 MILLIGRAM(S): 20 TABLET, DELAYED RELEASE ORAL at 12:00

## 2018-07-16 RX ADMIN — Medication 2: at 19:20

## 2018-07-16 RX ADMIN — MIRTAZAPINE 7.5 MILLIGRAM(S): 45 TABLET, ORALLY DISINTEGRATING ORAL at 23:31

## 2018-07-16 RX ADMIN — Medication 40 MILLIGRAM(S): at 07:01

## 2018-07-16 RX ADMIN — HEPARIN SODIUM 5000 UNIT(S): 5000 INJECTION INTRAVENOUS; SUBCUTANEOUS at 07:01

## 2018-07-16 RX ADMIN — HEPARIN SODIUM 5000 UNIT(S): 5000 INJECTION INTRAVENOUS; SUBCUTANEOUS at 14:00

## 2018-07-16 RX ADMIN — Medication 12.5 MILLIGRAM(S): at 14:01

## 2018-07-16 RX ADMIN — Medication 650 MILLIGRAM(S): at 23:33

## 2018-07-16 RX ADMIN — Medication 12.5 MILLIGRAM(S): at 00:40

## 2018-07-16 RX ADMIN — Medication 12.5 MILLIGRAM(S): at 23:31

## 2018-07-16 RX ADMIN — Medication 40 MILLIEQUIVALENT(S): at 19:21

## 2018-07-16 RX ADMIN — AMIODARONE HYDROCHLORIDE 400 MILLIGRAM(S): 400 TABLET ORAL at 14:01

## 2018-07-16 NOTE — SWALLOW VFSS/MBS ASSESSMENT ADULT - SLP PERTINENT HISTORY OF CURRENT PROBLEM
Well-known to me from outpt MBS in 10/2017 and with post-op f/u on 7/12/2018. Complex medical hx and h/o dysphagia now s/p resxn of aberrant subclavian artery & carotid subclavian bypass.

## 2018-07-16 NOTE — SWALLOW VFSS/MBS ASSESSMENT ADULT - DIAGNOSTIC IMPRESSIONS
Persistent severe pharyngoesophageal dysphagia although minimally improved from MBS of 10/2017 with at least a small portion of contrast passing into the esophagus on today's exam, patient remains unable to tolerate PO at this time. Suspect he will require further esophageal intervention, possibly dilation, prior to being able to take an oral diet.

## 2018-07-16 NOTE — SWALLOW VFSS/MBS ASSESSMENT ADULT - ESOPHAGEAL STAGE
A barium esophagram was attempted to view the distal esophagus but was aborted due to patient intolerance of large volume of barium contrast with regurgitation of ingested PO.

## 2018-07-16 NOTE — PROGRESS NOTE ADULT - SUBJECTIVE AND OBJECTIVE BOX
CTICU  CRITICAL  CARE  attending     Hand off received 					   Pertinent clinical, laboratory, radiographic, hemodynamic, echocardiographic, respiratory data, microbiologic data and chart were reviewed and analyzed frequently throughout the course of the day and night  Patient seen and examined with CTS/ SH attending at bedside  Pt is a 63y , Male, HEALTH ISSUES - PROBLEM Dx:  Coronary artery disease: Coronary artery disease  HTN (hypertension): HTN (hypertension)  Dysphagia: Dysphagia      , FAMILY HISTORY:  Family history of breast cancer (Sibling)  Family history of malignant neoplasm of ovary (Mother)  Family history of pancreatic cancer (Father)  PAST MEDICAL & SURGICAL HISTORY:  Coronary artery disease  Dysphagia  HLD (hyperlipidemia)  Stroke  Diabetes  HTN (hypertension)  H/O hernia repair: At age 6 months.  S/P cataract surgery  S/P Botox injection: to [facility]    Patient is a 63y old  Male who presents with a chief complaint of dysphagia and aberrant right subclavian artery (09 Jul 2018 17:43)      14 system review was unremarkable  acute changes include acute respiratory failure  Vital signs, hemodynamic and respiratory parameters were reviewed from the bedside nursing flowsheet.  ICU Vital Signs Last 24 Hrs  T(C): 37 (16 Jul 2018 17:01), Max: 37 (16 Jul 2018 17:01)  T(F): 98.6 (16 Jul 2018 17:01), Max: 98.6 (16 Jul 2018 17:01)  HR: 64 (16 Jul 2018 19:00) (56 - 82)  BP: 145/66 (16 Jul 2018 19:00) (93/50 - 164/74)  BP(mean): 96 (16 Jul 2018 19:00) (65 - 117)  ABP: --  ABP(mean): --  RR: 20 (16 Jul 2018 19:00) (12 - 31)  SpO2: 100% (16 Jul 2018 19:00) (98% - 100%)    Adult Advanced Hemodynamics Last 24 Hrs  CVP(mm Hg): --  CVP(cm H2O): --  CO: --  CI: --  PA: --  PA(mean): --  PCWP: --  SVR: --  SVRI: --  PVR: --  PVRI: --,     Intake and output was reviewed and the fluid balance was calculated  Daily     Daily   I&O's Summary    15 Jul 2018 07:01  -  16 Jul 2018 07:00  --------------------------------------------------------  IN: 850 mL / OUT: 813 mL / NET: 37 mL    16 Jul 2018 07:01  -  16 Jul 2018 20:08  --------------------------------------------------------  IN: 690 mL / OUT: 1025 mL / NET: -335 mL        All lines and drain sites were assessed  Glycemic trend was reviewedCAPILLARY BLOOD GLUCOSE      POCT Blood Glucose.: 186 mg/dL (16 Jul 2018 19:04)    No acute change in mental status  Auscultation of the chest reveals equal bs  Abdomen is soft  Extremities are warm and well perfused  Wounds appear clean and unremarkable  Antibiotics are periop    labs  CBC Full  -  ( 16 Jul 2018 16:38 )  WBC Count : 13.4 K/uL  Hemoglobin : 9.0 g/dL  Hematocrit : 29.4 %  Platelet Count - Automated : 298 K/uL  Mean Cell Volume : 97.0 fL  Mean Cell Hemoglobin : 29.7 pg  Mean Cell Hemoglobin Concentration : 30.6 g/dL  Auto Neutrophil # : x  Auto Lymphocyte # : x  Auto Monocyte # : x  Auto Eosinophil # : x  Auto Basophil # : x  Auto Neutrophil % : x  Auto Lymphocyte % : x  Auto Monocyte % : x  Auto Eosinophil % : x  Auto Basophil % : x    07-16    145  |  96  |  31<H>  ----------------------------<  191<H>  3.9   |  39<H>  |  0.75    Ca    8.4      16 Jul 2018 16:38  Phos  3.7     07-16  Mg     2.0     07-16    TPro  6.2  /  Alb  3.5  /  TBili  1.2  /  DBili  x   /  AST  15  /  ALT  12  /  AlkPhos  50  07-16    PT/INR - ( 16 Jul 2018 16:38 )   PT: 13.2 sec;   INR: 1.19          PTT - ( 16 Jul 2018 16:38 )  PTT:36.8 sec  The current medications were reviewed   MEDICATIONS  (STANDING):  amiodarone    Tablet 400 milliGRAM(s) Oral every 8 hours  amLODIPine   Tablet 5 milliGRAM(s) Oral daily  aspirin enteric coated 81 milliGRAM(s) Oral daily  atorvastatin 80 milliGRAM(s) Oral at bedtime  chlorhexidine 2% Cloths 1 Application(s) Topical daily  dextrose 5%. 1000 milliLiter(s) (50 mL/Hr) IV Continuous <Continuous>  dextrose 50% Injectable 12.5 Gram(s) IV Push once  dextrose 50% Injectable 25 Gram(s) IV Push once  dextrose 50% Injectable 25 Gram(s) IV Push once  docusate sodium Liquid 100 milliGRAM(s) Enteral Tube two times a day  furosemide    Tablet 40 milliGRAM(s) Oral daily  heparin  Injectable 5000 Unit(s) SubCutaneous every 8 hours  insulin lispro (HumaLOG) corrective regimen sliding scale   SubCutaneous Before meals and at bedtime  metoprolol tartrate 12.5 milliGRAM(s) Oral every 6 hours  pantoprazole  Injectable 40 milliGRAM(s) IV Push daily  sertraline 50 milliGRAM(s) Oral daily  sodium chloride 0.9%. 1000 milliLiter(s) (10 mL/Hr) IV Continuous <Continuous>    MEDICATIONS  (PRN):  acetaminophen    Suspension. 650 milliGRAM(s) Enteral Tube every 6 hours PRN Mild Pain (1 - 3)  dextrose 40% Gel 15 Gram(s) Oral once PRN Blood Glucose LESS THAN 70 milliGRAM(s)/deciliter  glucagon  Injectable 1 milliGRAM(s) IntraMuscular once PRN Glucose LESS THAN 70 milligrams/deciliter       PROBLEM LIST/ ASSESSMENT:  HEALTH ISSUES - PROBLEM Dx:  Coronary artery disease: Coronary artery disease  HTN (hypertension): HTN (hypertension)  Dysphagia: Dysphagia      ,   Patient is a 63y old  Male who presents with a chief complaint of dysphagia and aberrant right subclavian artery (09 Jul 2018 17:43)     s/p cardiac surgery      My plan includes :  close hemodynamic, ventilatory and drain monitoring and management per post op routine    Monitor for arrhythmias and monitor parameters for organ perfusion  monitor neurologic status  Head of the bed should remain elevated to 45 deg .   chest PT and IS will be encouraged  monitor adequacy of oxygenation and ventilation and attempt to wean oxygen  Nutritional goals will be met using po eventually , ensure adequate caloric intake and montior the same  Stress ulcer and VTE prophylaxis will be achieved    Glycemic control is satisfactory  Electrolytes have been repleted as necessary and wound care has been carried out. Pain control has been achieved.   agressive physical therapy and early mobility and ambulation goals will be met   The family was updated about the course and plan  CRITICAL CARE TIME SPENT in evaluation and management, reassessments, review and interpretation of labs and x-rays, ventilator and hemodynamic management, formulating a plan and coordinating care: ___90____ MIN.  Time does not include procedural time.  CTICU ATTENDING     					    Marcin Ruth MD

## 2018-07-16 NOTE — SWALLOW VFSS/MBS ASSESSMENT ADULT - PHARYNGEAL PHASE COMMENTS
Thin liquid bolus, both spoon & cup sips, fills the valleculae & pyriform sinuses before the swallow. Swallow trigger is incomplete and characterized by decreased base of tongue retraction, decreased hyolaryngeal excursion & absent epiglottic retroflexion. There is trace penetration before the swallow with tsp sips and mild penetration and silent aspiration during the swallow on cup sip. A cued throat clear is effective at eliminating trace penetration from the laryngeal vestibule. On secondary swallows residual bolus is squeezed superiorly back up to the base of tongue. After the third tsp trial, patient was cued to spit out oral residue. On cup sip trial there is to-and-fro of bolus in the oral cavity. During attempted pharyngeal swallow ~60% of bolus is regurgitated and spit out, ~30% remains in the pyriform sinuses and valleculae & ~10% enters the cervical esophagus. Contrast is retained in the esophagus, with the exception of a thin hair-like strand, at approximately the level of T4.

## 2018-07-17 LAB
ANION GAP SERPL CALC-SCNC: 5 MMOL/L — SIGNIFICANT CHANGE UP (ref 5–17)
APTT BLD: 30.1 SEC — SIGNIFICANT CHANGE UP (ref 27.5–37.4)
BUN SERPL-MCNC: 36 MG/DL — HIGH (ref 7–23)
CALCIUM SERPL-MCNC: 8.5 MG/DL — SIGNIFICANT CHANGE UP (ref 8.4–10.5)
CHLORIDE SERPL-SCNC: 96 MMOL/L — SIGNIFICANT CHANGE UP (ref 96–108)
CO2 SERPL-SCNC: 42 MMOL/L — HIGH (ref 22–31)
CREAT SERPL-MCNC: 0.7 MG/DL — SIGNIFICANT CHANGE UP (ref 0.5–1.3)
GLUCOSE BLDC GLUCOMTR-MCNC: 244 MG/DL — HIGH (ref 70–99)
GLUCOSE BLDC GLUCOMTR-MCNC: 260 MG/DL — HIGH (ref 70–99)
GLUCOSE BLDC GLUCOMTR-MCNC: 274 MG/DL — HIGH (ref 70–99)
GLUCOSE SERPL-MCNC: 221 MG/DL — HIGH (ref 70–99)
HCT VFR BLD CALC: 32.5 % — LOW (ref 39–50)
HGB BLD-MCNC: 10 G/DL — LOW (ref 13–17)
INR BLD: 1.19 — HIGH (ref 0.88–1.16)
MAGNESIUM SERPL-MCNC: 2 MG/DL — SIGNIFICANT CHANGE UP (ref 1.6–2.6)
MCHC RBC-ENTMCNC: 29.9 PG — SIGNIFICANT CHANGE UP (ref 27–34)
MCHC RBC-ENTMCNC: 30.8 G/DL — LOW (ref 32–36)
MCV RBC AUTO: 97.3 FL — SIGNIFICANT CHANGE UP (ref 80–100)
PLATELET # BLD AUTO: 338 K/UL — SIGNIFICANT CHANGE UP (ref 150–400)
POTASSIUM SERPL-MCNC: 4.6 MMOL/L — SIGNIFICANT CHANGE UP (ref 3.5–5.3)
POTASSIUM SERPL-SCNC: 4.6 MMOL/L — SIGNIFICANT CHANGE UP (ref 3.5–5.3)
PROTHROM AB SERPL-ACNC: 13.3 SEC — HIGH (ref 9.8–12.7)
RBC # BLD: 3.34 M/UL — LOW (ref 4.2–5.8)
RBC # FLD: 15.5 % — SIGNIFICANT CHANGE UP (ref 10.3–16.9)
SODIUM SERPL-SCNC: 143 MMOL/L — SIGNIFICANT CHANGE UP (ref 135–145)
WBC # BLD: 13.6 K/UL — HIGH (ref 3.8–10.5)
WBC # FLD AUTO: 13.6 K/UL — HIGH (ref 3.8–10.5)

## 2018-07-17 PROCEDURE — 99291 CRITICAL CARE FIRST HOUR: CPT

## 2018-07-17 PROCEDURE — 71045 X-RAY EXAM CHEST 1 VIEW: CPT | Mod: 26,76

## 2018-07-17 PROCEDURE — 99292 CRITICAL CARE ADDL 30 MIN: CPT

## 2018-07-17 RX ORDER — OLANZAPINE 15 MG/1
2.5 TABLET, FILM COATED ORAL
Qty: 0 | Refills: 0 | Status: DISCONTINUED | OUTPATIENT
Start: 2018-07-17 | End: 2018-07-18

## 2018-07-17 RX ORDER — SALIVA SUBSTITUTE COMB NO.11 351 MG
5 POWDER IN PACKET (EA) MUCOUS MEMBRANE
Qty: 0 | Refills: 0 | Status: DISCONTINUED | OUTPATIENT
Start: 2018-07-17 | End: 2018-07-18

## 2018-07-17 RX ORDER — INSULIN GLARGINE 100 [IU]/ML
5 INJECTION, SOLUTION SUBCUTANEOUS AT BEDTIME
Qty: 0 | Refills: 0 | Status: DISCONTINUED | OUTPATIENT
Start: 2018-07-17 | End: 2018-07-18

## 2018-07-17 RX ADMIN — AMIODARONE HYDROCHLORIDE 400 MILLIGRAM(S): 400 TABLET ORAL at 05:26

## 2018-07-17 RX ADMIN — Medication 650 MILLIGRAM(S): at 22:15

## 2018-07-17 RX ADMIN — AMIODARONE HYDROCHLORIDE 400 MILLIGRAM(S): 400 TABLET ORAL at 21:49

## 2018-07-17 RX ADMIN — Medication 650 MILLIGRAM(S): at 00:17

## 2018-07-17 RX ADMIN — Medication 12.5 MILLIGRAM(S): at 13:06

## 2018-07-17 RX ADMIN — INSULIN GLARGINE 5 UNIT(S): 100 INJECTION, SOLUTION SUBCUTANEOUS at 21:49

## 2018-07-17 RX ADMIN — Medication 100 MILLIGRAM(S): at 05:26

## 2018-07-17 RX ADMIN — AMIODARONE HYDROCHLORIDE 400 MILLIGRAM(S): 400 TABLET ORAL at 13:07

## 2018-07-17 RX ADMIN — Medication 100 MILLIGRAM(S): at 17:02

## 2018-07-17 RX ADMIN — MIRTAZAPINE 7.5 MILLIGRAM(S): 45 TABLET, ORALLY DISINTEGRATING ORAL at 21:49

## 2018-07-17 RX ADMIN — ATORVASTATIN CALCIUM 80 MILLIGRAM(S): 80 TABLET, FILM COATED ORAL at 21:50

## 2018-07-17 RX ADMIN — Medication 81 MILLIGRAM(S): at 13:10

## 2018-07-17 RX ADMIN — Medication 650 MILLIGRAM(S): at 05:26

## 2018-07-17 RX ADMIN — SERTRALINE 50 MILLIGRAM(S): 25 TABLET, FILM COATED ORAL at 13:07

## 2018-07-17 RX ADMIN — Medication 40 MILLIGRAM(S): at 05:26

## 2018-07-17 RX ADMIN — Medication 6: at 23:26

## 2018-07-17 RX ADMIN — Medication 12.5 MILLIGRAM(S): at 23:21

## 2018-07-17 RX ADMIN — HEPARIN SODIUM 5000 UNIT(S): 5000 INJECTION INTRAVENOUS; SUBCUTANEOUS at 21:48

## 2018-07-17 RX ADMIN — CHLORHEXIDINE GLUCONATE 1 APPLICATION(S): 213 SOLUTION TOPICAL at 13:10

## 2018-07-17 RX ADMIN — OLANZAPINE 2.5 MILLIGRAM(S): 15 TABLET, FILM COATED ORAL at 17:02

## 2018-07-17 RX ADMIN — Medication 12.5 MILLIGRAM(S): at 05:26

## 2018-07-17 RX ADMIN — Medication 4: at 13:35

## 2018-07-17 RX ADMIN — HEPARIN SODIUM 5000 UNIT(S): 5000 INJECTION INTRAVENOUS; SUBCUTANEOUS at 13:08

## 2018-07-17 RX ADMIN — PANTOPRAZOLE SODIUM 40 MILLIGRAM(S): 20 TABLET, DELAYED RELEASE ORAL at 13:07

## 2018-07-17 RX ADMIN — Medication 650 MILLIGRAM(S): at 05:55

## 2018-07-17 RX ADMIN — Medication 650 MILLIGRAM(S): at 21:48

## 2018-07-17 RX ADMIN — Medication 6: at 06:37

## 2018-07-17 RX ADMIN — AMLODIPINE BESYLATE 5 MILLIGRAM(S): 2.5 TABLET ORAL at 05:26

## 2018-07-17 RX ADMIN — HEPARIN SODIUM 5000 UNIT(S): 5000 INJECTION INTRAVENOUS; SUBCUTANEOUS at 05:25

## 2018-07-17 NOTE — PROGRESS NOTE ADULT - SUBJECTIVE AND OBJECTIVE BOX
CTICU  CRITICAL  CARE  attending     Hand off received 					   Pertinent clinical, laboratory, radiographic, hemodynamic, echocardiographic, respiratory data, microbiologic data and chart were reviewed and analyzed frequently throughout the course of the day and night  Patient seen and examined with CTS/ SH attending at bedside  Pt is a 63y , Male, HEALTH ISSUES - PROBLEM Dx:  Coronary artery disease: Coronary artery disease  HTN (hypertension): HTN (hypertension)  Dysphagia: Dysphagia      , FAMILY HISTORY:  Family history of breast cancer (Sibling)  Family history of malignant neoplasm of ovary (Mother)  Family history of pancreatic cancer (Father)  PAST MEDICAL & SURGICAL HISTORY:  Coronary artery disease  Dysphagia  HLD (hyperlipidemia)  Stroke  Diabetes  HTN (hypertension)  H/O hernia repair: At age 6 months.  S/P cataract surgery  S/P Botox injection: to UES    Patient is a 63y old  Male who presents with a chief complaint of dysphagia and aberrant right subclavian artery (2018 17:43)      14 system review was unremarkable  acute changes include acute respiratory failure  Vital signs, hemodynamic and respiratory parameters were reviewed from the bedside nursing flowsheet.  ICU Vital Signs Last 24 Hrs  T(C): 36.7 (2018 17:04), Max: 37.1 (2018 09:31)  T(F): 98.1 (2018 17:04), Max: 98.7 (2018 09:31)  HR: 60 (2018 21:00) (54 - 74)  BP: 151/73 (2018 21:00) (101/58 - 173/74)  BP(mean): 98 (2018 21:00) (72 - 118)  ABP: --  ABP(mean): --  RR: 21 (2018 21:00) (13 - 33)  SpO2: 96% (2018 21:00) (92% - 100%)    Adult Advanced Hemodynamics Last 24 Hrs  CVP(mm Hg): --  CVP(cm H2O): --  CO: --  CI: --  PA: --  PA(mean): --  PCWP: --  SVR: --  SVRI: --  PVR: --  PVRI: --,     Intake and output was reviewed and the fluid balance was calculated  Daily     Daily Weight in k.7 (2018 05:00)  I&O's Summary    2018 07:01  -  2018 07:00  --------------------------------------------------------  IN: 1570 mL / OUT: 1460 mL / NET: 110 mL    2018 07:01  -  2018 21:40  --------------------------------------------------------  IN: 840 mL / OUT: 775 mL / NET: 65 mL        All lines and drain sites were assessed  [facility]      POCT Blood Glucose.: 244 mg/dL (2018 13:34)    No acute change in mental status  Auscultation of the chest reveals equal bs  Abdomen is soft  Extremities are warm and well perfused  Wounds appear clean and unremarkable  Antibiotics are periop    labs  CBC Full  -  ( 2018 03:20 )  WBC Count : 13.6 K/uL  Hemoglobin : 10.0 g/dL  Hematocrit : 32.5 %  Platelet Count - Automated : 338 K/uL  Mean Cell Volume : 97.3 fL  Mean Cell Hemoglobin : 29.9 pg  Mean Cell Hemoglobin Concentration : 30.8 g/dL  Auto Neutrophil # : x  Auto Lymphocyte # : x  Auto Monocyte # : x  Auto Eosinophil # : x  Auto Basophil # : x  Auto Neutrophil % : x  Auto Lymphocyte % : x  Auto Monocyte % : x  Auto Eosinophil % : x  Auto Basophil % : x        143  |  96  |  36<H>  ----------------------------<  221<H>  4.6   |  42<H>  |  0.70    Ca    8.5      2018 03:20  Phos  3.7     07-16  Mg     2.0     -17    TPro  6.2  /  Alb  3.5  /  TBili  1.2  /  DBili  x   /  AST  15  /  ALT  12  /  AlkPhos  50  07-16    PT/INR - ( 2018 03:20 )   PT: 13.3 sec;   INR: 1.19          PTT - ( 2018 03:20 )  PTT:30.1 sec  The current medications were reviewed   MEDICATIONS  (STANDING):  amiodarone    Tablet 400 milliGRAM(s) Oral every 8 hours  amLODIPine   Tablet 5 milliGRAM(s) Oral daily  aspirin enteric coated 81 milliGRAM(s) Oral daily  atorvastatin 80 milliGRAM(s) Oral at bedtime  Biotene Dry Mouth Oral Rinse 5 milliLiter(s) Swish and Spit two times a day  chlorhexidine 2% Cloths 1 Application(s) Topical daily  dextrose 5%. 1000 milliLiter(s) (50 mL/Hr) IV Continuous <Continuous>  dextrose 50% Injectable 12.5 Gram(s) IV Push once  dextrose 50% Injectable 25 Gram(s) IV Push once  dextrose 50% Injectable 25 Gram(s) IV Push once  docusate sodium Liquid 100 milliGRAM(s) Enteral Tube two times a day  furosemide    Tablet 40 milliGRAM(s) Oral daily  heparin  Injectable 5000 Unit(s) SubCutaneous every 8 hours  insulin glargine Injectable (LANTUS) 5 Unit(s) SubCutaneous at bedtime  insulin lispro (HumaLOG) corrective regimen sliding scale   SubCutaneous Before meals and at bedtime  metoprolol tartrate 12.5 milliGRAM(s) Oral every 6 hours  mirtazapine 7.5 milliGRAM(s) Oral at bedtime  OLANZapine 2.5 milliGRAM(s) Oral two times a day  pantoprazole  Injectable 40 milliGRAM(s) IV Push daily  sertraline 50 milliGRAM(s) Oral daily  sodium chloride 0.9%. 1000 milliLiter(s) (10 mL/Hr) IV Continuous <Continuous>    MEDICATIONS  (PRN):  acetaminophen    Suspension. 650 milliGRAM(s) Enteral Tube every 6 hours PRN Mild Pain (1 - 3)  dextrose 40% Gel 15 Gram(s) Oral once PRN Blood Glucose LESS THAN 70 milliGRAM(s)/deciliter  glucagon  Injectable 1 milliGRAM(s) IntraMuscular once PRN Glucose LESS THAN 70 milligrams/deciliter       PROBLEM LIST/ ASSESSMENT:  HEALTH ISSUES - PROBLEM Dx:  Coronary artery disease: Coronary artery disease  HTN (hypertension): HTN (hypertension)  Dysphagia: Dysphagia      ,   Patient is a 63y old  Male who presents with a chief complaint of dysphagia and aberrant right subclavian artery (2018 17:43)     s/p cardiac surgery      My plan includes :  close hemodynamic, ventilatory and drain monitoring and management per post op routine    Monitor for arrhythmias and monitor parameters for organ perfusion  monitor neurologic status  Head of the bed should remain elevated to 45 deg .   chest PT and IS will be encouraged  monitor adequacy of oxygenation and ventilation and attempt to wean oxygen  Nutritional goals will be met using po eventually , ensure adequate caloric intake and montior the same  Stress ulcer and VTE prophylaxis will be achieved    Glycemic control is satisfactory  Electrolytes have been repleted as necessary and wound care has been carried out. Pain control has been achieved.   agressive physical therapy and early mobility and ambulation goals will be met   The family was updated about the course and plan  CRITICAL CARE TIME SPENT in evaluation and management, reassessments, review and interpretation of labs and x-rays, ventilator and hemodynamic management, formulating a plan and coordinating care: ___90____ MIN.  Time does not include procedural time.  CTICU ATTENDING     					    Marcin Ruth MD

## 2018-07-17 NOTE — CHART NOTE - NSCHARTNOTEFT_GEN_A_CORE
CT Removal:    Pt seen and examined at bedside.  Case discussed with Dr. Wasserman.  Minimal output from mediastinal donnie.  No air leak appreciated.  Donnie removed without incident per Dr. Wasserman request. Tie down secured and occlusive DSD placed.  CXR no obvious PTX noted.  Pt tolerated procedure well.

## 2018-07-18 ENCOUNTER — TRANSCRIPTION ENCOUNTER (OUTPATIENT)
Age: 63
End: 2018-07-18

## 2018-07-18 VITALS
OXYGEN SATURATION: 94 % | RESPIRATION RATE: 25 BRPM | HEART RATE: 68 BPM | DIASTOLIC BLOOD PRESSURE: 69 MMHG | SYSTOLIC BLOOD PRESSURE: 132 MMHG

## 2018-07-18 LAB
GLUCOSE BLDC GLUCOMTR-MCNC: 147 MG/DL — HIGH (ref 70–99)
GLUCOSE BLDC GLUCOMTR-MCNC: 153 MG/DL — HIGH (ref 70–99)
GLUCOSE BLDC GLUCOMTR-MCNC: 219 MG/DL — HIGH (ref 70–99)

## 2018-07-18 PROCEDURE — 97116 GAIT TRAINING THERAPY: CPT

## 2018-07-18 PROCEDURE — 80076 HEPATIC FUNCTION PANEL: CPT

## 2018-07-18 PROCEDURE — 93880 EXTRACRANIAL BILAT STUDY: CPT

## 2018-07-18 PROCEDURE — P9012: CPT

## 2018-07-18 PROCEDURE — 82553 CREATINE MB FRACTION: CPT

## 2018-07-18 PROCEDURE — 83735 ASSAY OF MAGNESIUM: CPT

## 2018-07-18 PROCEDURE — 36430 TRANSFUSION BLD/BLD COMPNT: CPT

## 2018-07-18 PROCEDURE — 83880 ASSAY OF NATRIURETIC PEPTIDE: CPT

## 2018-07-18 PROCEDURE — P9045: CPT

## 2018-07-18 PROCEDURE — 82962 GLUCOSE BLOOD TEST: CPT

## 2018-07-18 PROCEDURE — 84132 ASSAY OF SERUM POTASSIUM: CPT

## 2018-07-18 PROCEDURE — 36415 COLL VENOUS BLD VENIPUNCTURE: CPT

## 2018-07-18 PROCEDURE — 92611 MOTION FLUOROSCOPY/SWALLOW: CPT | Mod: GN

## 2018-07-18 PROCEDURE — 82330 ASSAY OF CALCIUM: CPT

## 2018-07-18 PROCEDURE — 94150 VITAL CAPACITY TEST: CPT

## 2018-07-18 PROCEDURE — P9035: CPT

## 2018-07-18 PROCEDURE — 74230 X-RAY XM SWLNG FUNCJ C+: CPT

## 2018-07-18 PROCEDURE — 85025 COMPLETE CBC W/AUTO DIFF WBC: CPT

## 2018-07-18 PROCEDURE — 74220 X-RAY XM ESOPHAGUS 1CNTRST: CPT

## 2018-07-18 PROCEDURE — 97162 PT EVAL MOD COMPLEX 30 MIN: CPT

## 2018-07-18 PROCEDURE — 71045 X-RAY EXAM CHEST 1 VIEW: CPT | Mod: 26

## 2018-07-18 PROCEDURE — 71045 X-RAY EXAM CHEST 1 VIEW: CPT

## 2018-07-18 PROCEDURE — 84443 ASSAY THYROID STIM HORMONE: CPT

## 2018-07-18 PROCEDURE — 85027 COMPLETE CBC AUTOMATED: CPT

## 2018-07-18 PROCEDURE — 84295 ASSAY OF SERUM SODIUM: CPT

## 2018-07-18 PROCEDURE — 85610 PROTHROMBIN TIME: CPT

## 2018-07-18 PROCEDURE — 93005 ELECTROCARDIOGRAM TRACING: CPT

## 2018-07-18 PROCEDURE — 86900 BLOOD TYPING SEROLOGIC ABO: CPT

## 2018-07-18 PROCEDURE — 86923 COMPATIBILITY TEST ELECTRIC: CPT

## 2018-07-18 PROCEDURE — 85730 THROMBOPLASTIN TIME PARTIAL: CPT

## 2018-07-18 PROCEDURE — 80048 BASIC METABOLIC PNL TOTAL CA: CPT

## 2018-07-18 PROCEDURE — 86901 BLOOD TYPING SEROLOGIC RH(D): CPT

## 2018-07-18 PROCEDURE — 84100 ASSAY OF PHOSPHORUS: CPT

## 2018-07-18 PROCEDURE — P9016: CPT

## 2018-07-18 PROCEDURE — 70450 CT HEAD/BRAIN W/O DYE: CPT

## 2018-07-18 PROCEDURE — 83605 ASSAY OF LACTIC ACID: CPT

## 2018-07-18 PROCEDURE — 84484 ASSAY OF TROPONIN QUANT: CPT

## 2018-07-18 PROCEDURE — 86850 RBC ANTIBODY SCREEN: CPT

## 2018-07-18 PROCEDURE — 80061 LIPID PANEL: CPT

## 2018-07-18 PROCEDURE — 82550 ASSAY OF CK (CPK): CPT

## 2018-07-18 PROCEDURE — C1889: CPT

## 2018-07-18 PROCEDURE — 94002 VENT MGMT INPAT INIT DAY: CPT

## 2018-07-18 PROCEDURE — 99232 SBSQ HOSP IP/OBS MODERATE 35: CPT

## 2018-07-18 PROCEDURE — P9017: CPT

## 2018-07-18 PROCEDURE — 92610 EVALUATE SWALLOWING FUNCTION: CPT | Mod: GN

## 2018-07-18 PROCEDURE — 82803 BLOOD GASES ANY COMBINATION: CPT

## 2018-07-18 PROCEDURE — 80053 COMPREHEN METABOLIC PANEL: CPT

## 2018-07-18 PROCEDURE — C1768: CPT

## 2018-07-18 PROCEDURE — 97161 PT EVAL LOW COMPLEX 20 MIN: CPT

## 2018-07-18 RX ORDER — PANTOPRAZOLE SODIUM 20 MG/1
1 TABLET, DELAYED RELEASE ORAL
Qty: 30 | Refills: 0 | OUTPATIENT
Start: 2018-07-18

## 2018-07-18 RX ORDER — ASPIRIN/CALCIUM CARB/MAGNESIUM 324 MG
1 TABLET ORAL
Qty: 0 | Refills: 0 | COMMUNITY

## 2018-07-18 RX ORDER — ASPIRIN/CALCIUM CARB/MAGNESIUM 324 MG
1 TABLET ORAL
Qty: 30 | Refills: 0 | OUTPATIENT
Start: 2018-07-18 | End: 2018-08-16

## 2018-07-18 RX ORDER — INSULIN GLARGINE 100 [IU]/ML
5 INJECTION, SOLUTION SUBCUTANEOUS
Qty: 0 | Refills: 0 | COMMUNITY
Start: 2018-07-18

## 2018-07-18 RX ORDER — AMIODARONE HYDROCHLORIDE 400 MG/1
1 TABLET ORAL
Qty: 0 | Refills: 0 | COMMUNITY
Start: 2018-07-18

## 2018-07-18 RX ORDER — MIRTAZAPINE 45 MG/1
1 TABLET, ORALLY DISINTEGRATING ORAL
Qty: 0 | Refills: 0 | COMMUNITY
Start: 2018-07-18

## 2018-07-18 RX ORDER — FUROSEMIDE 40 MG
1 TABLET ORAL
Qty: 0 | Refills: 0 | COMMUNITY
Start: 2018-07-18

## 2018-07-18 RX ORDER — ATORVASTATIN CALCIUM 80 MG/1
1 TABLET, FILM COATED ORAL
Qty: 0 | Refills: 0 | COMMUNITY
Start: 2018-07-18

## 2018-07-18 RX ORDER — DOCUSATE SODIUM 100 MG
10 CAPSULE ORAL
Qty: 0 | Refills: 0 | COMMUNITY
Start: 2018-07-18

## 2018-07-18 RX ORDER — AMLODIPINE BESYLATE 2.5 MG/1
1 TABLET ORAL
Qty: 0 | Refills: 0 | COMMUNITY

## 2018-07-18 RX ORDER — SERTRALINE 25 MG/1
1 TABLET, FILM COATED ORAL
Qty: 0 | Refills: 0 | COMMUNITY
Start: 2018-07-18

## 2018-07-18 RX ORDER — ACETAMINOPHEN 500 MG
20.31 TABLET ORAL
Qty: 0 | Refills: 0 | COMMUNITY
Start: 2018-07-18

## 2018-07-18 RX ORDER — METOPROLOL TARTRATE 50 MG
12.5 TABLET ORAL
Qty: 0 | Refills: 0 | COMMUNITY
Start: 2018-07-18

## 2018-07-18 RX ORDER — AMLODIPINE BESYLATE 2.5 MG/1
1 TABLET ORAL
Qty: 0 | Refills: 0 | COMMUNITY
Start: 2018-07-18

## 2018-07-18 RX ORDER — OLANZAPINE 15 MG/1
1 TABLET, FILM COATED ORAL
Qty: 0 | Refills: 0 | COMMUNITY
Start: 2018-07-18

## 2018-07-18 RX ADMIN — Medication 2: at 07:27

## 2018-07-18 RX ADMIN — PANTOPRAZOLE SODIUM 40 MILLIGRAM(S): 20 TABLET, DELAYED RELEASE ORAL at 11:31

## 2018-07-18 RX ADMIN — Medication 81 MILLIGRAM(S): at 11:31

## 2018-07-18 RX ADMIN — CHLORHEXIDINE GLUCONATE 1 APPLICATION(S): 213 SOLUTION TOPICAL at 11:32

## 2018-07-18 RX ADMIN — OLANZAPINE 2.5 MILLIGRAM(S): 15 TABLET, FILM COATED ORAL at 17:15

## 2018-07-18 RX ADMIN — HEPARIN SODIUM 5000 UNIT(S): 5000 INJECTION INTRAVENOUS; SUBCUTANEOUS at 05:49

## 2018-07-18 RX ADMIN — Medication 40 MILLIGRAM(S): at 05:49

## 2018-07-18 RX ADMIN — SERTRALINE 50 MILLIGRAM(S): 25 TABLET, FILM COATED ORAL at 11:31

## 2018-07-18 RX ADMIN — HEPARIN SODIUM 5000 UNIT(S): 5000 INJECTION INTRAVENOUS; SUBCUTANEOUS at 13:14

## 2018-07-18 RX ADMIN — Medication 5 MILLILITER(S): at 05:50

## 2018-07-18 RX ADMIN — Medication 5 MILLILITER(S): at 17:15

## 2018-07-18 RX ADMIN — Medication 100 MILLIGRAM(S): at 05:49

## 2018-07-18 RX ADMIN — OLANZAPINE 2.5 MILLIGRAM(S): 15 TABLET, FILM COATED ORAL at 05:49

## 2018-07-18 RX ADMIN — AMLODIPINE BESYLATE 5 MILLIGRAM(S): 2.5 TABLET ORAL at 05:49

## 2018-07-18 RX ADMIN — Medication 4: at 17:15

## 2018-07-18 RX ADMIN — Medication 12.5 MILLIGRAM(S): at 05:49

## 2018-07-18 RX ADMIN — Medication 650 MILLIGRAM(S): at 06:20

## 2018-07-18 RX ADMIN — Medication 650 MILLIGRAM(S): at 05:51

## 2018-07-18 NOTE — DISCHARGE NOTE ADULT - HOSPITAL COURSE
This is a 62 year old male with history of HTN, HLD, DM, CVA in 5/2017 with left sided hemiparesis, dysphagia     62 year old Male with PMHx significant for HTN, HLD, DM, CVA 5/4/17 resulting in left sided hemiparesis, dysphagia, and PEG placement. Patient lost 110 pounds since his stroke and has been evaluated by various specialists including [facility]- Dr. George, neuro- Dr. Howell, Gi - Dr. Rosenberg and Dr. Sood and thoracic surgery - Dr. Kam. Patient underwent Videofluoroscopic swallow study on 10/27/17 which revealed pharyngeoesophageal swallow is severely profoundly impaired. Patient underwent high resolution esophageal motility study 1/2/18 revealing achalasia with Type III pattern. S/p EDG with esophageal POEM on 2/15/18 which revealed hypertensive esophagus s/p perioral endoscopic myotomy. 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 CTA Neck and CTA coronaries were performed in 6/2018 and patient was referred to Dr. Boyd by Dr. Kam. On review of the CT angiogram/coronary angiogram, patient has evidence of aberrant right subclavian artery with origin from the proximal arch, the course is posterior to the esophogram, the esophagus is dilated proximal to this. The patient was recommended to Dr. Boyd for consideration of resection of the aberrant subclavian artery, which appears to be causing an anatomic obstruction of the esophagus with proximal dilatation resulting in dysphagia, with concomitant carotid subclavian bypass. The patient presents to [facility] for surgical intervention with Dr. Boyd planned for 7/10/18. Patient denies any swallowing problems in his childhood or prior to CVA. He now reports inability to enjoy life secondary to dysphagia and achalasia. He is now s/p POEM on 2/2018 and Botox injections to UES 5/2018 with no relief of symptoms. Patient is in no acute distress, denies recent hospitalization, ER visits or surgeries. Denies chest pain, SOB, palpitations, hemopytsis, N/V/D, abdominal pain, dizziness, fever or chills. Patient is ambulating with a cane. This is a 62 year old male with history of HTN, HLD, DM, CVA in 5/2017 with left sided hemiparesis, dysphagia/achalasia s/p PEG placement, with recent finding of anomalous subclavian artery concerning for external compression/stenosis of posterior esophagus with CT showing ectatic arch with anomalus right subclavian artery.  He was referred to Dr. Boyd for surgical evaluation, completed pre-op workup which also demonstrated 3vCAD and was admitted to [facility] on 7/9/18 and on 7/10, underwent sternotomy CABG x 3 (LIMA to LAD, SVG to PDA, SVG to Diag), aorto-right subclavian bypass, stapling/dividing fo anomalous right subclavian artery and EGD.  Intraoperatively, he received 4u PC, 1 PLT, 1FFP, 5u Cryo and arrived to CTICU in stable condition on levo/vaso.  He was extubated on POD 1 successfully and pressors were weaned off.  On POD 2, he failed speech and swallow and was kept NPO with tube feeds at goal via PEG.  On POD 4, he developed new onset Afib with RVR and was given IV Amio bolus, started on PO Amio load with conversion to NSR afterward.  On POD 6, he underwent barium esophagram which revealed 2 areas of narrowing within the cervical esophagus and slow passage of barium into the bowel and he was kept NPO.  The remainder of his ICU course remained uncomplicated, he ambulated with PT on room air and on 7/18/18, was medically cleared for discharge to [facility].  Over 30 minutes was spent with the patient reviewing the discharge material including medications, follow up appointments, recovery, concerning symptoms, and how to contact their health care providers if they have questions.

## 2018-07-18 NOTE — PROGRESS NOTE ADULT - ASSESSMENT
-Dr. Boyd will follow-up with you in 1-2 weeks.  His office will call you with an appointment.  The office is located at [facility], [facility], 4th floor. Call us with any questions #[phone].    -Dr. Kam, Thoracic Surgery, will also follow-up with you in 1-2 weeks.  His office will contact you with an appointment date and time.  Office information is included in your discharge paperwork.

## 2018-07-18 NOTE — CHART NOTE - NSCHARTNOTEFT_GEN_A_CORE
Admitting Diagnosis:   Patient is a 63y old  Male who presents with a chief complaint of dysphagia and aberrant right subclavian artery (2018 17:43)      PAST MEDICAL & SURGICAL HISTORY:  Coronary artery disease  Dysphagia  HLD (hyperlipidemia)  Stroke  Diabetes  HTN (hypertension)  H/O hernia repair: At age 6 months.  S/P cataract surgery  S/P Botox injection: to [facility]      Current Nutrition Order:  Glucerna 1.5 via PEG @ 60ml/hr x 24hr; infusing at ordered goal rate    GI Issues:   Denies N/V/D/C    Pain:  Controlled     Skin Integrity:  L malleolus unstageable PU  L EVH  R upper chest ASW  MSI      Labs:       143  |  96  |  36<H>  ----------------------------<  221<H>  4.6   |  42<H>  |  0.70    Ca    8.5      2018 03:20  Phos  3.7     -  Mg     2.0         TPro  6.2  /  Alb  3.5  /  TBili  1.2  /  DBili  x   /  AST  15  /  ALT  12  /  AlkPhos  50  -    CAPILLARY BLOOD GLUCOSE      POCT Blood Glucose.: 147 mg/dL (2018 11:44)  POCT Blood Glucose.: 153 mg/dL (2018 07:25)  POCT Blood Glucose.: 260 mg/dL (2018 23:23)  POCT Blood Glucose.: 244 mg/dL (2018 13:34)      Medications:  MEDICATIONS  (STANDING):  amiodarone    Tablet 200 milliGRAM(s) Oral daily  amLODIPine   Tablet 5 milliGRAM(s) Oral daily  aspirin enteric coated 81 milliGRAM(s) Oral daily  atorvastatin 80 milliGRAM(s) Oral at bedtime  Biotene Dry Mouth Oral Rinse 5 milliLiter(s) Swish and Spit two times a day  chlorhexidine 2% Cloths 1 Application(s) Topical daily  dextrose 5%. 1000 milliLiter(s) (50 mL/Hr) IV Continuous <Continuous>  dextrose 50% Injectable 12.5 Gram(s) IV Push once  dextrose 50% Injectable 25 Gram(s) IV Push once  dextrose 50% Injectable 25 Gram(s) IV Push once  docusate sodium Liquid 100 milliGRAM(s) Enteral Tube two times a day  furosemide    Tablet 40 milliGRAM(s) Oral daily  heparin  Injectable 5000 Unit(s) SubCutaneous every 8 hours  insulin glargine Injectable (LANTUS) 5 Unit(s) SubCutaneous at bedtime  insulin lispro (HumaLOG) corrective regimen sliding scale   SubCutaneous Before meals and at bedtime  metoprolol tartrate 12.5 milliGRAM(s) Oral every 6 hours  mirtazapine 7.5 milliGRAM(s) Oral at bedtime  OLANZapine 2.5 milliGRAM(s) Oral two times a day  pantoprazole  Injectable 40 milliGRAM(s) IV Push daily  sertraline 50 milliGRAM(s) Oral daily  sodium chloride 0.9%. 1000 milliLiter(s) (10 mL/Hr) IV Continuous <Continuous>    MEDICATIONS  (PRN):  acetaminophen    Suspension. 650 milliGRAM(s) Enteral Tube every 6 hours PRN Mild Pain (1 - 3)  dextrose 40% Gel 15 Gram(s) Oral once PRN Blood Glucose LESS THAN 70 milliGRAM(s)/deciliter  glucagon  Injectable 1 milliGRAM(s) IntraMuscular once PRN Glucose LESS THAN 70 milligrams/deciliter      Weight:  57.4kg ()    Daily Weight in k.6 (2018 00:00)      Weight Change:   4.2kg wt gain noted; continue to trend    Estimated energy needs:   ABW 57.4kg used for EER and adjusted for post-op/malnutrition  30-35kcal/kg (1722-2009kcal), 1.4-1.6g/kg (80-92g), 30-35ml/kg (1722-2009ml)    Subjective:   61y/o M s/p CABG x3, Aorto to Right subclavian bypass, Stapling and divining of Right subclavian artery, Esophagogastroduodenoscopy. SLP () recommended to continue NPO with PEG feeds as pt failed barium esophagram. Pt seen resting in chair with wife at bedside. TF changed to appropriate home regimen and infusing at goal rate. Pt denies N/V/D/C and pain. Discussed EN meeting EER. RN confirms TF tolerance without issues. Will follow.     Previous Nutrition Diagnosis:  Inadequate enteral nutrition infusion RT infusion rate AEB pt meeting <50% of EER on trophic feeds    Active [   ]  Resolved [X]    If resolved, new PES:   Swallowing difficulty RT decline in swallow function AEB need for alternate means of nutrition/hydration; PEG feeds    Goal:  Pt to meet 100% of EER via EN     Recommendations:  1. Continue Glucerna 1.5 with goal rate of 60ml/hr x 24hr (1440ml TV, 2160kcal, 118g pro, 1093ml free water); team can transition to bolus feeds, 6cans/day via PEG if preferred as it was the pt's prior home regimen-->(1422ml TV, 2136kcal, 118g pro, 1080ml water). Monitor for s/s of intolerance and maintain aspiration precautions. Fluids per team.   2. Monitor lytes and replete prn.   3. Trend wts 2x/week.    *d/w RN*    Education:   EN meeting EER; promote wt regain with increased needs    Risk Level: High [X] Moderate [   ] Low [   ].

## 2018-07-18 NOTE — DISCHARGE NOTE ADULT - NS AS ACTIVITY OBS
No Heavy lifting/straining/Walking-Indoors allowed/Showering allowed/Do not make important decisions/Do not drive or operate machinery/Walking-Outdoors allowed

## 2018-07-18 NOTE — DISCHARGE NOTE ADULT - MEDICATION SUMMARY - MEDICATIONS TO STOP TAKING
I will STOP taking the medications listed below when I get home from the hospital:    repaglinide 1 mg oral tablet  -- 1 tab(s) by mouth 3 times a day (before meals)

## 2018-07-18 NOTE — DISCHARGE NOTE ADULT - PATIENT PORTAL LINK FT
You can access the [facility] Patient Portal, offered by [facility], by registering with the following website: [email or web address]

## 2018-07-18 NOTE — DISCHARGE NOTE ADULT - CARE PROVIDERS DIRECT ADDRESSES
[email or web address]

## 2018-07-18 NOTE — DISCHARGE NOTE ADULT - MEDICATION SUMMARY - MEDICATIONS TO TAKE
I will START or STAY ON the medications listed below when I get home from the hospital:    acetaminophen 160 mg/5 mL oral suspension  -- 20.31 milliliter(s) by mouth every 6 hours, As needed, Mild Pain (1 - 3)  -- Indication: For pain    amiodarone 200 mg oral tablet  -- 1 tab(s) by gastrostomy tube once a day  -- Indication: For A.fib    sertraline 50 mg oral tablet  -- 1 tab(s) by gastrostomy tube once a day  -- Indication: For Depression    mirtazapine 7.5 mg oral tablet  -- 1 tab(s) by gastrostomy tube once a day (at bedtime)  -- Indication: For Depression    insulin glargine  -- 5 unit(s) subcutaneous once a day (at bedtime)  -- Indication: For DM    atorvastatin 80 mg oral tablet  -- 1 tab(s) by gastrostomy tube once a day (at bedtime)  -- Indication: For Hyperlipidemia    OLANZapine 2.5 mg oral tablet  -- 1 tab(s) by gastrostomy tube 2 times a day  -- Indication: For Anxiety/Depression    metoprolol  -- 12.5 milligram(s) by gastrostomy tube 2 times a day  -- Indication: For HTN (hypertension)    amLODIPine 5 mg oral tablet  -- 1 tab(s) by gastrostomy tube once a day  -- Indication: For HTN (hypertension)    furosemide 40 mg oral tablet  -- 1 tab(s) by gastrostomy tube once a day  -- Indication: For HTN (hypertension)    docusate sodium 10 mg/mL oral liquid  -- 10 milliliter(s) by gastrostomy tube 2 times a day  -- Indication: For Constipation

## 2018-07-18 NOTE — PROGRESS NOTE ADULT - SUBJECTIVE AND OBJECTIVE BOX
Patient discussed on morning rounds with Dr. Boyd       Operation / Date: 7/10 CABG x 3 (LIMA to LAD, SVG to PDA, SVG to Diag), aorto-right subclavian bypass, stapling/dividing fo anomalous right subclavian artery and EGD.    Surgeon: Dr. Boyd     Referring Physician: Dr. Kam     SUBJECTIVE ASSESSMENT:  63y Male seen at bedside this AM.  He feels well today, continues to feel weak but feels ready to go to rehab today.  Denies acute overnight events.  Denies HA, AMS, CP, palpitations, SOB, cough, hemoptysis, n/v/d, fever.     Hospital Course:  This is a 62 year old male with history of HTN, HLD, DM, CVA in 5/2017 with left sided hemiparesis, dysphagia/achalasia s/p PEG placement, with recent finding of anomalous subclavian artery concerning for external compression/stenosis of posterior esophagus with CT showing ectatic arch with anomalus right subclavian artery.  He was referred to Dr. Boyd for surgical evaluation, completed pre-op workup which also demonstrated 3vCAD and was admitted to [facility] on 7/9/18 and on 7/10, underwent sternotomy CABG x 3 (LIMA to LAD, SVG to PDA, SVG to Diag), aorto-right subclavian bypass, stapling/dividing fo anomalous right subclavian artery and EGD.  Intraoperatively, he received 4u PC, 1 PLT, 1FFP, 5u Cryo and arrived to CTICU in stable condition on levo/vaso.  He was extubated on POD 1 successfully and pressors were weaned off.  On POD 2, he failed speech and swallow and was kept NPO with tube feeds at goal via PEG.  On POD 4, he developed new onset Afib with RVR and was given IV Amio bolus, started on PO Amio load with conversion to NSR afterward.  On POD 6, he underwent barium esophagram which revealed 2 areas of narrowing within the cervical esophagus and slow passage of barium into the bowel and he was kept NPO.  The remainder of his ICU course remained uncomplicated, he ambulated with PT on room air and on 7/18/18, was medically cleared for discharge to [place] [facility] facility.  Over 30 minutes was spent with the patient reviewing the discharge material including medications, follow up appointments, recovery, concerning symptoms, and how to contact their health care providers if they have questions.     Vital Signs Last 24 Hrs  T(C): 35.9 (18 Jul 2018 16:37), Max: 36.6 (18 Jul 2018 04:00)  T(F): 96.6 (18 Jul 2018 16:37), Max: 97.8 (18 Jul 2018 04:00)  HR: 68 (18 Jul 2018 17:00) (52 - 68)  BP: 132/69 (18 Jul 2018 17:00) (90/45 - 159/66)  BP(mean): 96 (18 Jul 2018 17:00) (59 - 101)  RR: 25 (18 Jul 2018 17:00) (21 - 34)  SpO2: 94% (18 Jul 2018 17:00) (92% - 100%)  I&O's Detail    17 Jul 2018 07:01  -  18 Jul 2018 07:00  --------------------------------------------------------  IN:    Enteral Tube Flush: 240 mL    Glucerna: 1200 mL  Total IN: 1440 mL    OUT:    Voided: 795 mL  Total OUT: 795 mL    Total NET: 645 mL      18 Jul 2018 07:01  -  18 Jul 2018 18:03  --------------------------------------------------------  IN:    Enteral Tube Flush: 60 mL    Glucerna: 660 mL  Total IN: 720 mL    OUT:    Voided: 375 mL  Total OUT: 375 mL    Total NET: 345 mL    EPICARDIAL WIRES REMOVED: Yes  TIE DOWNS REMOVED: Yes.    PHYSICAL EXAM:  General: OOB to chair, no acute distress.   Neurological: AAOx3, answers questions appropriately.   Cardiovascular: RRR, S1/S2, no m/r/g.   Respiratory: No acute distress on RA.  CTA b/l, no w/r/r.   Gastrointestinal: ND, +BS, non-TTP.  PEG tube present.   Extremities: Warm and well perfused, no calf ttp or edema b/l   Vascular: Pulses 2+  Incision Sites: MSI: C/D/I, no click.     LABS:                        10.0   13.6  )-----------( 338      ( 17 Jul 2018 03:20 )             32.5       COUMADIN: No.     PT/INR - ( 17 Jul 2018 03:20 )   PT: 13.3 sec;   INR: 1.19          PTT - ( 17 Jul 2018 03:20 )  PTT:30.1 sec    07-17    143  |  96  |  36<H>  ----------------------------<  221<H>  4.6   |  42<H>  |  0.70    Ca    8.5      17 Jul 2018 03:20  Mg     2.0     07-17      MEDICATIONS  (STANDING):  I will START or STAY ON the medications listed below when I get home from the hospital:    acetaminophen 160 mg/5 mL oral suspension  -- 20.31 milliliter(s) by mouth every 6 hours, As needed, Mild Pain (1 - 3)  -- Indication: For pain    amiodarone 200 mg oral tablet  -- 1 tab(s) by gastrostomy tube once a day  -- Indication: For A.fib    sertraline 50 mg oral tablet  -- 1 tab(s) by gastrostomy tube once a day  -- Indication: For Depression    mirtazapine 7.5 mg oral tablet  -- 1 tab(s) by gastrostomy tube once a day (at bedtime)  -- Indication: For Depression    insulin glargine  -- 5 unit(s) subcutaneous once a day (at bedtime)  -- Indication: For DM    atorvastatin 80 mg oral tablet  -- 1 tab(s) by gastrostomy tube once a day (at bedtime)  -- Indication: For Hyperlipidemia    OLANZapine 2.5 mg oral tablet  -- 1 tab(s) by gastrostomy tube 2 times a day  -- Indication: For Anxiety/Depression    metoprolol  -- 12.5 milligram(s) by gastrostomy tube 2 times a day  -- Indication: For HTN (hypertension)    amLODIPine 5 mg oral tablet  -- 1 tab(s) by gastrostomy tube once a day  -- Indication: For HTN (hypertension)    furosemide 40 mg oral tablet  -- 1 tab(s) by gastrostomy tube once a day  -- Indication: For HTN (hypertension)    docusate sodium 10 mg/mL oral liquid  -- 10 milliliter(s) by gastrostomy tube 2 times a day  -- Indication: For Constipation.     I will STOP taking the medications listed below when I get home from the hospital:    repaglinide 1 mg oral tablet  -- 1 tab(s) by mouth 3 times a day (before meals).     I will SWITCH the dose or number of times a day I take the medications listed below when I get home from the hospital:  None.      Discharge CXR:  < from: Xray Chest 1 View- PORTABLE-Routine (07.18.18 @ 04:17) >  EXAM:  XR CHEST PORTABLE ROUTINE 1V                          PROCEDURE DATE:  07/18/2018          INTERPRETATION:  XR CHEST PORTABLE ROUTINE 1V dated 7/18/2018 4:17 AM    CLINICAL INFORMATION: Male, 63 years old.  post op.    PRIOR STUDIES: 7/17/2018    FINDINGS: The heart size, mediastinal and hilar contours are unchanged.   The patient is poststernotomy and CABG procedure. Patchy opacities are   noted over the lower lung zones which may represent a combination of   multifocal pneumonia and atelectasis.  Is increased opacity noted over   the right upper lung zone medially unchanged from prior imaging.   Technical clips are noted in the left axilla. No definite pneumothorax.    IMPRESSION:  Essentially no significant interval change.    < end of copied text >    < from: Xray Modified Barium Swallow (07.16.18 @ 12:41) >    EXAM:  XR MODIFIED BARIUM SWALLOW                          PROCEDURE DATE:  07/16/2018          INTERPRETATION:  Video Fluoroscopic Swallow Study dated 7/16/2018 12:41 PM    History: 63-year-old status post PEG tube placement with dysphagia.   Recent surgery for repair of vascular ring.    Prior Studies: modified barium swallow 10/27/2017     Total fluoroscopy time: 3.0 minutes.    Findings:    Thin Liquid:                                   Penetration: Positive  Aspiration: Positive  Eliminated with posture/technique: Not applicable    Nectar Thick Liquid:                Penetration: Not applicable  Aspiration: Not applicable  Eliminated with posture/technique: Not applicable    Honey Thick Liquid:                 Penetration: Not applicable  Aspiration: Not applicable  Eliminated with posture/technique: Not applicable    Puree:                 Penetration: Not applicable  Aspiration: Not applicable  Eliminated with posture/technique: Not applicable      Mechanical soft:                             Penetration: Not applicable  Aspiration: Not applicable  Eliminated with posture/technique: Not applicable    Regular consistency:                                             Penetration: Not applicable  Aspiration: Not applicable  Eliminated with posture/technique: Not applicable    Anatomical findings: There are 2 areas of narrowing within the cervical   esophagus, which may be due to post operative edema. There is slow   passage of barium into the bowel. An esophagram was attempted but patient   could not tolerate exam. On the  film barium was noted from the   modified barium swallow within the bowel.    Impression: As above.  Please see speech pathology report in the   patient's chart for complete detailsregarding swallow function.    < end of copied text >    Discharge ECHO:  < from: Echocardiogram (07.02.18 @ 12:28) >  Interpretation Summary  There is mild concentric left ventricular hypertrophy.The left   ventricular wall   motion is normal.The left ventricular ejection fraction is normal.The   left   ventricular ejection fraction is 65%.The right ventricle is normal in   size and   function.The left atrial size is normal.The mitral inflow pattern is   consistent with impaired left ventricular relaxation with mildly   elevated left   atrial pressure (8-14mmHg).  Right atrial size is normal.Calcified aortic   valve.No aortic regurgitation noted.There is Mild aortic stenosis.The   peak   pressure gradient is 15 mmHg.The mean pressure gradient is 7 mmHg.The   calculated aortic valve area using the continuity equation is 1.8   cm2.Mitral   annular calcification noted.Structurally normal mitral valve.There is   trace   mitral regurgitation.Structurally normal tricuspid valve.There is trace   tricuspid regurgitation.The pulmonary artery systolic pressure is   estimated to   be 30 mmHg.Structurally normal pulmonic valve.No pulmonic regurgitation   noted.There is no pericardial effusion.    < end of copied text >

## 2018-07-18 NOTE — DISCHARGE NOTE ADULT - CARE PLAN
Principal Discharge DX:	Subclavian artery disease  Secondary Diagnosis:	Coronary artery disease  Secondary Diagnosis:	Dysphagia Principal Discharge DX:	Subclavian artery disease  Goal:	Successful CABGx3 with Aorto-Right subclavian bypass.  Secondary Diagnosis:	Coronary artery disease  Goal:	S/P CABGx3  Secondary Diagnosis:	Dysphagia  Assessment and plan of treatment:	Continue strict NPO w TF at goal per PEG Principal Discharge DX:	Subclavian artery disease  Goal:	Successful CABGx3 with Aorto-Right subclavian bypass.  Assessment and plan of treatment:	-Dr. Boyd will follow-up with you in 1-2 weeks.  His office will call you with an appointment.  The office is located at [facility], [facility], 4th floor. Call us with any questions #[phone].    -Dr. Kam, Thoracic Surgery, will also follow-up with you in 1-2 weeks.  His office will contact you with an appointment date and time.  Office information is included in your discharge paperwork.     -Walk daily as tolerated and use your incentive spirometer every hour.    -No driving or strenuous activity/exercise for 6 weeks, or until  cleared by your surgeon.    -Gently clean your incisions with anti-bacterial soap and water, pat  dry.  You may leave them open to air.    -Call your doctor if you have shortness of breath, chest pain not  relieved by pain medication, dizziness, fever >101.5, or increased  redness or drainage from incisions.  Secondary Diagnosis:	Coronary artery disease  Goal:	S/P CABGx3  Assessment and plan of treatment:	Refer to above instructions and the discharge manual provided to you prior to discharge.  Secondary Diagnosis:	Dysphagia  Assessment and plan of treatment:	Continue strict NPO w TF at goal per PEG

## 2018-07-18 NOTE — PROGRESS NOTE ADULT - PROVIDER SPECIALTY LIST ADULT
CT Surgery
CT Surgery
Critical Care

## 2018-07-18 NOTE — DISCHARGE NOTE ADULT - CARE PROVIDER_API CALL
Roberto Carlos Boyd (MD), Surgery; Thoracic and Cardiac Surgery  [address] [place] [address]  Phone: [phone]  Fax: [phone]    Oswaldo Kam (MD), Surgery; Thoracic Surgery  [address] [address]  Phone: [phone]  Fax: [phone]

## 2018-07-18 NOTE — DISCHARGE NOTE ADULT - PLAN OF CARE
Successful CABGx3 with Aorto-Right subclavian bypass. S/P CABGx3 Continue strict NPO w TF at goal per PEG -Dr. Boyd will follow-up with you in 1-2 weeks.  His office will call you with an appointment.  The office is located at [facility], [facility], 4th floor. Call us with any questions #[phone].    -Dr. Kam, Thoracic Surgery, will also follow-up with you in 1-2 weeks.  His office will contact you with an appointment date and time.  Office information is included in your discharge paperwork.     -Walk daily as tolerated and use your incentive spirometer every hour.    -No driving or strenuous activity/exercise for 6 weeks, or until  cleared by your surgeon.    -Gently clean your incisions with anti-bacterial soap and water, pat  dry.  You may leave them open to air.    -Call your doctor if you have shortness of breath, chest pain not  relieved by pain medication, dizziness, fever >101.5, or increased  redness or drainage from incisions. Refer to above instructions and the discharge manual provided to you prior to discharge.

## 2018-07-18 NOTE — DISCHARGE NOTE ADULT - INSTRUCTIONS
Strict NPO. Glucerna 1.5 @ 60cc/hr ATC via PEG.    Regular insulin sliding scale q 6hours  Lantus at bedtime per Med Rec list.

## 2018-07-18 NOTE — PROGRESS NOTE ADULT - SUBJECTIVE AND OBJECTIVE BOX
INTERVAL HPI/OVERNIGHT EVENTS:    POD#8 CABG x 3 ; aorto-Rt subclavian bypass; stapling/dividing Rt subclavian  EF 65%    64yo male with Hx HTN, HLD, DM, CVA w/residual L hemiparesis and dysphagia/PEG - failed swallow studies  Esophageal Motility Study 1/2/18: achalasia (Type III pattern) s/p EDG w/esophageal POEM 2/15/18 - hypertensive esophagus s/p perioral endoscopic myotomy. 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   CTa Neck and CTa coronaries 6/2018 - aberrant right subclavian artery with origin from the proximal arch, the course is posterior to the esophogram, the esophagus is dilated proximal to this.     to OR 7/10  extubated 7/11 7/12 - failed swallow eval and failed barium esophagram 7/16    patient up and ambulating with staff today - no acute events    PMHx includes but is not limited to:   CAD  Dysphagia  HLD   Stroke  Diabetes  HTN   H/O hernia repair: At age 6 months.  S/P cataract surgery  S/P Botox injection: to UES    ICU Vital Signs Last 24 Hrs  T(C): 36.2 (18 Jul 2018 13:41), Max: 36.7 (17 Jul 2018 17:04)  T(F): 97.2 (18 Jul 2018 13:41), Max: 98.1 (17 Jul 2018 17:04)  HR: 65 (18 Jul 2018 15:00) (52 - 66) sinus  BP: 128/64 (18 Jul 2018 15:00) (90/45 - 159/66)  BP(mean): 93 (18 Jul 2018 15:00) (59 - 101)  SpO2: 95% (18 Jul 2018 15:00) (92% - 100%) on 1L NC    Qtts: None    I&O's Summary    17 Jul 2018 07:01  -  18 Jul 2018 07:00  --------------------------------------------------------  IN: 1440 mL / OUT: 795 mL / NET: 645 mL    18 Jul 2018 07:01  -  18 Jul 2018 16:21  --------------------------------------------------------  IN: 480 mL / OUT: 100 mL / NET: 380 mL    Physical Exam    Heart - regular (-)rub/gallop  Lungs - CTA anterior - dec BS bases - no wheeze  Abd - (+)BS soft (-)r/r/g  Ext - warm to touch - no cyanosis/clubbing  Neuro - alert/oriented and interactive   Skin - no rash    LABS:                        10.0   13.6  )-----------( 338      ( 17 Jul 2018 03:20 )             32.5     07-17    143  |  96  |  36<H>  ----------------------------<  221<H>  4.6   |  42<H>  |  0.70    Ca    8.5      17 Jul 2018 03:20  Phos  3.7     07-16  Mg     2.0     07-17    TPro  6.2  /  Alb  3.5  /  TBili  1.2  /  DBili  x   /  AST  15  /  ALT  12  /  AlkPhos  50  07-16    PT/INR - ( 17 Jul 2018 03:20 )   PT: 13.3 sec;   INR: 1.19     PTT - ( 17 Jul 2018 03:20 )  PTT:30.1 sec    RADIOLOGY & ADDITIONAL STUDIES: reviewed    Patient now POD#8 CABG x 3 ; aorto-Rt subclavian bypass; stapling/dividing Rt subclavian with persistent swallow issues and high aspiration risk    1. CV  hemodynamically stable  ASA./amiodarone/norvasc/lasix/metoprolol/statin  titrate as clinical scenario allows    2. Pulm   incentive spirometry and ongoing ambulation    3. Endocrine  maintain glycemic control - lantus/ISS      4. GI  remains high aspiration risk - failed swallow assessments  NPO  to cont to strengthen and reassess in future    bowel regimen  DVT and GI prophylaxis    awaiting d/c once authorization and arrangements made  d/w patient/family updated and CTS

## 2018-07-23 PROBLEM — Z86.73 HISTORY OF STROKE: Status: RESOLVED | Noted: 2017-12-07 | Resolved: 2018-07-23

## 2018-07-23 PROBLEM — Z78.9 ALCOHOL USE: Status: ACTIVE | Noted: 2017-09-12

## 2018-07-24 PROBLEM — R13.10 DYSPHAGIA, UNSPECIFIED: Chronic | Status: ACTIVE | Noted: 2018-07-09

## 2018-07-24 PROBLEM — I25.10 ATHEROSCLEROTIC HEART DISEASE OF NATIVE CORONARY ARTERY WITHOUT ANGINA PECTORIS: Chronic | Status: ACTIVE | Noted: 2018-07-09

## 2018-07-24 PROBLEM — E78.5 HYPERLIPIDEMIA, UNSPECIFIED: Chronic | Status: ACTIVE | Noted: 2018-01-19

## 2018-07-26 PROBLEM — K22.0 ACHALASIA: Status: ACTIVE | Noted: 2018-01-18

## 2018-07-26 PROBLEM — Q27.8 ABERRANT SUBCLAVIAN ARTERY: Status: ACTIVE | Noted: 2018-07-09

## 2018-07-26 PROBLEM — Z09 POSTOP CHECK: Status: ACTIVE | Noted: 2018-07-26

## 2018-07-26 PROBLEM — Z95.1 S/P CABG X 3: Status: ACTIVE | Noted: 2018-07-26

## 2018-07-26 RX ORDER — METOPROLOL TARTRATE 25 MG/1
25 TABLET, FILM COATED ORAL
Qty: 30 | Refills: 3 | Status: ACTIVE | COMMUNITY

## 2018-07-26 RX ORDER — SERTRALINE HYDROCHLORIDE 50 MG/1
50 TABLET, FILM COATED ORAL DAILY
Refills: 0 | Status: ACTIVE | COMMUNITY

## 2018-07-26 RX ORDER — FUROSEMIDE 40 MG/1
40 TABLET ORAL DAILY
Qty: 10 | Refills: 0 | Status: ACTIVE | COMMUNITY

## 2018-07-26 RX ORDER — OLANZAPINE 2.5 MG/1
2.5 TABLET, FILM COATED ORAL DAILY
Refills: 0 | Status: ACTIVE | COMMUNITY

## 2018-07-26 RX ORDER — MIRTAZAPINE 7.5 MG/1
7.5 TABLET, FILM COATED ORAL
Refills: 0 | Status: ACTIVE | COMMUNITY
Start: 2018-07-26

## 2018-07-26 RX ORDER — INSULIN GLARGINE 100 [IU]/ML
100 INJECTION, SOLUTION SUBCUTANEOUS
Qty: 1 | Refills: 3 | Status: COMPLETED | COMMUNITY
Start: 2018-03-05 | End: 2018-07-26

## 2018-07-26 RX ORDER — AMLODIPINE BESYLATE 5 MG/1
5 TABLET ORAL DAILY
Qty: 30 | Refills: 5 | Status: ACTIVE | COMMUNITY

## 2018-07-26 RX ORDER — ACETAMINOPHEN 325 MG/1
325 TABLET ORAL EVERY 6 HOURS
Qty: 120 | Refills: 0 | Status: ACTIVE | COMMUNITY

## 2018-07-26 RX ORDER — AMIODARONE HYDROCHLORIDE 200 MG/1
200 TABLET ORAL DAILY
Qty: 30 | Refills: 0 | Status: ACTIVE | COMMUNITY

## 2018-07-26 RX ORDER — ASPIRIN ENTERIC COATED TABLETS 81 MG 81 MG/1
81 TABLET, DELAYED RELEASE ORAL
Qty: 30 | Refills: 0 | Status: COMPLETED | COMMUNITY
Start: 2018-04-24 | End: 2018-07-26

## 2018-07-26 RX ORDER — OMEPRAZOLE 40 MG/1
40 CAPSULE, DELAYED RELEASE ORAL TWICE DAILY
Qty: 60 | Refills: 5 | Status: COMPLETED | COMMUNITY
Start: 2018-02-15 | End: 2018-07-26

## 2018-07-30 DIAGNOSIS — R13.10 DYSPHAGIA, UNSPECIFIED: ICD-10-CM

## 2018-07-30 DIAGNOSIS — I69.351 HEMIPLEGIA AND HEMIPARESIS FOLLOWING CEREBRAL INFARCTION AFFECTING RIGHT DOMINANT SIDE: ICD-10-CM

## 2018-07-30 DIAGNOSIS — Z79.82 LONG TERM (CURRENT) USE OF ASPIRIN: ICD-10-CM

## 2018-07-30 DIAGNOSIS — Q25.8: ICD-10-CM

## 2018-07-30 DIAGNOSIS — I25.10 ATHEROSCLEROTIC HEART DISEASE OF NATIVE CORONARY ARTERY WITHOUT ANGINA PECTORIS: ICD-10-CM

## 2018-07-30 DIAGNOSIS — E78.5 HYPERLIPIDEMIA, UNSPECIFIED: ICD-10-CM

## 2018-07-30 DIAGNOSIS — Z93.1 GASTROSTOMY STATUS: ICD-10-CM

## 2018-07-30 DIAGNOSIS — Z88.0 ALLERGY STATUS TO PENICILLIN: ICD-10-CM

## 2018-07-30 DIAGNOSIS — E11.9 TYPE 2 DIABETES MELLITUS WITHOUT COMPLICATIONS: ICD-10-CM

## 2018-07-30 DIAGNOSIS — Z98.890 OTHER SPECIFIED POSTPROCEDURAL STATES: ICD-10-CM

## 2018-07-30 DIAGNOSIS — E43 UNSPECIFIED SEVERE PROTEIN-CALORIE MALNUTRITION: ICD-10-CM

## 2018-07-30 DIAGNOSIS — I10 ESSENTIAL (PRIMARY) HYPERTENSION: ICD-10-CM

## 2018-07-31 ENCOUNTER — FORM ENCOUNTER (OUTPATIENT)
Age: 63
End: 2018-07-31

## 2018-08-01 ENCOUNTER — APPOINTMENT (OUTPATIENT)
Dept: CARDIOTHORACIC SURGERY | Facility: CLINIC | Age: 63
End: 2018-08-01

## 2018-08-01 ENCOUNTER — APPOINTMENT (OUTPATIENT)
Dept: RADIOLOGY | Facility: HOSPITAL | Age: 63
End: 2018-08-01

## 2018-08-01 ENCOUNTER — APPOINTMENT (OUTPATIENT)
Dept: THORACIC SURGERY | Facility: CLINIC | Age: 63
End: 2018-08-01
Payer: MEDICAID

## 2018-08-01 ENCOUNTER — OUTPATIENT (OUTPATIENT)
Dept: OUTPATIENT SERVICES | Facility: HOSPITAL | Age: 63
LOS: 1 days | End: 2018-08-01
Payer: MEDICAID

## 2018-08-01 VITALS
WEIGHT: 137 LBS | SYSTOLIC BLOOD PRESSURE: 107 MMHG | HEART RATE: 71 BPM | TEMPERATURE: 97.2 F | OXYGEN SATURATION: 97 % | DIASTOLIC BLOOD PRESSURE: 55 MMHG | RESPIRATION RATE: 18 BRPM | BODY MASS INDEX: 22.11 KG/M2

## 2018-08-01 DIAGNOSIS — Z98.890 OTHER SPECIFIED POSTPROCEDURAL STATES: Chronic | ICD-10-CM

## 2018-08-01 DIAGNOSIS — Z95.1 PRESENCE OF AORTOCORONARY BYPASS GRAFT: ICD-10-CM

## 2018-08-01 DIAGNOSIS — Z09 ENCOUNTER FOR FOLLOW-UP EXAMINATION AFTER COMPLETED TREATMENT FOR CONDITIONS OTHER THAN MALIGNANT NEOPLASM: Chronic | ICD-10-CM

## 2018-08-01 DIAGNOSIS — Z09 ENCOUNTER FOR FOLLOW-UP EXAMINATION AFTER COMPLETED TREATMENT FOR CONDITIONS OTHER THAN MALIGNANT NEOPLASM: ICD-10-CM

## 2018-08-01 DIAGNOSIS — R13.10 DYSPHAGIA, UNSPECIFIED: ICD-10-CM

## 2018-08-01 DIAGNOSIS — Z98.49 CATARACT EXTRACTION STATUS, UNSPECIFIED EYE: Chronic | ICD-10-CM

## 2018-08-01 DIAGNOSIS — K22.0 ACHALASIA OF CARDIA: ICD-10-CM

## 2018-08-01 DIAGNOSIS — Q27.8 OTHER SPECIFIED CONGENITAL MALFORMATIONS OF PERIPHERAL VASCULAR SYSTEM: ICD-10-CM

## 2018-08-01 PROCEDURE — 74230 X-RAY XM SWLNG FUNCJ C+: CPT | Mod: 26

## 2018-08-01 PROCEDURE — 71046 X-RAY EXAM CHEST 2 VIEWS: CPT | Mod: 26

## 2018-08-01 PROCEDURE — 71046 X-RAY EXAM CHEST 2 VIEWS: CPT

## 2018-08-01 PROCEDURE — 99024 POSTOP FOLLOW-UP VISIT: CPT

## 2018-08-01 PROCEDURE — G8996: CPT | Mod: CN

## 2018-08-01 PROCEDURE — 92611 MOTION FLUOROSCOPY/SWALLOW: CPT | Mod: GN

## 2018-08-01 PROCEDURE — 74230 X-RAY XM SWLNG FUNCJ C+: CPT

## 2018-08-01 PROCEDURE — G8997: CPT | Mod: CI

## 2018-08-02 ENCOUNTER — EMERGENCY (EMERGENCY)
Facility: HOSPITAL | Age: 63
LOS: 1 days | Discharge: HOME | End: 2018-08-02
Attending: EMERGENCY MEDICINE | Admitting: EMERGENCY MEDICINE
Payer: MEDICAID

## 2018-08-02 VITALS
DIASTOLIC BLOOD PRESSURE: 80 MMHG | TEMPERATURE: 98 F | HEART RATE: 77 BPM | RESPIRATION RATE: 18 BRPM | SYSTOLIC BLOOD PRESSURE: 125 MMHG | OXYGEN SATURATION: 93 %

## 2018-08-02 DIAGNOSIS — M79.89 OTHER SPECIFIED SOFT TISSUE DISORDERS: ICD-10-CM

## 2018-08-02 DIAGNOSIS — Z09 ENCOUNTER FOR FOLLOW-UP EXAMINATION AFTER COMPLETED TREATMENT FOR CONDITIONS OTHER THAN MALIGNANT NEOPLASM: Chronic | ICD-10-CM

## 2018-08-02 DIAGNOSIS — Z79.82 LONG TERM (CURRENT) USE OF ASPIRIN: ICD-10-CM

## 2018-08-02 DIAGNOSIS — E11.9 TYPE 2 DIABETES MELLITUS WITHOUT COMPLICATIONS: ICD-10-CM

## 2018-08-02 DIAGNOSIS — E78.5 HYPERLIPIDEMIA, UNSPECIFIED: ICD-10-CM

## 2018-08-02 DIAGNOSIS — I25.10 ATHEROSCLEROTIC HEART DISEASE OF NATIVE CORONARY ARTERY WITHOUT ANGINA PECTORIS: ICD-10-CM

## 2018-08-02 DIAGNOSIS — I82.412 ACUTE EMBOLISM AND THROMBOSIS OF LEFT FEMORAL VEIN: ICD-10-CM

## 2018-08-02 DIAGNOSIS — Z95.1 PRESENCE OF AORTOCORONARY BYPASS GRAFT: Chronic | ICD-10-CM

## 2018-08-02 DIAGNOSIS — Z98.49 CATARACT EXTRACTION STATUS, UNSPECIFIED EYE: Chronic | ICD-10-CM

## 2018-08-02 DIAGNOSIS — L97.329 NON-PRESSURE CHRONIC ULCER OF LEFT ANKLE WITH UNSPECIFIED SEVERITY: ICD-10-CM

## 2018-08-02 DIAGNOSIS — E78.00 PURE HYPERCHOLESTEROLEMIA, UNSPECIFIED: ICD-10-CM

## 2018-08-02 DIAGNOSIS — Z98.890 OTHER SPECIFIED POSTPROCEDURAL STATES: Chronic | ICD-10-CM

## 2018-08-02 DIAGNOSIS — Z79.899 OTHER LONG TERM (CURRENT) DRUG THERAPY: ICD-10-CM

## 2018-08-02 DIAGNOSIS — Z88.0 ALLERGY STATUS TO PENICILLIN: ICD-10-CM

## 2018-08-02 DIAGNOSIS — I10 ESSENTIAL (PRIMARY) HYPERTENSION: ICD-10-CM

## 2018-08-02 LAB
ALBUMIN SERPL ELPH-MCNC: 3.4 G/DL — SIGNIFICANT CHANGE UP (ref 3.3–5)
ALP SERPL-CCNC: 76 U/L — SIGNIFICANT CHANGE UP (ref 40–120)
ALT FLD-CCNC: 24 U/L — SIGNIFICANT CHANGE UP (ref 10–45)
ANION GAP SERPL CALC-SCNC: 8 MMOL/L — SIGNIFICANT CHANGE UP (ref 5–17)
APTT BLD: 31.9 SEC — SIGNIFICANT CHANGE UP (ref 27.5–37.4)
AST SERPL-CCNC: 31 U/L — SIGNIFICANT CHANGE UP (ref 10–40)
BASOPHILS NFR BLD AUTO: 0.5 % — SIGNIFICANT CHANGE UP (ref 0–2)
BILIRUB SERPL-MCNC: 0.7 MG/DL — SIGNIFICANT CHANGE UP (ref 0.2–1.2)
BUN SERPL-MCNC: 25 MG/DL — HIGH (ref 7–23)
CALCIUM SERPL-MCNC: 8.4 MG/DL — SIGNIFICANT CHANGE UP (ref 8.4–10.5)
CHLORIDE SERPL-SCNC: 92 MMOL/L — LOW (ref 96–108)
CO2 SERPL-SCNC: 38 MMOL/L — HIGH (ref 22–31)
CREAT SERPL-MCNC: 0.65 MG/DL — SIGNIFICANT CHANGE UP (ref 0.5–1.3)
EOSINOPHIL NFR BLD AUTO: 1.4 % — SIGNIFICANT CHANGE UP (ref 0–6)
EXTRA SST TUBE: SIGNIFICANT CHANGE UP
GLUCOSE SERPL-MCNC: 204 MG/DL — HIGH (ref 70–99)
HCT VFR BLD CALC: 34.3 % — LOW (ref 39–50)
HGB BLD-MCNC: 10.7 G/DL — LOW (ref 13–17)
INR BLD: 1.13 — SIGNIFICANT CHANGE UP (ref 0.88–1.16)
LYMPHOCYTES # BLD AUTO: 14.3 % — SIGNIFICANT CHANGE UP (ref 13–44)
MCHC RBC-ENTMCNC: 31.2 G/DL — LOW (ref 32–36)
MCHC RBC-ENTMCNC: 31.4 PG — SIGNIFICANT CHANGE UP (ref 27–34)
MCV RBC AUTO: 100.6 FL — HIGH (ref 80–100)
MONOCYTES NFR BLD AUTO: 7.6 % — SIGNIFICANT CHANGE UP (ref 2–14)
NEUTROPHILS NFR BLD AUTO: 76.2 % — SIGNIFICANT CHANGE UP (ref 43–77)
NT-PROBNP SERPL-SCNC: 497 PG/ML — HIGH (ref 0–300)
PLATELET # BLD AUTO: 311 K/UL — SIGNIFICANT CHANGE UP (ref 150–400)
POTASSIUM SERPL-MCNC: 4.5 MMOL/L — SIGNIFICANT CHANGE UP (ref 3.5–5.3)
POTASSIUM SERPL-SCNC: 4.5 MMOL/L — SIGNIFICANT CHANGE UP (ref 3.5–5.3)
PROT SERPL-MCNC: 5.7 G/DL — LOW (ref 6–8.3)
PROTHROM AB SERPL-ACNC: 12.6 SEC — SIGNIFICANT CHANGE UP (ref 9.8–12.7)
RBC # BLD: 3.41 M/UL — LOW (ref 4.2–5.8)
RBC # FLD: 19.7 % — HIGH (ref 10.3–16.9)
SODIUM SERPL-SCNC: 138 MMOL/L — SIGNIFICANT CHANGE UP (ref 135–145)
WBC # BLD: 7.3 K/UL — SIGNIFICANT CHANGE UP (ref 3.8–10.5)
WBC # FLD AUTO: 7.3 K/UL — SIGNIFICANT CHANGE UP (ref 3.8–10.5)

## 2018-08-02 PROCEDURE — 85610 PROTHROMBIN TIME: CPT

## 2018-08-02 PROCEDURE — 80053 COMPREHEN METABOLIC PANEL: CPT

## 2018-08-02 PROCEDURE — 85025 COMPLETE CBC W/AUTO DIFF WBC: CPT

## 2018-08-02 PROCEDURE — 99284 EMERGENCY DEPT VISIT MOD MDM: CPT | Mod: GC

## 2018-08-02 PROCEDURE — 93971 EXTREMITY STUDY: CPT

## 2018-08-02 PROCEDURE — 85730 THROMBOPLASTIN TIME PARTIAL: CPT

## 2018-08-02 PROCEDURE — 99284 EMERGENCY DEPT VISIT MOD MDM: CPT | Mod: 25

## 2018-08-02 PROCEDURE — 36415 COLL VENOUS BLD VENIPUNCTURE: CPT

## 2018-08-02 PROCEDURE — 83880 ASSAY OF NATRIURETIC PEPTIDE: CPT

## 2018-08-02 PROCEDURE — 93971 EXTREMITY STUDY: CPT | Mod: 26,LT

## 2018-08-02 NOTE — ED PROVIDER NOTE - CARE PLAN
Principal Discharge DX:	Chronic deep vein thrombosis (DVT) of femoral vein of left lower extremity  Secondary Diagnosis:	Peripheral edema  Secondary Diagnosis:	Hematoma

## 2018-08-02 NOTE — ED PROVIDER NOTE - OBJECTIVE STATEMENT
63M hx CAD, htn, dm, stroke, high chol, s/p recent CABG, sent in from rehab for LLE. pt had vein stripped from LLE.  +b/l lower extremity swelling.  no leg pain. +purple coloration to L foot with ulcer to ankle.  no fevers. no vomiting, no chest pain, no SOB.

## 2018-08-02 NOTE — ED PROVIDER NOTE - MEDICAL DECISION MAKING DETAILS
s/p recent CABG, violaceous patches to LLE, small ulcer and skin avulsion - no evidence of cellulitis, warm, pitting edema b/l  CT surg and vasc aware, check labs

## 2018-08-02 NOTE — ED ADULT NURSE NOTE - NSIMPLEMENTINTERV_GEN_ALL_ED
Implemented All Fall Risk Interventions:  [place] to call system. Call bell, personal items and telephone within reach. Instruct patient to call for assistance. Room bathroom lighting operational. Non-slip footwear when patient is off stretcher. Physically safe environment: no spills, clutter or unnecessary equipment. Stretcher in lowest position, wheels locked, appropriate side rails in place. Provide visual cue, wrist band, yellow gown, etc. Monitor gait and stability. Monitor for mental status changes and reorient to person, place, and time. Review medications for side effects contributing to fall risk. Reinforce activity limits and safety measures with patient and family.

## 2018-08-02 NOTE — CHART NOTE - NSCHARTNOTEFT_GEN_A_CORE
Patient transferred from [facility] to [facility] ED for evaluation of LE edema and LLE ulcers. Patient reports no complaints and says that his legs have been swollen for weeks and the swelling present is an improvement from last week. He also states that the ecchymosis present on his Left foot has been present for weeks and has not worsened.     LLE with patchy ecchymosis along calf, foot and lateral malleolus. 1cm ulcer to Lateral Malleolus, no drainage. Palpable DP and PT pulses 1+.     LE duplex revealed non occlusive thrombus in mid femoral vein and 7x2cm hematoma in L groin.     Evaluated by vascular and cleared for discharge with outpatient follow up with Dr. Andujar this Tuesday 8/7/18. Per vascular, images from LE duplex reviewed and thrombus/hematoma appears chronic with good flow distal and proximal.     Discussed with Dr. Kam and patient cleared for discharge back to [facility] and will arrange follow up with Dr. Kam / Dr. Boyd the same day as Dr. Andujar.

## 2018-08-02 NOTE — ED ADULT NURSE NOTE - PSH
H/O hernia repair  At age 6 months.  S/P Botox injection  to UES  S/P CABG (coronary artery bypass graft)    S/P cataract surgery

## 2018-08-02 NOTE — ED PROVIDER NOTE - PHYSICAL EXAMINATION
LLE - scattered violaceous patches, shallow 0.5cm ulceration to lateral mall, small area of skin slough to lateral foot, trace dp, sensation intact

## 2018-08-02 NOTE — CONSULT NOTE ADULT - ASSESSMENT
A: 62Y.o Male with Chronic LLE DVT & Hematoma to L Groin    -Recommend Elevation Compression  -Cont MetaHoney dressing to ulcer on L Lateral Malleolus  -F/u with Dr Andujar on Tues 8/7 for repeat Duplex  - No Anticoagulation necessary at this time A: 62Y.o Male with Chronic LLE DVT & Hematoma to L Groin    -Recommend Elevation Compression  -Cont MediHoney dressing to ulcer on L Lateral Malleolus  -F/u with Dr Andujar on Tues 8/7 for repeat Duplex  - No Anticoagulation necessary at this time      Vascular surgery chief resident addendum:  Generally agree with above. I examined the patient in the emergency room at 0345 AM this morning. He presents with feeling left leg is more swollen than right, but on exam, they have equal 2+ pitting edema. Pedal pulses are palpable. He has an open but clean wound on left lateral malleolus, to which he has been applying MediHoney. Left leg duplex today shows chronic non-occlusive left femoral vein DVT.  - for chronic DVT, no anticoagulation indicated  - continue to apply MediHoney to left lateral malleolar wound, followed by kerlix and ACE wrap to knee; change dressing daily  - elevate the leg above the heart as much as possible when resting  - Follow-up with Dr. Anoop Andujar () this Tuesday (August 7, 2018) for appointment and repeat left leg venous duplex to re-assess chronic DVT and left groin hematoma  - plan discussed with CT surgery team, who is in agreement  - thank you for the consult

## 2018-08-02 NOTE — ED PROVIDER NOTE - PROGRESS NOTE DETAILS
pt seen by vasc - great pulses with doppler, no concern regarding arterial flow. recommend duplex likely chronic DVT, also with groin hematoma.  vascular recommends no anticoagulation at this time. pt to return to clinic Tuesday 8/7 with Dr. Andujar for repeat duplex.  I have discussed the discharge plan with the patient. The patient agrees with the plan, as discussed.  The patient understands Emergency Department diagnosis is a preliminary diagnosis often based on limited information and that the patient must adhere to the follow-up plan as discussed.  The patient understands that if the symptoms worsen the patient may return to the Emergency Department at any time for further evaluation and treatment.

## 2018-08-02 NOTE — ED ADULT NURSE NOTE - OBJECTIVE STATEMENT
Per pt and spouse "He had cardiac bypass 3 weeks ago and the wounds on his leg is not healing as it should and his legs are very swollen and painful". Pt noted with b/l swelling to lower legs and wound to left lower leg with dressing in place. Pt also noted with sacral ulcer. Per pt and spouse "He had cardiac bypass 3 weeks ago and the wounds on his leg is not healing as it should and his legs are very swollen and painful". Pt noted with b/l swelling to lower legs and wound to left lower leg with dressing in place. Pt also noted with stage 2 sacral ulcer. Per pt and spouse "He had cardiac bypass 3 weeks ago and the wounds on his leg is not healing as it should and his legs are very swollen and painful". Pt noted with b/l swelling to lower legs and wound to left lower leg with dressing in place. Pt also noted with stage 2 sacral ulcer. Pt also have peg tube in place.

## 2018-08-02 NOTE — CONSULT NOTE ADULT - SUBJECTIVE AND OBJECTIVE BOX
Vascular Attending:    Con    HPI: Pt is 63y.o male with Pmhx of CAD, HTN, DM, CVA & HLD. Pt s/p Aorta to R subclavian artery bypass & Ligation of proximal aberrant right subclavian artery.  Pt went for f/u in Dr Kam's officice today and NP found pt's legs to cool, edematous with non palpable pulses. Pt denies pain to LE but is more concerned about new bruising to LLE.      PAST MEDICAL & SURGICAL HISTORY:  Coronary artery disease  Dysphagia  HLD (hyperlipidemia)  Stroke  Diabetes  HTN (hypertension)  S/P CABG (coronary artery bypass graft)  H/O hernia repair: At age 6 months.  S/P cataract surgery  S/P Botox injection: to UES      REVIEW OF SYSTEMS  Reviewed and Neg except as in HPI      Allergies  penicillin (Unknown)  penicillins (Unknown)    SOCIAL HISTORY:    FAMILY HISTORY:  Family history of breast cancer (Sibling)  Family history of malignant neoplasm of ovary (Mother)  Family history of pancreatic cancer (Father)      Vital Signs Last 24 Hrs  T(C): 36.4 (02 Aug 2018 00:21), Max: 36.4 (02 Aug 2018 00:21)  T(F): 97.6 (02 Aug 2018 00:21), Max: 97.6 (02 Aug 2018 00:21)  HR: 77 (02 Aug 2018 00:21) (77 - 77)  BP: 125/80 (02 Aug 2018 00:21) (125/80 - 125/80)  BP(mean): --  RR: 18 (02 Aug 2018 00:21) (18 - 18)  SpO2: 93% (02 Aug 2018 00:21) (93% - 93%)    PHYSICAL EXAM:    Constitutional:  Pt AXOX3 IN NAD  Eyes: PERRL  Respiratory: non labored   Cardiovascular: S1S2  Extremities: B/L LE Edema, ecchymosis surrounding ankle, plantar service, and below knee, 1cm ulcer to Lateral Malleolus,   Vascular: Triphasic DP, PT, Pop bilaterally, Palpable Fems  Neurological: Intact      LABS:                        10.7   7.3   )-----------( 311      ( 02 Aug 2018 00:47 )             34.3     08-02    138  |  92<L>  |  25<H>  ----------------------------<  204<H>  4.5   |  38<H>  |  0.65    Ca    8.4      02 Aug 2018 00:46    TPro  5.7<L>  /  Alb  3.4  /  TBili  0.7  /  DBili  x   /  AST  31  /  ALT  24  /  AlkPhos  76  08-02    PT/INR - ( 02 Aug 2018 00:47 )   PT: 12.6 sec;   INR: 1.13          PTT - ( 02 Aug 2018 00:47 )  PTT:31.9 sec      RADIOLOGY & ADDITIONAL STUDIES

## 2018-08-02 NOTE — ED ADULT NURSE NOTE - PMH
Coronary artery disease    Diabetes    Dysphagia    HLD (hyperlipidemia)    HTN (hypertension)    Stroke

## 2018-08-04 PROBLEM — R13.10 DYSPHAGIA: Status: ACTIVE | Noted: 2018-07-09

## 2018-08-07 ENCOUNTER — APPOINTMENT (OUTPATIENT)
Dept: VASCULAR SURGERY | Facility: CLINIC | Age: 63
End: 2018-08-07
Payer: MEDICAID

## 2018-08-07 DIAGNOSIS — L97.929 VARICOSE VEINS OF LEFT LOWER EXTREMITY WITH ULCER OF UNSPECIFIED SITE: ICD-10-CM

## 2018-08-07 DIAGNOSIS — R60.0 LOCALIZED EDEMA: ICD-10-CM

## 2018-08-07 DIAGNOSIS — I83.029 VARICOSE VEINS OF LEFT LOWER EXTREMITY WITH ULCER OF UNSPECIFIED SITE: ICD-10-CM

## 2018-08-07 PROCEDURE — 29580 STRAPPING UNNA BOOT: CPT | Mod: LT

## 2018-08-07 PROCEDURE — 99213 OFFICE O/P EST LOW 20 MIN: CPT | Mod: 25

## 2018-08-14 ENCOUNTER — APPOINTMENT (OUTPATIENT)
Dept: VASCULAR SURGERY | Facility: CLINIC | Age: 63
End: 2018-08-14

## 2018-08-23 ENCOUNTER — APPOINTMENT (OUTPATIENT)
Dept: RADIOLOGY | Facility: HOSPITAL | Age: 63
End: 2018-08-23

## 2018-08-23 ENCOUNTER — APPOINTMENT (OUTPATIENT)
Dept: CT IMAGING | Facility: HOSPITAL | Age: 63
End: 2018-08-23

## 2018-08-30 ENCOUNTER — APPOINTMENT (OUTPATIENT)
Dept: THORACIC SURGERY | Facility: CLINIC | Age: 63
End: 2018-08-30

## 2018-09-04 ENCOUNTER — APPOINTMENT (OUTPATIENT)
Dept: VASCULAR SURGERY | Facility: CLINIC | Age: 63
End: 2018-09-04

## 2018-09-06 ENCOUNTER — APPOINTMENT (OUTPATIENT)
Dept: RADIOLOGY | Facility: HOSPITAL | Age: 63
End: 2018-09-06

## 2018-09-07 NOTE — ED ADULT TRIAGE NOTE - ESI TRIAGE ACUITY LEVEL, MLM
CHIEF COMPLAINT:   follow-up pediatric UTI.    HPI:    Allison Alfredo is an 4 year old female who presents for follow-up of pediatric UTI. Patient was seen approximately 2 weeks ago for fever and dysuria. History and exam as well as her urinalysis were consistent with a urinary tract infection for which she was prescribed amoxicillin for 10 days, which she tolerated well.    Parents state that she is back to her usual self. They have noted no reports of any fever, dysuria, abdominal pain, or any other symptoms suggestive of residual UTI. Her p.o. intake has been normal, and her appetite is back to her baseline.    They have no other additional concerns at this time.    REVIEW OF SYSTEMS:    Comprehensive review of systems was reviewed and discussed with the patient, and was otherwise negative, except as noted in the history of present illness, above.    PAST MEDICAL, SURGICAL, MEDICATION, ALLERGY, & SOCIAL HISTORIES:    I have reviewed the past medical history, family history, social history, medications and allergies listed in the medical record as obtained by my nursing staff and support staff and agree with their documentation.    Past Medical History:   Diagnosis Date   • PAST MEDICAL HISTORY     unremarkable     Past Surgical History:   Procedure Laterality Date   • Past surgical history      none     Current Outpatient Prescriptions   Medication Sig Dispense Refill   • IBUPROFEN PO      • Acetaminophen (TYLENOL PO)      • polyethylene glycol (MIRALAX) powder Take 17 g by mouth daily. 527 g 2   • Pediatric Multiple Vit-C-FA (FLINSTONES GUMMIES OMEGA-3 DHA) Chew Tab Chew 1 tablet by mouth daily.     • DM-APAP-CPM (CHILDRENS COUGH/RUNNY NOSE) 5-160-1 MG/5ML Suspension Take 5 mLs by mouth 4 times daily as needed (congestion). 1 Bottle 3     No current facility-administered medications for this visit.      ALLERGIES:  No Known Allergies     Social History     Social History   • Marital status: Single     Spouse  name: N/A   • Number of children: N/A   • Years of education: N/A     Social History Main Topics   • Smoking status: Not on file   • Smokeless tobacco: Not on file   • Alcohol use Not on file   • Drug use: Unknown   • Sexual activity: Not on file     Other Topics Concern   • Not on file     Social History Narrative   • No narrative on file     Family History   Problem Relation Age of Onset   • Allergies Mother    • Bleeding Disorder Mother    • Allergies Father    • Heart disease Father    • High cholesterol Father    • Hypertension Father    • Cancer Maternal Grandmother         pancreatic   • Stroke Maternal Grandmother    • Thyroid Maternal Grandmother    • Cancer, Lung Maternal Grandfather    • Cancer, Lung Paternal Grandmother    • High blood pressure Paternal Grandfather    • Thyroid Paternal Grandfather    • Diabetes Other      Immunization History   Administered Date(s) Administered   • BCG 2014   • DTaP, 5 Pertussis Antigens (DAPTACEL) 11/15/2017   • DTaP/HIB/IPV 2014, 04/20/2015, 05/03/2015   • DTaP/IPV 04/25/2016   • HIB, Unspecified Formulation 09/25/2017   • Hep A, ped/adol, 2 dose 09/25/2017   • Hep B, adolescent or pediatric 2014, 2014, 04/20/2015   • Influenza, injectable, quadrivalent 11/15/2017, 01/24/2018   • Influenza, injectable, quadrivalent, preservative-free 02/15/2017   • MMR 11/09/2015   • Pneumococcal Conjugate 13 valent 01/19/2015, 03/05/2015, 11/09/2015, 02/15/2017   • Varicella 11/09/2015     Health Maintenance   Topic Date Due   • Hepatitis A Vaccine (2 of 2 - 2-dose series) 03/25/2018   • Influenza Vaccine (1) 09/01/2018   • IPV Vaccine (4 of 4 - All-IPV series) 10/14/2018   • MMR Vaccine (2 of 2 - Standard series) 10/14/2018   • Varicella Vaccine (2 of 2 - 2-dose childhood series) 10/14/2018   • DTaP/Tdap/Td Vaccine (5 - DTaP) 10/14/2018   • Meningococcal Vaccine (1 of 2 - 2-dose series) 10/14/2025   • Pneumococcal Vaccine  Completed   • Hepatitis B Vaccine   4 Completed   • HIB Vaccine  Completed   • Rotavirus Vaccine  Aged Out       OBJECTIVE:    Visit Vitals  Temp 97.9 °F (36.6 °C) (Temporal Artery)   Wt 14.3 kg     Physical Exam:    General:  Pleasant, alert, well-developed, well-nourished, pediatric white female in no acute distress, breathing comfortably, and well-hydrated.  HEENT:   Non-focal for acute signs of illness infection or injury. Mucous membranes are moist.  NECK:  Supple, full range of motion, no meningeal signs;  No cervical or supraclavicular lymphadenopathy.  Lungs:  Clear to auscultation bilaterally, without wheeze, rales, or rhonchi.  Cardiac:  Regular rate and rhythm, no murmurs.  Pulses intact and equal bilaterally.  ABDOMEN:  soft, non-distended, non-tender to palpation, no rigidity, no guarding  BACK:   No CVA tenderness.    SKIN:  Warm and dry, no rash or other concerning skin lesions seen.  EXTREMITIES:  Normal range of motion.    NEURO:  Interactive and engaging, she responds to the physical exam appropriately.    Labs/Studies:     Labs:  Urinalysis repeated and is pending at the time of dictation      ASSESSMENT:    1.  UTI, pediatric female.  Clinically resolved.      PLAN:    Meds:  No new meds  Diet:  Resume usual diet  Labs:  Urinalysis is pending to document resolution  Studies:  No new studies  Consults:  No consults  Other:  Continue good hydration, bubble bath hygiene discussed    Follow-up recommendation is made to see us back in clinic for recheck at 5 years old for well-child exam.  Patient is welcome to return sooner for worsening symptoms, intolerance of treatment, or any other concerns.    No orders of the defined types were placed in this encounter.

## 2018-09-12 ENCOUNTER — APPOINTMENT (OUTPATIENT)
Dept: NEUROLOGY | Facility: CLINIC | Age: 63
End: 2018-09-12

## 2019-05-28 NOTE — SWALLOW BEDSIDE ASSESSMENT ADULT - SWALLOW EVAL: ORAL MUSCULATURE
the appearance of the brain. No acute process. Continue Lovenox therapeutic for pilar PE. Continue ATB per ID team, on Zosyn until 5/30/2019. Systemic palliative chemo (Gemcitabine/Abraxane) for metastatic adenocarcinoma of pancreatic origin is recommended if his ECOG performance status allows. F/up with Dr. Aldo Simon after discharge from the hospital.  Will continue to follow.     Thank you for allowing us to participate in the care of Mr. Angela Sutton.     Sagar Nielsen MD   HEMATOLOGY/MEDICAL ONCOLOGY  [address] [place] [address] MED ONCOLOGY  [address] [place] [id number]  Dept: [phone] generally intact

## 2019-06-15 NOTE — DIETITIAN INITIAL EVALUATION ADULT. - PROBLEM SELECTOR PLAN 1
Reports weakness and dizziness Patient with FGS of 35 on EMS arrival given 1 amp of D50 and FGS upon arrival was 89 on admission; 1 hour later it was 50 and another 1 amp of D50 was given and patient responded with FGS of 125--->>142 but then 102 for which he was started on a D5 + NS at 125 cc/hr; A1c is 6.1 however patient has been on lantus 26 units QD; patient also endorses that he has lost 90 pounds since the stroke; Hypoglycemia may be iatrogenic from insulin in the setting of an infection (influenza) and recent weight loss   -- will change to D10+ NS drip at 125 cc/hr  --FGS Q6   -- endo consult in the am to determine an adequate regiment upon discharge  -- neuro check Q6 Patient states no history

## 2020-04-01 NOTE — ED ADULT NURSE NOTE - CINV DISCH TEACH PARTICIP
Please schedule the following with:     Date:   20 @      Account: [de-identified]  Patient: Brodie Oscar    : 1954  Address:  [address] [facility]    Phone (H):  [phone] (home)      ----------------------------------------------------------------------------------------------  Diagnosis:     ICD-10-CM    1. Cervical radiculopathy M54.12    2. Foraminal stenosis of cervical region M48.02    3. Spondylosis without myelopathy or radiculopathy, cervical region M47.812          Levels: C6-7   Procedure type: Interlaminar Epidural Steroid Injectin  Side: Midline   CPT Codes 04107    ----------------------------------------------------------------------------------------------  Injection # 1   [facility]    Attending Physician       Mckenna Loyd MD.  ----------------------------------------------------------------------------------------------  Injection Scheduled For:    At:    [facility]  Pre-Cert#    [employer]     Pre-Cert#    Comments or Special instructions:    · Infection control  · Tested positive for MRSA in past 12 months:  no  · Tested positive for MSSA \"staph infection\" in past 12 months: no  · Tested positive for VRE (Vancomycin Resistant Enterococci) in past 12 months:   no  · Currently on any antibiotics for an infection: no  · Anticoagulants:  · On a blood thinner:  yes , ASA  · Any history of bleeding disorder: no   · Advanced Liver disease: no   · Advanced Renal disease: no   · Glaucoma: no   · Diabetes: yes     Sedation:  Yes  -----------------------------------------------------------------------------------------------  Allergies   Allergen Reactions    Naproxen Hives Patient

## 2021-03-02 NOTE — ED PROVIDER NOTE - NS ED ATTENDING STATEMENT MOD
hair removal not indicated I have personally performed a face to face diagnostic evaluation on this patient. I have reviewed the ACP note and agree with the history, exam and plan of care, except as noted.

## 2021-05-05 NOTE — ED ADULT NURSE NOTE - NS ED NOTE ABUSE RESPONSE YN
----- Message from Dyana Browning MD sent at 5/3/2021  1:19 PM EDT -----  Regarding: RE: pressure change  I reviewed the data from his machine and shows sleep disordered breathing is well controlled at 90th percentile pressure of 10 9 cm each H2O that is actually lower than the pressure he was getting when I saw him in clinic a few weeks ago  If he is continue difficulty tolerating the pressure, have him schedule a sleep clinic visit so I can evaluate further  ----- Message -----  From: Ginny Hernandez  Sent: 4/30/2021  11:35 AM EDT  To: Dyana Browning MD  Subject: pressure change                                  Hi Dr Saintclair Malling, this pt called me and said he feels like he pressure is high and is having trouble tolerating   I will have a compliance report scanned in Yes

## 2022-04-01 NOTE — H&P ADULT - NSHPPOAPULMEMBOLUS_GEN_A_CORE
This note was copied from a baby's chart.  Per report LC not to bother PT unless mom asked herself for help.PT's RN was going into the Mother's room and LC asked her to check with PT if she needs any help. PT denied.    no

## 2022-09-13 NOTE — PHYSICAL THERAPY INITIAL EVALUATION ADULT - GAIT PATTERN USED, PT EVAL
[FreeTextEntry1] : Alert, NAD. Heart sounds NL. Neck FROM. PERRL, EOMI, face symmetric, hearing intact. Tone, power, sensation, gait, DTRs NL. No nystagmus or tremor.  3-point gait

## 2023-02-09 NOTE — ED ADULT NURSE NOTE - [id number]_ED_A_ED
This writer contacted patient, two patient identifiers verified with patient. Patient informed of appointment information for Cardiologist as noted below. Patient verbalized understanding and gratitude. Patient denies any questions or concerns. Dr. Windy Wilks.   His office is located at [place], [place]    Office Phone Number [phone]  An appointment has been scheduled for March 8, 2023 at 11:00AM. Patient was advised to remember to take his photo ID and insurance card with you at the time of the appointment.  I No

## 2024-02-01 NOTE — ED PROVIDER NOTE - NS ED ATTENDING STATEMENT MOD
Quality 226: Preventive Care And Screening: Tobacco Use: Screening And Cessation Intervention: Patient screened for tobacco use and is an ex/non-smoker Quality 130: Documentation Of Current Medications In The Medical Record: Current Medications Documented Detail Level: Detailed Quality 431: Preventive Care And Screening: Unhealthy Alcohol Use - Screening: Patient not identified as an unhealthy alcohol user when screened for unhealthy alcohol use using a systematic screening method Quality 47: Advance Care Plan: Advance Care Planning discussed and documented; advance care plan or surrogate decision maker documented in the medical record. Attending Only

## 2024-02-22 NOTE — ED PROVIDER NOTE - NS ED ATTENDING NAME FT
Consultation - Pulmonary Medicine   Ganesh Bowden 44 y.o. male MRN: [id number]  Unit/Bed#: -01 Encounter: [id number]      Assessment and Plan:   1.  Acute exacerbation of bronchial asthma: He has acute exacerbation of bronchial asthma.  This is most likely precipitated by acute tracheobronchitis.  Currently he is doing better with oral prednisone and nebulized levalbuterol.  He has been started on azithromycin.  He is also on Arnuity Ellipta.  Monitor peak flows.  He did not have any significant eosinophilia.  His chest x-ray did not show any evidence of pneumonia.    I spoke to the patient answered all questions.  I have advised him to follow-up in the pulmonary office after discharge.    I spoke to the primary team.    Thank you for allowing me to participate in the care of the patient.    History of Present Illness     Physician Requesting Consult: Waldemar Jaramillo    Reason for Consult / Principal Problem: Asthma exacerbation.    Hx and PE limited by: None    HPI: Ganesh Bowden is a 44 y.o. year old male with past medical history of dyslipidemia  asthma, diverticulitis and traumatic left pneumothorax currently admitted with worsening shortness of breath since 2 to 3 weeks.  He has cough which is productive of clear phlegm.  He has wheezing.  He does not normally use any inhaler for his asthma.  He has been using inhaler/nebulizer but was still getting short of breath.  Denied any fever or chills.  No chest pain no palpitations.  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  He does not have any pets.  He does not smoke.  He has been started on systemic steroid and nebulized bronchodilator and he is already feeling much better.  He still gets occasional spasms of cough and wheezing.  His appetite is improving.    Inpatient consult to Pulmonology  Consult performed by: Jessica Torres MD  Consult ordered by: Waldemar Jaramillo          Review of Systems   Constitutional:  Negative for  appetite change, chills, fatigue and fever.   HENT:  Positive for rhinorrhea. Negative for hearing loss, sneezing, sore throat and trouble swallowing.    Eyes:  Negative for visual disturbance.   Respiratory:  Positive for cough, shortness of breath and wheezing. Negative for chest tightness.    Cardiovascular:  Negative for chest pain, palpitations and leg swelling.   Gastrointestinal:  Negative for abdominal pain, constipation, diarrhea, nausea and vomiting.   Genitourinary:  Negative for dysuria, frequency and urgency.   Musculoskeletal:  Negative for arthralgias and gait problem.   Skin:  Negative for rash.   Allergic/Immunologic: Positive for environmental allergies.   Neurological:  Negative for dizziness, syncope, light-headedness and headaches.   Psychiatric/Behavioral:  Negative for agitation, confusion and sleep disturbance. The patient is nervous/anxious.        Historical Information   Past Medical History:   Diagnosis Date    Asthma     9/30/21  Last attack was greater than one month ago; triggered by seasonal allergies    Diverticulitis     last attack of diverticulitis in January 2021    Pneumothorax      Past Surgical History:   Procedure Laterality Date    CHEST TUBE INSERTION      COLONOSCOPY       Social History   Social History     Substance and Sexual Activity   Alcohol Use Yes    Comment: occasionally-3 beers/week     Social History     Substance and Sexual Activity   Drug Use No     E-Cigarette/Vaping    E-Cigarette Use Never User      E-Cigarette/Vaping Substances    Nicotine No     THC No     CBD No     Flavoring No     Other No     Unknown No      Social History     Tobacco Use   Smoking Status Never   Smokeless Tobacco Never   Tobacco Comments    rarely--one every three months     Occupational History: Works in construction.  Admits to being exposed to dust.    Family History:  Father has asthma.    Meds/Allergies   all current active meds have been reviewed    No Known  "Allergies    Objective   Vitals: Blood pressure 131/80, pulse 99, temperature 98.6 °F (37 °C), temperature source Tympanic, resp. rate 20, height 5' 10\" (1.778 m), weight 72.6 kg (160 lb 2 oz), SpO2 92%.,Body mass index is 22.98 kg/m².    Intake/Output Summary (Last 24 hours) at 2/22/2024 1604  Last data filed at 2/22/2024 0210  Gross per 24 hour   Intake 50 ml   Output --   Net 50 ml     Invasive Devices       Peripheral Intravenous Line  Duration             Peripheral IV 02/22/24 Distal;Right;Upper;Ventral (anterior) Arm <1 day                    Physical Exam  Constitutional:       General: He is not in acute distress.     Appearance: He is not ill-appearing, toxic-appearing or diaphoretic.      Interventions: He is not intubated.  HENT:      Head: Normocephalic.      Mouth/Throat:      Mouth: Mucous membranes are moist.   Neck:      Vascular: No JVD.   Cardiovascular:      Rate and Rhythm: Normal rate.      Heart sounds: Normal heart sounds. No murmur heard.  Pulmonary:      Effort: Pulmonary effort is normal. No tachypnea, bradypnea, accessory muscle usage or respiratory distress. He is not intubated.      Breath sounds: No stridor. Examination of the right-middle field reveals rhonchi. Examination of the left-middle field reveals rhonchi. Examination of the right-lower field reveals rhonchi. Examination of the left-lower field reveals rhonchi. Rhonchi present. No decreased breath sounds, wheezing or rales.   Chest:      Chest wall: No tenderness.   Abdominal:      Tenderness: There is no abdominal tenderness. There is no guarding.   Musculoskeletal:      Right lower leg: No edema.      Left lower leg: No edema.   Lymphadenopathy:      Cervical: No cervical adenopathy.   Skin:     Coloration: Skin is not cyanotic or pale.      Findings: No rash.      Nails: There is no clubbing.   Neurological:      Mental Status: He is alert and oriented to person, place, and time.   Psychiatric:         Mood and Affect: Mood " normal. Mood is not anxious.         Behavior: Behavior normal. Behavior is not agitated.         Lab Results: I have personally reviewed pertinent lab results. sodium 136 potassium 3.5 bicarbonate 18 glucose 166 creatinine 0.86.  White cell count 10.83 hemoglobin 15.5 platelet 251.  Procalcitonin 0.05.  COVID RSV influenza negative.  Imaging Studies: I have personally reviewed pertinent reports.   and I have personally reviewed pertinent films in PACS chest x-ray unremarkable.  No infiltrates.  EKG, Pathology, and Other Studies: I have personally reviewed pertinent reports.    VTE Prophylaxis: Heparin    Code Status: Level 1 - Full Code  Advance Directive and Living Will:      Power of :    POLST:      None   holly

## 2024-11-13 NOTE — PATIENT PROFILE ADULT. - NS PRO ABUSE SCREEN SUSPICION NEGLECT YN

## 2025-07-01 NOTE — DISCHARGE NOTE ADULT - CLICK TO LAUNCH ORM
Chief Complaint   Patient presents with    Wrist Pain     Pt pt came in complaints of left wrist pain radiating to upper arm started 1 month ago worsened today , pt denies h/o trauma     Pt is awake and ambulatory  Waiting times explained    Vitals:    06/30/25 2331   BP: 138/88   Pulse: (!) 105   Resp: 16   Temp: 36.9 °C (98.5 °F)   SpO2: 96%       
.

## 2025-07-17 NOTE — PATIENT PROFILE ADULT. - PROVIDER NOTIFICATION PHONE
Copied from CRM #[id number]. [facility] - [address]  Call Back #: [phone]  Can a detailed message be left?  Yes - Voicemail   Is the patient OUT of Medication?  No : route as Routine priority according to KB. Patient has been advised the message will be reviewed within 2-3 business days.   [phone]